# Patient Record
Sex: FEMALE | Race: WHITE | NOT HISPANIC OR LATINO | URBAN - METROPOLITAN AREA
[De-identification: names, ages, dates, MRNs, and addresses within clinical notes are randomized per-mention and may not be internally consistent; named-entity substitution may affect disease eponyms.]

---

## 2023-08-12 ENCOUNTER — HOSPITAL ENCOUNTER (OUTPATIENT)
Dept: DATA CONVERSION | Facility: HOSPITAL | Age: 28
Discharge: PSYCHIATRIC OR SUBSTANCE USE FACILITY | End: 2023-08-13

## 2023-08-12 DIAGNOSIS — F14.10 COCAINE ABUSE, UNCOMPLICATED (MULTI): ICD-10-CM

## 2023-08-12 DIAGNOSIS — Z20.822 CONTACT WITH AND (SUSPECTED) EXPOSURE TO COVID-19: ICD-10-CM

## 2023-08-12 DIAGNOSIS — F10.129 ALCOHOL ABUSE WITH INTOXICATION, UNSPECIFIED (CMS-HCC): ICD-10-CM
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
## 2023-08-12 DIAGNOSIS — Z88.0 ALLERGY STATUS TO PENICILLIN: ICD-10-CM

## 2023-08-12 DIAGNOSIS — R00.0 TACHYCARDIA, UNSPECIFIED: ICD-10-CM

## 2023-08-12 LAB
ALBUMIN SERPL-MCNC: 4.5 GM/DL (ref 3.5–5)
ALBUMIN/GLOB SERPL: 1.7 RATIO (ref 1.5–3)
ALP BLD-CCNC: 74 U/L (ref 35–125)
ALT SERPL-CCNC: 51 U/L (ref 5–40)
AMPHETAMINES UR QL SCN>1000 NG/ML: NEGATIVE
ANION GAP SERPL CALCULATED.3IONS-SCNC: 18 MMOL/L (ref 0–19)
AST SERPL-CCNC: 54 U/L (ref 5–40)
BACTERIA UR QL AUTO: NEGATIVE
BARBITURATES UR QL SCN>300 NG/ML: NEGATIVE
BASOPHILS # BLD AUTO: 0.03 K/UL (ref 0–0.22)
BASOPHILS NFR BLD AUTO: 0.4 % (ref 0–1)
BENZODIAZ UR QL SCN>300 NG/ML: NEGATIVE
BILIRUB SERPL-MCNC: 0.8 MG/DL (ref 0.1–1.2)
BILIRUB UR QL STRIP.AUTO: NEGATIVE
BUN SERPL-MCNC: 5 MG/DL (ref 8–25)
BUN/CREAT SERPL: 8.3 RATIO (ref 8–21)
BZE UR QL SCN>300 NG/ML: NORMAL
CALCIUM SERPL-MCNC: 8.9 MG/DL (ref 8.5–10.4)
CANNABINOIDS UR QL SCN>50 NG/ML: NEGATIVE
CHLORIDE SERPL-SCNC: 99 MMOL/L (ref 97–107)
CLARITY UR: CLEAR
CO2 SERPL-SCNC: 19 MMOL/L (ref 24–31)
COLOR UR: NORMAL
CREAT SERPL-MCNC: 0.6 MG/DL (ref 0.4–1.6)
DEPRECATED RDW RBC AUTO: 38 FL (ref 37–54)
DIFFERENTIAL METHOD BLD: ABNORMAL
DRUG SCREEN COMMENT UR-IMP: NORMAL
EOSINOPHIL # BLD AUTO: 0.01 K/UL (ref 0–0.45)
EOSINOPHIL NFR BLD: 0.1 % (ref 0–3)
ERYTHROCYTE [DISTWIDTH] IN BLOOD BY AUTOMATED COUNT: 11.8 % (ref 11.7–15)
ETHANOL SERPL-MCNC: 0.21 GM/DL (ref 0–0.01)
EUA DISCLAIMER: NORMAL
FENTANYL+NORFENTANYL UR QL SCN: NORMAL
FLUAV RNA NPH QL NAA+PROBE: NEGATIVE
FLUBV RNA NPH QL NAA+PROBE: NEGATIVE
GFR SERPL CREATININE-BSD FRML MDRD: 125 ML/MIN/1.73 M2
GLOBULIN SER-MCNC: 2.7 G/DL (ref 1.9–3.7)
GLUCOSE SERPL-MCNC: 98 MG/DL (ref 65–99)
GLUCOSE UR STRIP.AUTO-MCNC: NEGATIVE MG/DL
HCT VFR BLD AUTO: 47.3 % (ref 36–44)
HGB BLD-MCNC: 17.3 GM/DL (ref 12–15)
HGB UR QL STRIP.AUTO: 1 /HPF (ref 0–3)
HGB UR QL: NEGATIVE
HS TROPONIN T DELTA: NORMAL (ref 0–4)
HYALINE CASTS UR QL AUTO: NORMAL /LPF
IMM GRANULOCYTES # BLD AUTO: 0.02 K/UL (ref 0–0.1)
KETONES UR QL STRIP.AUTO: NEGATIVE
LEUKOCYTE ESTERASE UR QL STRIP.AUTO: NEGATIVE
LYMPHOCYTES # BLD AUTO: 3 K/UL (ref 1.2–3.2)
LYMPHOCYTES NFR BLD MANUAL: 41.8 % (ref 20–40)
MCH RBC QN AUTO: 32.6 PG (ref 26–34)
MCHC RBC AUTO-ENTMCNC: 36.6 % (ref 31–37)
MCV RBC AUTO: 89.2 FL (ref 80–100)
METHADONE UR QL SCN>300 NG/ML: NEGATIVE
MICROSCOPIC (UA): NORMAL
MONOCYTES # BLD AUTO: 0.59 K/UL (ref 0–0.8)
MONOCYTES NFR BLD MANUAL: 8.2 % (ref 0–8)
NEUTROPHILS # BLD AUTO: 3.53 K/UL
NEUTROPHILS # BLD AUTO: 3.53 K/UL (ref 1.8–7.7)
NEUTROPHILS.IMMATURE NFR BLD: 0.3 % (ref 0–1)
NEUTS SEG NFR BLD: 49.2 % (ref 50–70)
NITRITE UR QL STRIP.AUTO: NEGATIVE
NRBC BLD-RTO: 0 /100 WBC
NT-PROBNP SERPL-MCNC: 86 PG/ML (ref 0–178)
OPIATES UR QL SCN>300 NG/ML: NEGATIVE
OXYCODONE UR QL: NEGATIVE
PCP UR QL SCN>25 NG/ML: NEGATIVE
PH UR STRIP.AUTO: 7 [PH] (ref 4.6–8)
PLATELET # BLD AUTO: 193 K/UL (ref 150–450)
PMV BLD AUTO: 9.3 CU (ref 7–12.6)
POTASSIUM SERPL-SCNC: 3.3 MMOL/L (ref 3.4–5.1)
PROT SERPL-MCNC: 7.2 G/DL (ref 5.9–7.9)
PROT UR STRIP.AUTO-MCNC: NEGATIVE MG/DL
RBC # BLD AUTO: 5.3 M/UL (ref 4–4.9)
SARS-COV-2 RNA SPEC QL NAA+PROBE: NEGATIVE
SODIUM SERPL-SCNC: 136 MMOL/L (ref 133–145)
SP GR UR STRIP.AUTO: 1 (ref 1–1.03)
SQUAMOUS UR QL AUTO: NORMAL /HPF
TROPONIN T SERPL-MCNC: 6 NG/L
URINE CULTURE: NORMAL
UROBILINOGEN UR QL STRIP.AUTO: NORMAL MG/DL (ref 0–1)
WBC # BLD AUTO: 7.2 K/UL (ref 4.5–11)
WBC #/AREA URNS AUTO: 1 /HPF (ref 0–3)

## 2023-10-12 DIAGNOSIS — Z30.41 SURVEILLANCE FOR BIRTH CONTROL, ORAL CONTRACEPTIVES: Primary | ICD-10-CM

## 2023-10-13 RX ORDER — NORETHINDRONE 0.35 MG/1
1 TABLET ORAL DAILY
Qty: 28 TABLET | Refills: 1 | Status: SHIPPED | OUTPATIENT
Start: 2023-10-13 | End: 2023-12-06

## 2024-02-07 ENCOUNTER — OFFICE VISIT (OUTPATIENT)
Dept: OBSTETRICS AND GYNECOLOGY | Facility: CLINIC | Age: 29
End: 2024-02-07
Payer: COMMERCIAL

## 2024-02-07 VITALS
WEIGHT: 193.8 LBS | DIASTOLIC BLOOD PRESSURE: 78 MMHG | HEIGHT: 67 IN | BODY MASS INDEX: 30.42 KG/M2 | SYSTOLIC BLOOD PRESSURE: 122 MMHG

## 2024-02-07 DIAGNOSIS — Z12.4 CERVICAL CANCER SCREENING: ICD-10-CM

## 2024-02-07 DIAGNOSIS — Z01.419 WELL WOMAN EXAM: Primary | ICD-10-CM

## 2024-02-07 DIAGNOSIS — Z11.51 ENCOUNTER FOR SCREENING FOR HUMAN PAPILLOMAVIRUS (HPV): ICD-10-CM

## 2024-02-07 DIAGNOSIS — Z98.890 HISTORY OF LOOP ELECTROSURGICAL EXCISION PROCEDURE (LEEP) OF CERVIX: ICD-10-CM

## 2024-02-07 DIAGNOSIS — Z31.9 PATIENT DESIRES PREGNANCY: ICD-10-CM

## 2024-02-07 PROCEDURE — 99395 PREV VISIT EST AGE 18-39: CPT | Performed by: OBSTETRICS & GYNECOLOGY

## 2024-02-07 PROCEDURE — 87661 TRICHOMONAS VAGINALIS AMPLIF: CPT | Performed by: OBSTETRICS & GYNECOLOGY

## 2024-02-07 PROCEDURE — 88175 CYTOPATH C/V AUTO FLUID REDO: CPT | Mod: TC,GCY,WESLAB | Performed by: OBSTETRICS & GYNECOLOGY

## 2024-02-07 PROCEDURE — 87624 HPV HI-RISK TYP POOLED RSLT: CPT | Performed by: OBSTETRICS & GYNECOLOGY

## 2024-02-07 PROCEDURE — 87800 DETECT AGNT MULT DNA DIREC: CPT | Mod: 59 | Performed by: OBSTETRICS & GYNECOLOGY

## 2024-02-07 ASSESSMENT — LIFESTYLE VARIABLES
HOW OFTEN DO YOU HAVE A DRINK CONTAINING ALCOHOL: NEVER
HOW MANY STANDARD DRINKS CONTAINING ALCOHOL DO YOU HAVE ON A TYPICAL DAY: PATIENT DOES NOT DRINK
AUDIT-C TOTAL SCORE: 0
HOW OFTEN DO YOU HAVE SIX OR MORE DRINKS ON ONE OCCASION: NEVER
SKIP TO QUESTIONS 9-10: 1

## 2024-02-07 ASSESSMENT — PAIN SCALES - GENERAL: PAINLEVEL: 0-NO PAIN

## 2024-02-07 ASSESSMENT — PATIENT HEALTH QUESTIONNAIRE - PHQ9
SUM OF ALL RESPONSES TO PHQ9 QUESTIONS 1 & 2: 0
1. LITTLE INTEREST OR PLEASURE IN DOING THINGS: NOT AT ALL
2. FEELING DOWN, DEPRESSED OR HOPELESS: NOT AT ALL

## 2024-02-07 ASSESSMENT — ENCOUNTER SYMPTOMS
OCCASIONAL FEELINGS OF UNSTEADINESS: 0
DEPRESSION: 0
LOSS OF SENSATION IN FEET: 0

## 2024-02-07 NOTE — PROGRESS NOTES
ESTABLISHED ANNUAL GYN VISIT     Patient Name:  Lizet Storm  :  1995  MR #:  [id number]  Acct #:  [id number]      ASSESSMENT/PLAN:   1. Well woman exam      2. History of loop electrosurgical excision procedure (LEEP) of cervix      3. Patient desires pregnancy      4. Cervical cancer screening    - THINPREP PAP TEST  - HPV DNA High Risk With Genotype  - C. Trachomatis / N. Gonorrhoeae, Amplified Detection  - Trichomonas vaginalis, Nucleic Acid Detection    5. Encounter for screening for human papillomavirus (HPV)    - THINPREP PAP TEST  - HPV DNA High Risk With Genotype  - C. Trachomatis / N. Gonorrhoeae, Amplified Detection  - Trichomonas vaginalis, Nucleic Acid Detection     .All questions answered.  Breast self exam technique reviewed and patient encouraged to perform self-exam monthly.  Discussed healthy lifestyle modifications.  Follow up in 1 year.  Thin prep Pap smear.      Advised to resume previously prescribed folic acid. RTO if no pregnancy after trying for 1 year.    Counseling:  Medication education:  Education:  All new and/or current medications discussed and reviewed including side effects with patient/caregiver, Understanding:  Caregiver/Patient expressed understanding., Adherence:  Barriers to adherence identified and discussed if present,     OB/GYN Preventive:  Pap smear indicated every 5 years if normal and otherwise low risk. Self breast exam monthly and clinical breast examination yearly discussed.  Diet/Weight management discussed.  Screening colonoscopy recommended starting age 45, then Q3-10 years depending on testing and family history.  Osteoporosis prevention discussion included vit d3/calcium supplements, weight-bearing exercise.  Genitourinary skin hygiene discussed.      Chief Complaint:  Annual exam    HPI:  Lizet Storm is a 28 y.o.  Patient's last menstrual period was 2024. C. female who presents for annual. Hx of LEEP. Last 2 paps normal.   New sex  "partner, desires STD screen.  Trying for pregnancy x 4 months      [facility]:   MENARCHE:  HPV Vaccine Yes 2 shots.  Sexual activity Yes - . [place] Yes -  Birth control history: OCP    History reviewed. No pertinent past medical history.    Past Surgical History:   Procedure Laterality Date    CERVICAL BIOPSY  W/ LOOP ELECTRODE EXCISION      COSMETIC SURGERY      BBL & lipo       Social History     Tobacco Use    Smoking status: Every Day     Packs/day: .5     Types: Cigarettes    Smokeless tobacco: Never   Vaping Use    Vaping Use: Every day    Substances: Nicotine   Substance Use Topics    Alcohol use: Not Currently    Drug use: Yes     Types: Marijuana        No family history on file.    OB History          0    Para   0    Term   0       0    AB   0    Living   0         SAB   0    IAB   0    Ectopic   0    Multiple   0    Live Births   0                  Prior to Admission medications    Medication Sig Start Date End Date Taking? Authorizing Provider   norethindrone (Micronor) 0.35 mg tablet TAKE 1 TABLET BY MOUTH DAILY 23   Sofia Belle PA-C       Allergies   Allergen Reactions    Penicillin V Unknown         ROS:   [facility] - WOMEN ONLY:          Breast Lump No acute changes.  Hot Flashes Occasional.  Painful Forsan no.  Vaginal Discharge no.       [facility] - ROS Update:          Unexplained Weight Change no.  Pain anywhere in your body no.  Black or bloody stools no.  Problems with urination no.  Rashes or sores no.  Sexual problems no.  Depression or anxiety problems no.  Do you feel threatened by anyone No.      OBJECTIVE:   /78   Ht 1.702 m (5' 7\")   Wt 87.9 kg (193 lb 12.8 oz)   LMP 2024   BMI 30.35 kg/m²   Body mass index is 30.35 kg/m².     Physical Exam  GENERAL:   General Appearance:  well-developed, well-nourished, no functional handicap, well-groomed.  Hygiene:  good.  Ill-appearance:  none.  Mental Status:  alert and oriented.  Speech:  clear.  Eye contact:  " normal.  Appears stated age:  yes.  Labs reviewed during visit  yes.  Diagnostic imaging reviewed with patient  yes.   LUNGS:  CTAB.  Effort:  no respiratory distress.    HEART:  HRRR with S1/S2 w/o M/C/R  BREASTS: General:  no masses, no tenderness, no skin changes, no nipple abnormality, and no axillary lymphadenopathy.   ABDOMEN: Tenderness:  none.  Distention:  none.   GENITOURINARY - FEMALE: Bladder:  normal.  Pelvic support defects:  not seen .  External genitalia:  Normal.  Urethra:  Normal.  Vagina:  no lesions, moderate discharge; STI screens collected - No.  Cervix/ cuff:  normal appearance .  Uterus:  normal size/shape/consistency, non tender.  Adnexa:  normal , non tender.   DERMATOLOGY:  Skin:  No acute changes  EXTREMITIES: Normal:  no anomalies.  Edema:  none.    NEUROLOGICAL: Orientation:  alert and oriented x 3.   PSYCHOLOGY: Affect:  appropriate.  Mood:  pleasant.    Labs reviewed:  Yes -     Imaging reviewed:  Yes -     Note: This dictation was generated using Dragon voice recognition software. Please excuse any grammatical or spelling errors that may have occurred using the system.

## 2024-02-08 LAB
C TRACH RRNA SPEC QL NAA+PROBE: NEGATIVE
N GONORRHOEA DNA SPEC QL PROBE+SIG AMP: NEGATIVE
T VAGINALIS RRNA SPEC QL NAA+PROBE: NEGATIVE

## 2024-02-23 LAB
CYTOLOGY CMNT CVX/VAG CYTO-IMP: NORMAL
HPV HR 12 DNA GENITAL QL NAA+PROBE: NEGATIVE
HPV HR GENOTYPES PNL CVX NAA+PROBE: NEGATIVE
HPV16 DNA SPEC QL NAA+PROBE: NEGATIVE
HPV18 DNA SPEC QL NAA+PROBE: NEGATIVE
LAB AP CONTRACEPTIVE HISTORY: NORMAL
LAB AP HPV GENOTYPE QUESTION: YES
LAB AP HPV HR: NORMAL
LAB AP PAP ADDITIONAL TESTS: NORMAL
LAB AP TREATMENT HISTORY: NORMAL
LABORATORY COMMENT REPORT: NORMAL
LMP START DATE: NORMAL
PATH REPORT.TOTAL CANCER: NORMAL

## 2024-02-27 ENCOUNTER — HOSPITAL ENCOUNTER (EMERGENCY)
Facility: HOSPITAL | Age: 29
Discharge: LEFT BEFORE DISCHARGE | End: 2024-02-27
Payer: COMMERCIAL

## 2024-02-27 VITALS
BODY MASS INDEX: 28.25 KG/M2 | DIASTOLIC BLOOD PRESSURE: 70 MMHG | RESPIRATION RATE: 16 BRPM | HEIGHT: 67 IN | WEIGHT: 180 LBS | SYSTOLIC BLOOD PRESSURE: 120 MMHG | OXYGEN SATURATION: 99 % | HEART RATE: 110 BPM | TEMPERATURE: 99.3 F

## 2024-02-27 PROCEDURE — 4500999001 HC ED NO CHARGE

## 2024-02-27 ASSESSMENT — COLUMBIA-SUICIDE SEVERITY RATING SCALE - C-SSRS
1. IN THE PAST MONTH, HAVE YOU WISHED YOU WERE DEAD OR WISHED YOU COULD GO TO SLEEP AND NOT WAKE UP?: NO
2. HAVE YOU ACTUALLY HAD ANY THOUGHTS OF KILLING YOURSELF?: NO
6. HAVE YOU EVER DONE ANYTHING, STARTED TO DO ANYTHING, OR PREPARED TO DO ANYTHING TO END YOUR LIFE?: NO

## 2024-02-27 ASSESSMENT — PAIN SCALES - GENERAL: PAINLEVEL_OUTOF10: 6

## 2024-02-27 ASSESSMENT — PAIN - FUNCTIONAL ASSESSMENT: PAIN_FUNCTIONAL_ASSESSMENT: 0-10

## 2024-03-06 ENCOUNTER — TELEPHONE (OUTPATIENT)
Dept: OBSTETRICS AND GYNECOLOGY | Facility: CLINIC | Age: 29
End: 2024-03-06
Payer: COMMERCIAL

## 2024-03-06 NOTE — TELEPHONE ENCOUNTER
Patient called stating that she was offered Rx for BV end of Feb and she declined because she didn't feel she had any Sx but now she is. She has irritation and white discharge, she is thinking she should take the Metrogel. I went ahead and called it in for her. Metrogel 0.75% insert one application at HS X5days with zero refills.

## 2024-06-26 ENCOUNTER — APPOINTMENT (OUTPATIENT)
Dept: RADIOLOGY | Facility: HOSPITAL | Age: 29
End: 2024-06-26
Payer: COMMERCIAL

## 2024-06-26 ENCOUNTER — APPOINTMENT (OUTPATIENT)
Dept: CARDIOLOGY | Facility: HOSPITAL | Age: 29
End: 2024-06-26
Payer: COMMERCIAL

## 2024-06-26 ENCOUNTER — HOSPITAL ENCOUNTER (EMERGENCY)
Facility: HOSPITAL | Age: 29
Discharge: HOME | End: 2024-06-26
Attending: STUDENT IN AN ORGANIZED HEALTH CARE EDUCATION/TRAINING PROGRAM
Payer: COMMERCIAL

## 2024-06-26 VITALS
BODY MASS INDEX: 30.53 KG/M2 | WEIGHT: 190 LBS | RESPIRATION RATE: 16 BRPM | HEIGHT: 66 IN | OXYGEN SATURATION: 99 % | HEART RATE: 94 BPM | SYSTOLIC BLOOD PRESSURE: 121 MMHG | DIASTOLIC BLOOD PRESSURE: 102 MMHG | TEMPERATURE: 97.7 F

## 2024-06-26 DIAGNOSIS — R55 NEAR SYNCOPE: Primary | ICD-10-CM

## 2024-06-26 DIAGNOSIS — E86.0 DEHYDRATION: ICD-10-CM

## 2024-06-26 LAB
ALBUMIN SERPL-MCNC: 4.4 G/DL (ref 3.5–5)
ALP BLD-CCNC: 73 U/L (ref 35–125)
ALT SERPL-CCNC: 18 U/L (ref 5–40)
AMPHETAMINES UR QL SCN>1000 NG/ML: NEGATIVE
ANION GAP SERPL CALC-SCNC: 14 MMOL/L
APPEARANCE UR: ABNORMAL
AST SERPL-CCNC: 17 U/L (ref 5–40)
BARBITURATES UR QL SCN>300 NG/ML: NEGATIVE
BASOPHILS # BLD AUTO: 0.14 X10*3/UL (ref 0–0.1)
BASOPHILS NFR BLD AUTO: 0.8 %
BENZODIAZ UR QL SCN>300 NG/ML: NEGATIVE
BILIRUB SERPL-MCNC: 0.4 MG/DL (ref 0.1–1.2)
BILIRUB UR STRIP.AUTO-MCNC: NEGATIVE MG/DL
BUN SERPL-MCNC: 9 MG/DL (ref 8–25)
BZE UR QL SCN>300 NG/ML: NEGATIVE
CALCIUM SERPL-MCNC: 9.5 MG/DL (ref 8.5–10.4)
CANNABINOIDS UR QL SCN>50 NG/ML: POSITIVE
CHLORIDE SERPL-SCNC: 104 MMOL/L (ref 97–107)
CO2 SERPL-SCNC: 20 MMOL/L (ref 24–31)
COLOR UR: YELLOW
CREAT SERPL-MCNC: 0.7 MG/DL (ref 0.4–1.6)
D DIMER PPP FEU-MCNC: 0.36 MG/L FEU (ref 0.19–0.5)
EGFRCR SERPLBLD CKD-EPI 2021: >90 ML/MIN/1.73M*2
EOSINOPHIL # BLD AUTO: 2.21 X10*3/UL (ref 0–0.7)
EOSINOPHIL NFR BLD AUTO: 12.6 %
ERYTHROCYTE [DISTWIDTH] IN BLOOD BY AUTOMATED COUNT: 12.3 % (ref 11.5–14.5)
FENTANYL+NORFENTANYL UR QL SCN: NEGATIVE
GLUCOSE BLD MANUAL STRIP-MCNC: 96 MG/DL (ref 74–99)
GLUCOSE SERPL-MCNC: 89 MG/DL (ref 65–99)
GLUCOSE UR STRIP.AUTO-MCNC: NORMAL MG/DL
HCG SERPL-ACNC: <1 MIU/ML
HCT VFR BLD AUTO: 43.4 % (ref 36–46)
HGB BLD-MCNC: 14.8 G/DL (ref 12–16)
IMM GRANULOCYTES # BLD AUTO: 0.06 X10*3/UL (ref 0–0.7)
IMM GRANULOCYTES NFR BLD AUTO: 0.3 % (ref 0–0.9)
KETONES UR STRIP.AUTO-MCNC: ABNORMAL MG/DL
LEUKOCYTE ESTERASE UR QL STRIP.AUTO: NEGATIVE
LYMPHOCYTES # BLD AUTO: 5.21 X10*3/UL (ref 1.2–4.8)
LYMPHOCYTES NFR BLD AUTO: 29.8 %
MAGNESIUM SERPL-MCNC: 2.3 MG/DL (ref 1.6–3.1)
MCH RBC QN AUTO: 30.1 PG (ref 26–34)
MCHC RBC AUTO-ENTMCNC: 34.1 G/DL (ref 32–36)
MCV RBC AUTO: 88 FL (ref 80–100)
METHADONE UR QL SCN>300 NG/ML: NEGATIVE
MONOCYTES # BLD AUTO: 1.44 X10*3/UL (ref 0.1–1)
MONOCYTES NFR BLD AUTO: 8.2 %
MUCOUS THREADS #/AREA URNS AUTO: NORMAL /LPF
NEUTROPHILS # BLD AUTO: 8.45 X10*3/UL (ref 1.2–7.7)
NEUTROPHILS NFR BLD AUTO: 48.3 %
NITRITE UR QL STRIP.AUTO: NEGATIVE
NRBC BLD-RTO: 0 /100 WBCS (ref 0–0)
OPIATES UR QL SCN>300 NG/ML: NEGATIVE
OXYCODONE UR QL: NEGATIVE
PCP UR QL SCN>25 NG/ML: NEGATIVE
PH UR STRIP.AUTO: 6 [PH]
PLATELET # BLD AUTO: 367 X10*3/UL (ref 150–450)
POTASSIUM SERPL-SCNC: 3.5 MMOL/L (ref 3.4–5.1)
PROT SERPL-MCNC: 7.3 G/DL (ref 5.9–7.9)
PROT UR STRIP.AUTO-MCNC: ABNORMAL MG/DL
RBC # BLD AUTO: 4.92 X10*6/UL (ref 4–5.2)
RBC # UR STRIP.AUTO: NEGATIVE /UL
RBC #/AREA URNS AUTO: NORMAL /HPF
SODIUM SERPL-SCNC: 138 MMOL/L (ref 133–145)
SP GR UR STRIP.AUTO: 1.02
SQUAMOUS #/AREA URNS AUTO: NORMAL /HPF
TROPONIN T SERPL-MCNC: <6 NG/L
TROPONIN T SERPL-MCNC: <6 NG/L
UROBILINOGEN UR STRIP.AUTO-MCNC: NORMAL MG/DL
WBC # BLD AUTO: 17.5 X10*3/UL (ref 4.4–11.3)
WBC #/AREA URNS AUTO: NORMAL /HPF

## 2024-06-26 PROCEDURE — 99283 EMERGENCY DEPT VISIT LOW MDM: CPT | Mod: 25

## 2024-06-26 PROCEDURE — 84702 CHORIONIC GONADOTROPIN TEST: CPT | Performed by: STUDENT IN AN ORGANIZED HEALTH CARE EDUCATION/TRAINING PROGRAM

## 2024-06-26 PROCEDURE — 84484 ASSAY OF TROPONIN QUANT: CPT | Performed by: STUDENT IN AN ORGANIZED HEALTH CARE EDUCATION/TRAINING PROGRAM

## 2024-06-26 PROCEDURE — 82947 ASSAY GLUCOSE BLOOD QUANT: CPT

## 2024-06-26 PROCEDURE — 71045 X-RAY EXAM CHEST 1 VIEW: CPT

## 2024-06-26 PROCEDURE — 80307 DRUG TEST PRSMV CHEM ANLYZR: CPT | Performed by: STUDENT IN AN ORGANIZED HEALTH CARE EDUCATION/TRAINING PROGRAM

## 2024-06-26 PROCEDURE — 81001 URINALYSIS AUTO W/SCOPE: CPT | Performed by: STUDENT IN AN ORGANIZED HEALTH CARE EDUCATION/TRAINING PROGRAM

## 2024-06-26 PROCEDURE — 71045 X-RAY EXAM CHEST 1 VIEW: CPT | Performed by: RADIOLOGY

## 2024-06-26 PROCEDURE — 85300 ANTITHROMBIN III ACTIVITY: CPT | Performed by: STUDENT IN AN ORGANIZED HEALTH CARE EDUCATION/TRAINING PROGRAM

## 2024-06-26 PROCEDURE — 2500000004 HC RX 250 GENERAL PHARMACY W/ HCPCS (ALT 636 FOR OP/ED): Performed by: STUDENT IN AN ORGANIZED HEALTH CARE EDUCATION/TRAINING PROGRAM

## 2024-06-26 PROCEDURE — 93005 ELECTROCARDIOGRAM TRACING: CPT

## 2024-06-26 PROCEDURE — 85025 COMPLETE CBC W/AUTO DIFF WBC: CPT | Performed by: STUDENT IN AN ORGANIZED HEALTH CARE EDUCATION/TRAINING PROGRAM

## 2024-06-26 PROCEDURE — 83735 ASSAY OF MAGNESIUM: CPT | Performed by: STUDENT IN AN ORGANIZED HEALTH CARE EDUCATION/TRAINING PROGRAM

## 2024-06-26 PROCEDURE — 80053 COMPREHEN METABOLIC PANEL: CPT | Performed by: STUDENT IN AN ORGANIZED HEALTH CARE EDUCATION/TRAINING PROGRAM

## 2024-06-26 PROCEDURE — 36415 COLL VENOUS BLD VENIPUNCTURE: CPT | Performed by: STUDENT IN AN ORGANIZED HEALTH CARE EDUCATION/TRAINING PROGRAM

## 2024-06-26 ASSESSMENT — COLUMBIA-SUICIDE SEVERITY RATING SCALE - C-SSRS
6. HAVE YOU EVER DONE ANYTHING, STARTED TO DO ANYTHING, OR PREPARED TO DO ANYTHING TO END YOUR LIFE?: NO
1. IN THE PAST MONTH, HAVE YOU WISHED YOU WERE DEAD OR WISHED YOU COULD GO TO SLEEP AND NOT WAKE UP?: NO
2. HAVE YOU ACTUALLY HAD ANY THOUGHTS OF KILLING YOURSELF?: NO

## 2024-06-26 ASSESSMENT — PAIN - FUNCTIONAL ASSESSMENT: PAIN_FUNCTIONAL_ASSESSMENT: 0-10

## 2024-06-26 ASSESSMENT — PAIN DESCRIPTION - PROGRESSION: CLINICAL_PROGRESSION: NOT CHANGED

## 2024-06-26 ASSESSMENT — PAIN SCALES - GENERAL: PAINLEVEL_OUTOF10: 0 - NO PAIN

## 2024-06-26 NOTE — ED TRIAGE NOTES
Patient presents diaphoretic and pale. She states prior to arrival she got dizzy and light headed.

## 2024-06-26 NOTE — ED PROVIDER NOTES
HPI   No chief complaint on file.      Patient is a 28-year-old female that presents emergency room for evaluation of dizziness, diaphoresis, lightheadedness and near syncope.  Patient states that she was out with her significant other getting a smoothie when she suddenly felt very lightheaded and flushed.  She went out to her vehicle and became very sweaty, lightheaded and thought she was going to pass out.  She admits to feeling short of breath as well as having a tight chest.  She denies abdominal pain, nausea, vomiting, fevers.  Patient states that the lightheadedness is slightly improving at this time however she still remains with chills as well as generalized weakness.      History provided by:  Patient                      No data recorded                   Patient History   No past medical history on file.  Past Surgical History:   Procedure Laterality Date    CERVICAL BIOPSY  W/ LOOP ELECTRODE EXCISION      COSMETIC SURGERY  2022    BBL & lipo     No family history on file.  Social History     Tobacco Use    Smoking status: Every Day     Current packs/day: 0.50     Types: Cigarettes    Smokeless tobacco: Never   Vaping Use    Vaping status: Every Day    Substances: Nicotine   Substance Use Topics    Alcohol use: Not Currently    Drug use: Yes     Types: Marijuana       Physical Exam   ED Triage Vitals   Temp Pulse Resp BP   -- -- -- --      SpO2 Temp src Heart Rate Source Patient Position   -- -- -- --      BP Location FiO2 (%)     -- --       Physical Exam  Vitals and nursing note reviewed.   Constitutional:       General: She is not in acute distress.     Appearance: She is ill-appearing and diaphoretic.   HENT:      Head: Normocephalic and atraumatic.      Mouth/Throat:      Mouth: Mucous membranes are moist.   Eyes:      Extraocular Movements: Extraocular movements intact.      Pupils: Pupils are equal, round, and reactive to light.   Cardiovascular:      Rate and Rhythm: Regular rhythm. Tachycardia  present.   Pulmonary:      Effort: Pulmonary effort is normal. No respiratory distress.      Breath sounds: No stridor. No wheezing or rhonchi.   Abdominal:      General: Abdomen is flat.      Tenderness: There is no abdominal tenderness. There is no guarding or rebound.   Musculoskeletal:         General: No swelling or tenderness.      Cervical back: Normal range of motion and neck supple. No rigidity or tenderness.   Skin:     General: Skin is warm.      Coloration: Skin is pale.   Neurological:      General: No focal deficit present.      Mental Status: She is alert and oriented to person, place, and time.       Recent Results (from the past 24 hour(s))   POCT GLUCOSE    Collection Time: 06/26/24  6:48 PM   Result Value Ref Range    POCT Glucose 96 74 - 99 mg/dL   CBC and Auto Differential    Collection Time: 06/26/24  7:03 PM   Result Value Ref Range    WBC 17.5 (H) 4.4 - 11.3 x10*3/uL    nRBC 0.0 0.0 - 0.0 /100 WBCs    RBC 4.92 4.00 - 5.20 x10*6/uL    Hemoglobin 14.8 12.0 - 16.0 g/dL    Hematocrit 43.4 36.0 - 46.0 %    MCV 88 80 - 100 fL    MCH 30.1 26.0 - 34.0 pg    MCHC 34.1 32.0 - 36.0 g/dL    RDW 12.3 11.5 - 14.5 %    Platelets 367 150 - 450 x10*3/uL    Neutrophils % 48.3 40.0 - 80.0 %    Immature Granulocytes %, Automated 0.3 0.0 - 0.9 %    Lymphocytes % 29.8 13.0 - 44.0 %    Monocytes % 8.2 2.0 - 10.0 %    Eosinophils % 12.6 0.0 - 6.0 %    Basophils % 0.8 0.0 - 2.0 %    Neutrophils Absolute 8.45 (H) 1.20 - 7.70 x10*3/uL    Immature Granulocytes Absolute, Automated 0.06 0.00 - 0.70 x10*3/uL    Lymphocytes Absolute 5.21 (H) 1.20 - 4.80 x10*3/uL    Monocytes Absolute 1.44 (H) 0.10 - 1.00 x10*3/uL    Eosinophils Absolute 2.21 (H) 0.00 - 0.70 x10*3/uL    Basophils Absolute 0.14 (H) 0.00 - 0.10 x10*3/uL   Comprehensive Metabolic Panel    Collection Time: 06/26/24  7:03 PM   Result Value Ref Range    Glucose 89 65 - 99 mg/dL    Sodium 138 133 - 145 mmol/L    Potassium 3.5 3.4 - 5.1 mmol/L    Chloride 104 97 -  107 mmol/L    Bicarbonate 20 (L) 24 - 31 mmol/L    Urea Nitrogen 9 8 - 25 mg/dL    Creatinine 0.70 0.40 - 1.60 mg/dL    eGFR >90 >60 mL/min/1.73m*2    Calcium 9.5 8.5 - 10.4 mg/dL    Albumin 4.4 3.5 - 5.0 g/dL    Alkaline Phosphatase 73 35 - 125 U/L    Total Protein 7.3 5.9 - 7.9 g/dL    AST 17 5 - 40 U/L    Bilirubin, Total 0.4 0.1 - 1.2 mg/dL    ALT 18 5 - 40 U/L    Anion Gap 14 <=19 mmol/L   D-dimer, quantitative    Collection Time: 06/26/24  7:03 PM   Result Value Ref Range    D-Dimer Non VTE, Quant (mg/L FEU) 0.36 0.19 - 0.50 mg/L FEU   Human Chorionic Gonadotropin, Serum Quantitative    Collection Time: 06/26/24  7:03 PM   Result Value Ref Range    HCG, Beta-Quantitative <1 SEE COMMENT BELOW mIU/mL   Serial Troponin, Initial ([facility])    Collection Time: 06/26/24  7:03 PM   Result Value Ref Range    Troponin T, High Sensitivity <6 <=14 ng/L   Magnesium    Collection Time: 06/26/24  7:03 PM   Result Value Ref Range    Magnesium 2.30 1.60 - 3.10 mg/dL   Drug Screen, Urine    Collection Time: 06/26/24  8:36 PM   Result Value Ref Range    Amphetamine Screen, Urine Negative      Barbiturate Screen, Urine Negative      Benzodiazepines Screen, Urine Negative      Cannabinoid Screen, Urine Positive (A)      Cocaine Metabolite Screen, Urine Negative      Fentanyl Screen, Urine Negative       Methadone Screen, Urine Negative      Opiate Screen, Urine Negative      Oxycodone Screen, Urine Negative      PCP Screen, Urine Negative     Urinalysis with Reflex Culture and Microscopic    Collection Time: 06/26/24  8:36 PM   Result Value Ref Range    Color, Urine Yellow Light-Yellow, Yellow, Dark-Yellow    Appearance, Urine Turbid (N) Clear    Specific Gravity, Urine 1.020 1.005 - 1.035    pH, Urine 6.0 5.0, 5.5, 6.0, 6.5, 7.0, 7.5, 8.0    Protein, Urine 10 (TRACE) NEGATIVE, 10 (TRACE), 20 (TRACE) mg/dL    Glucose, Urine Normal Normal mg/dL    Blood, Urine NEGATIVE NEGATIVE    Ketones, Urine TRACE (A) NEGATIVE mg/dL    Bilirubin,  Urine NEGATIVE NEGATIVE    Urobilinogen, Urine Normal Normal mg/dL    Nitrite, Urine NEGATIVE NEGATIVE    Leukocyte Esterase, Urine NEGATIVE NEGATIVE   Urinalysis Microscopic    Collection Time: 06/26/24  8:36 PM   Result Value Ref Range    WBC, Urine 1-5 1-5, NONE /HPF    RBC, Urine 1-2 NONE, 1-2, 3-5 /HPF    Squamous Epithelial Cells, Urine 10-25 (FEW) Reference range not established. /HPF    Mucus, Urine 4+ Reference range not established. /LPF   Serial Troponin, 2 Hour ([facility])    Collection Time: 06/26/24  9:22 PM   Result Value Ref Range    Troponin T, High Sensitivity <6 <=14 ng/L         ED Course & MDM   Diagnoses as of 06/26/24 2159   Near syncope   Dehydration       Medical Decision Making  Patient is a 28-year-old female who presents emergency room for evaluation after a near syncopal episode.  Patient ill-appearing, pale and diaphoretic on initial presentation.  EKG shows normal sinus rhythm with rate of 95 bpm, normal axis, QTc 467, no evidence of STEMI.  There is no evidence of Brugada syndrome, hypertrophic cardiomyopathy or prolonged QT syndrome.  Blood work ordered including CBC, CMP, troponin, serum pregnancy test, D-dimer.  Chest x-ray also ordered which showed no evidence pneumonia, pneumothorax, wide mediastinum.  Blood work was unremarkable including negative D-dimer of 0.36 and a very low suspicion for pulmonary embolism.  Troponin was less than 6 on initial testing and remained flat on repeat testing at less than 6.  She does have normal electrolytes, normal kidney function.  She did test positive for cannabinoids but urine drug screen was otherwise negative.  Patient not pregnant at this time.  She was initially borderline hypotensive with a blood pressure of 96/49 however was given 2 L of IV fluid with significant improvement of symptoms and states she is feeling much better.  She is able to ambulate without difficulty throughout stay in the emergency department.  Blood pressure did  significantly improve with IV fluids and it is felt this is likely vasovagal versus possible dehydration.  Patient does note that when she was younger she would have similar symptoms in never had a exact diagnosis for the previous near syncopal episodes.  Given these previous episodes with patient's current near syncopal episode she will be given cardiologist to follow-up with.  Patient also given primary care physician to follow-up with at this time.  Return precautions were discussed with patient and she is agreeable to plan.        Procedure  Procedures     Burt Wilkerson,   06/27/24 0022

## 2024-06-27 LAB
ATRIAL RATE: 95 BPM
HOLD SPECIMEN: NORMAL
P AXIS: 36 DEGREES
P OFFSET: 202 MS
P ONSET: 154 MS
PR INTERVAL: 128 MS
Q ONSET: 218 MS
QRS COUNT: 15 BEATS
QRS DURATION: 94 MS
QT INTERVAL: 372 MS
QTC CALCULATION(BAZETT): 467 MS
QTC FREDERICIA: 433 MS
R AXIS: 71 DEGREES
T AXIS: 59 DEGREES
T OFFSET: 404 MS
VENTRICULAR RATE: 95 BPM

## 2024-06-27 NOTE — DISCHARGE INSTRUCTIONS
Follow-up with cardiologist as well as a primary care physician for further evaluation moving forward.  If symptoms worsen or change he can return at any time for further evaluation and treatment.

## 2024-06-28 ENCOUNTER — APPOINTMENT (OUTPATIENT)
Dept: PRIMARY CARE | Facility: CLINIC | Age: 29
End: 2024-06-28
Payer: COMMERCIAL

## 2024-07-03 ENCOUNTER — APPOINTMENT (OUTPATIENT)
Dept: CARDIOLOGY | Facility: CLINIC | Age: 29
End: 2024-07-03
Payer: COMMERCIAL

## 2024-07-15 ENCOUNTER — OFFICE VISIT (OUTPATIENT)
Dept: PRIMARY CARE | Facility: CLINIC | Age: 29
End: 2024-07-15
Payer: COMMERCIAL

## 2024-07-15 VITALS
WEIGHT: 182 LBS | HEART RATE: 98 BPM | BODY MASS INDEX: 29.25 KG/M2 | TEMPERATURE: 96 F | SYSTOLIC BLOOD PRESSURE: 110 MMHG | OXYGEN SATURATION: 95 % | DIASTOLIC BLOOD PRESSURE: 80 MMHG | HEIGHT: 66 IN

## 2024-07-15 DIAGNOSIS — Z00.00 WELL ADULT EXAM: Primary | ICD-10-CM

## 2024-07-15 DIAGNOSIS — E86.0 DEHYDRATION: ICD-10-CM

## 2024-07-15 DIAGNOSIS — R06.2 WHEEZING: ICD-10-CM

## 2024-07-15 DIAGNOSIS — K21.00 GASTROESOPHAGEAL REFLUX DISEASE WITH ESOPHAGITIS, UNSPECIFIED WHETHER HEMORRHAGE: ICD-10-CM

## 2024-07-15 PROBLEM — F11.91 OPIOID USE DISORDER IN REMISSION: Status: ACTIVE | Noted: 2021-06-08

## 2024-07-15 PROBLEM — R44.0 AUDITORY HALLUCINATIONS: Status: RESOLVED | Noted: 2024-07-15 | Resolved: 2024-07-15

## 2024-07-15 PROBLEM — F14.20: Chronic | Status: RESOLVED | Noted: 2023-08-02 | Resolved: 2024-07-15

## 2024-07-15 PROBLEM — F33.1 MODERATE EPISODE OF RECURRENT MAJOR DEPRESSIVE DISORDER (MULTI): Chronic | Status: ACTIVE | Noted: 2023-08-02

## 2024-07-15 PROBLEM — R44.0 AUDITORY HALLUCINATIONS: Status: ACTIVE | Noted: 2024-07-15

## 2024-07-15 PROBLEM — F14.20: Chronic | Status: ACTIVE | Noted: 2023-08-02

## 2024-07-15 PROBLEM — F10.10 ALCOHOL ABUSE: Status: RESOLVED | Noted: 2022-09-24 | Resolved: 2024-07-15

## 2024-07-15 PROBLEM — F19.10 POLYSUBSTANCE ABUSE (MULTI): Status: ACTIVE | Noted: 2024-07-15

## 2024-07-15 PROBLEM — F19.10 POLYSUBSTANCE ABUSE (MULTI): Status: RESOLVED | Noted: 2024-07-15 | Resolved: 2024-07-15

## 2024-07-15 PROBLEM — F10.10 ALCOHOL ABUSE: Status: ACTIVE | Noted: 2022-09-24

## 2024-07-15 PROCEDURE — 99385 PREV VISIT NEW AGE 18-39: CPT | Performed by: NURSE PRACTITIONER

## 2024-07-15 RX ORDER — OMEPRAZOLE 40 MG/1
40 CAPSULE, DELAYED RELEASE ORAL
Qty: 90 CAPSULE | Refills: 1 | Status: SHIPPED | OUTPATIENT
Start: 2024-07-15 | End: 2025-01-11

## 2024-07-15 RX ORDER — ALBUTEROL SULFATE 90 UG/1
2 AEROSOL, METERED RESPIRATORY (INHALATION) EVERY 4 HOURS PRN
Qty: 8 G | Refills: 5 | Status: SHIPPED | OUTPATIENT
Start: 2024-07-15 | End: 2025-07-15

## 2024-07-15 RX ORDER — DEXTROAMPHETAMINE SACCHARATE, AMPHETAMINE ASPARTATE MONOHYDRATE, DEXTROAMPHETAMINE SULFATE AND AMPHETAMINE SULFATE 3.75; 3.75; 3.75; 3.75 MG/1; MG/1; MG/1; MG/1
15 CAPSULE, EXTENDED RELEASE ORAL EVERY MORNING
COMMUNITY
Start: 2024-07-11

## 2024-07-15 ASSESSMENT — PATIENT HEALTH QUESTIONNAIRE - PHQ9
5. POOR APPETITE OR OVEREATING: NEARLY EVERY DAY
3. TROUBLE FALLING OR STAYING ASLEEP OR SLEEPING TOO MUCH: NEARLY EVERY DAY
1. LITTLE INTEREST OR PLEASURE IN DOING THINGS: NEARLY EVERY DAY
7. TROUBLE CONCENTRATING ON THINGS, SUCH AS READING THE NEWSPAPER OR WATCHING TELEVISION: NEARLY EVERY DAY
4. FEELING TIRED OR HAVING LITTLE ENERGY: NEARLY EVERY DAY
SUM OF ALL RESPONSES TO PHQ QUESTIONS 1-9: 22
2. FEELING DOWN, DEPRESSED OR HOPELESS: NEARLY EVERY DAY
9. THOUGHTS THAT YOU WOULD BE BETTER OFF DEAD, OR OF HURTING YOURSELF: NOT AT ALL
6. FEELING BAD ABOUT YOURSELF - OR THAT YOU ARE A FAILURE OR HAVE LET YOURSELF OR YOUR FAMILY DOWN: NEARLY EVERY DAY
10. IF YOU CHECKED OFF ANY PROBLEMS, HOW DIFFICULT HAVE THESE PROBLEMS MADE IT FOR YOU TO DO YOUR WORK, TAKE CARE OF THINGS AT HOME, OR GET ALONG WITH OTHER PEOPLE: VERY DIFFICULT
SUM OF ALL RESPONSES TO PHQ9 QUESTIONS 1 AND 2: 6
8. MOVING OR SPEAKING SO SLOWLY THAT OTHER PEOPLE COULD HAVE NOTICED. OR THE OPPOSITE, BEING SO FIGETY OR RESTLESS THAT YOU HAVE BEEN MOVING AROUND A LOT MORE THAN USUAL: SEVERAL DAYS

## 2024-07-15 ASSESSMENT — ENCOUNTER SYMPTOMS
GASTROINTESTINAL NEGATIVE: 1
ENDOCRINE NEGATIVE: 1
NEUROLOGICAL NEGATIVE: 1
HEMATOLOGIC/LYMPHATIC NEGATIVE: 1
CONSTITUTIONAL NEGATIVE: 1
ALLERGIC/IMMUNOLOGIC NEGATIVE: 1
RESPIRATORY NEGATIVE: 1
CARDIOVASCULAR NEGATIVE: 1
PSYCHIATRIC NEGATIVE: 1
EYES NEGATIVE: 1
MUSCULOSKELETAL NEGATIVE: 1

## 2024-07-15 ASSESSMENT — LIFESTYLE VARIABLES
HOW MANY STANDARD DRINKS CONTAINING ALCOHOL DO YOU HAVE ON A TYPICAL DAY: PATIENT DOES NOT DRINK
AUDIT-C TOTAL SCORE: 0
SKIP TO QUESTIONS 9-10: 1
HOW OFTEN DO YOU HAVE A DRINK CONTAINING ALCOHOL: NEVER
HOW OFTEN DO YOU HAVE SIX OR MORE DRINKS ON ONE OCCASION: NEVER

## 2024-07-15 ASSESSMENT — PAIN SCALES - GENERAL: PAINLEVEL: 0-NO PAIN

## 2024-07-15 NOTE — PROGRESS NOTES
"Chief Complaint  Lizet Storm is a 28 y.o. female presenting for \"Establish Care (New patient establish care).\"    HPI     Lizet tSorm is a 28 y.o. female presenting NEW TO THE OFFICE, HERE FOR A PHYSICAL HAS ISSUES WITH HEARTBURN, TAKES OMEPRAZOLE.  PT HAS BEEN SOBER FOR A YEAR.        Past Medical History  Patient Active Problem List    Diagnosis Date Noted   • Moderate episode of recurrent major depressive disorder (Multi) 08/02/2023   • Opioid use disorder in remission 06/08/2021   • ADHD (attention deficit hyperactivity disorder), combined type 05/19/2014   • Anxiety disorder due to medical condition 05/19/2014        Medications  Current Outpatient Medications   Medication Instructions   • albuterol (Ventolin HFA) 90 mcg/actuation inhaler 2 puffs, inhalation, Every 4 hours PRN   • amphetamine-dextroamphetamine XR (Adderall XR) 15 mg 24 hr capsule 15 mg, oral, Every morning   • omeprazole (PRILOSEC) 40 mg, oral, Daily before breakfast, Do not crush or chew.        Surgical History  She has a past surgical history that includes Cosmetic surgery (2022); Cervical biopsy w/ loop electrode excision; and Tonsillectomy.     Social History  She reports that she has been smoking cigarettes. She has never been exposed to tobacco smoke. She has never used smokeless tobacco. She reports that she does not currently use alcohol. She reports that she does not use drugs.    Family History  No family history on file.     Allergies  Penicillin v    ROS  Review of Systems   Constitutional: Negative.    HENT: Negative.     Eyes: Negative.    Respiratory: Negative.     Cardiovascular: Negative.    Gastrointestinal: Negative.    Endocrine: Negative.    Genitourinary: Negative.    Musculoskeletal: Negative.    Skin: Negative.    Allergic/Immunologic: Negative.    Neurological: Negative.    Hematological: Negative.    Psychiatric/Behavioral: Negative.          Last Recorded Vitals  /80   Pulse 98   Temp 35.6 °C (96 " °F)   Wt 82.6 kg (182 lb)   SpO2 95%     Physical Exam  Vitals and nursing note reviewed.   Constitutional:       Appearance: Normal appearance.   HENT:      Head: Normocephalic and atraumatic.      Right Ear: Tympanic membrane, ear canal and external ear normal.      Left Ear: Tympanic membrane, ear canal and external ear normal.      Nose: Nose normal.      Mouth/Throat:      Mouth: Mucous membranes are moist.      Pharynx: Oropharynx is clear.   Eyes:      Extraocular Movements: Extraocular movements intact.      Conjunctiva/sclera: Conjunctivae normal.      Pupils: Pupils are equal, round, and reactive to light.   Neck:      Thyroid: No thyromegaly.   Cardiovascular:      Rate and Rhythm: Normal rate and regular rhythm.      Pulses: Normal pulses.      Heart sounds: Normal heart sounds, S1 normal and S2 normal.   Pulmonary:      Effort: Pulmonary effort is normal.      Breath sounds: Normal breath sounds. No wheezing or rhonchi.   Abdominal:      General: Bowel sounds are normal.      Palpations: Abdomen is soft. There is no mass.      Tenderness: There is no abdominal tenderness. There is no guarding.   Genitourinary:     Comments: Not examined  Musculoskeletal:         General: Normal range of motion.      Cervical back: Normal range of motion.      Right lower leg: No edema.      Left lower leg: No edema.   Lymphadenopathy:      Cervical: No cervical adenopathy.   Skin:     General: Skin is warm and dry.      Capillary Refill: Capillary refill takes less than 2 seconds.      Findings: No rash.   Neurological:      General: No focal deficit present.      Mental Status: She is alert and oriented to person, place, and time. Mental status is at baseline.      Cranial Nerves: Cranial nerves 2-12 are intact. No cranial nerve deficit.      Sensory: Sensation is intact.      Motor: Motor function is intact.      Coordination: Coordination is intact.      Gait: Gait is intact.   Psychiatric:         Mood and Affect:  Mood normal.         Behavior: Behavior normal.         Thought Content: Thought content normal.         Judgment: Judgment normal.       Relevant Results      Assessment/Plan   Lizet was seen today for establish care.  Diagnoses and all orders for this visit:  Well adult exam (Primary)  Dehydration  -     Referral to Primary Care  Wheezing  -     albuterol (Ventolin HFA) 90 mcg/actuation inhaler; Inhale 2 puffs every 4 hours if needed for wheezing or shortness of breath.  Gastroesophageal reflux disease with esophagitis, unspecified whether hemorrhage  -     omeprazole (PriLOSEC) 40 mg DR capsule; Take 1 capsule (40 mg) by mouth once daily in the morning. Take before meals. Do not crush or chew.          COUNSELING      Medication education:              Education:  The patient is counseled regarding potential side-effects of any and all new medications             Understanding:  Patient expressed understanding             Adherence:  No barriers to adherence identified        Rosetta Rosales, APRN-CNP

## 2024-07-16 ENCOUNTER — APPOINTMENT (OUTPATIENT)
Dept: CARDIOLOGY | Facility: CLINIC | Age: 29
End: 2024-07-16
Payer: COMMERCIAL

## 2024-07-16 VITALS
HEART RATE: 79 BPM | SYSTOLIC BLOOD PRESSURE: 129 MMHG | DIASTOLIC BLOOD PRESSURE: 88 MMHG | WEIGHT: 180 LBS | HEIGHT: 67 IN | BODY MASS INDEX: 28.25 KG/M2

## 2024-07-16 DIAGNOSIS — R55 NEAR SYNCOPE: ICD-10-CM

## 2024-07-16 DIAGNOSIS — Z72.0 TOBACCO ABUSE: Primary | ICD-10-CM

## 2024-07-16 PROCEDURE — 99406 BEHAV CHNG SMOKING 3-10 MIN: CPT

## 2024-07-16 PROCEDURE — 99204 OFFICE O/P NEW MOD 45 MIN: CPT

## 2024-07-16 NOTE — PATIENT INSTRUCTIONS
-Probably related to dehydration. No red flags at history of physical exam, EKG o ER labs  Recs:  -Hydration: Ensure an adequate hydration, especially during periods of prolonged sun exposure.  -Avoid Triggers: Avoid situations that may trigger vasovagal episodes, such as prolonged standing or dehydration.  -No other testing is advised at this moment. In case persistent symptoms despite avoiding triggers, please reconsult    #Tobacco abuse  -Stop smoking  -Cigarette smoking remains a leading contributor to cardiovascular morbidity and mortality worldwide. In [place] adults, smoking accounts for approximately 20 percent of cardiovascular deaths and almost one-third of deaths from coronary heart disease (CHD). Smoking is associated with an increased risk of death from heart disease across all age groups. The cardiovascular benefits of stopping smoking accrue almost immediately after quitting and increase over time. After one year of not smoking, the excess risk for CHD falls to 50 percent of that of current smokers. Even quitting after age 70 can reduce all-cause death  -Is important to know that quitting smoking is one of the most important things you can do to protect your health now and in the future.   -Free telephone quitline> 2-323-QUIT-NOW or 1-372.618.2563 can be used

## 2024-07-16 NOTE — PROGRESS NOTES
Cardiology     Recent evaluation in [facility]  28-year-old female who presented to the emergency room with dizziness, diaphoresis, lightheadedness, near syncope, and associated symptoms including shortness of breath and chest tightness. Initial assessment revealed the patient to be ill-appearing, pale, and diaphoretic. EKG showed normal sinus rhythm with no evidence of STEMI, Brugada syndrome, hypertrophic cardiomyopathy, or prolonged QT syndrome. Blood tests, including CBC, CMP, troponin, and D-dimer, were unremarkable, with troponin levels remaining flat and normal electrolytes and kidney function. The chest x-ray was clear. The patient was borderline hypotensive on arrival, which improved significantly after receiving 2 liters of IV fluids, suggesting a vasovagal episode or possible dehydration. The patient has a history of similar episodes without a definitive diagnosis. She tested positive for cannabinoids but was otherwise negative on the urine drug screen and not pregnant. The patient was discharged in stable condition     ===========================    At this visit  A 28-year-old woman with a history of ADHD, GERD, Tobacco abuse, drug and alcohol problems during her adolescence. She has been alcohol-free for 8 years. She smokes between half a pack to one pack of cigarettes per day since she was 14 years old. 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 She has no family history of cardiovascular disease. She does not use drugs and does not engage in sports. She has had approximately five similar episodes in her life to the most recent one, which ended in the emergency department. These episodes involve prolonged sun exposure, inadequate hydration, and a sensation of lightheadedness and near syncope that improves with hydration. In her daily life, she has normal functional capacity, with no history of palpitations or syncope outside of the previously described episodes, no symptoms of heart failure, and  "no chest pain.    Blood pressure today: 129/88 mmHg      Past Medical History:  She has no past medical history on file.    Past Surgical History:  She has a past surgical history that includes Cosmetic surgery (2022); Cervical biopsy w/ loop electrode excision; and Tonsillectomy.    Social History:  She reports that she has been smoking cigarettes. She has never been exposed to tobacco smoke. She has never used smokeless tobacco. She reports that she does not currently use alcohol. She reports that she does not use drugs.    Family History:  No family history on file.  Allergies:  Penicillin v    Outpatient Medications:  Current Outpatient Medications   Medication Instructions    albuterol (Ventolin HFA) 90 mcg/actuation inhaler 2 puffs, inhalation, Every 4 hours PRN    amphetamine-dextroamphetamine XR (Adderall XR) 15 mg 24 hr capsule 15 mg, oral, Every morning    omeprazole (PRILOSEC) 40 mg, oral, Daily before breakfast, Do not crush or chew.     Last Recorded Vitals:  There were no vitals filed for this visit.    Physical Exam:      7/15/2024     4:02 PM 6/26/2024     8:33 PM 6/26/2024     8:08 PM 6/26/2024     7:51 PM 6/26/2024     7:38 PM 6/26/2024     6:56 PM   Vitals   Systolic 110 121 109 101 90       MD aware 96   Diastolic 80 102 75 79 53       MD aware 49   Heart Rate 98  94   96   Temp 35.6 °C (96 °F)     36.5 °C (97.7 °F)   Resp   16   16   Height (in) 1.676 m (5' 6\")     1.676 m (5' 6\")   Weight (lb) 182     190   BMI 29.38 kg/m2     30.67 kg/m2   BSA (m2) 1.96 m2     2 m2   Visit Report Report          Wt Readings from Last 5 Encounters:   07/15/24 82.6 kg (182 lb)   06/26/24 86.2 kg (190 lb)   02/27/24 81.6 kg (180 lb)   02/07/24 87.9 kg (193 lb 12.8 oz)   01/18/22 75.3 kg (166 lb)       Physical Exam  Vitals reviewed.   HENT:      Head: Normocephalic.      Mouth/Throat:      Pharynx: Oropharynx is clear.   Eyes:      Pupils: Pupils are equal, round, and reactive to light.   Cardiovascular:      " "Rate and Rhythm: Normal rate.      Pulses: Normal pulses.      Heart sounds: Normal heart sounds.   Pulmonary:      Effort: Pulmonary effort is normal.      Breath sounds: Wheezing present.   Abdominal:      General: Abdomen is flat. Bowel sounds are normal.      Palpations: Abdomen is soft.   Musculoskeletal:         General: Normal range of motion.   Skin:     General: Skin is warm and dry.   Neurological:      General: No focal deficit present.      Mental Status: She is alert.   Psychiatric:         Mood and Affect: Mood normal.            Last Labs Reviewed:  CBC -  Recent Labs     06/26/24 1903 08/12/23 2146 09/24/22  0147 07/09/21  0950   WBC 17.5* 7.2 9.1 6.9   HGB 14.8 17.3* 14.5 14.1   HCT 43.4 47.3* 42.7 41.2    193 307 143*   MCV 88 89.2 90.3 96.7     CMP -  Recent Labs     06/26/24 1903 08/12/23 2146 09/24/22  0147 07/09/21  0950    136 137 136   K 3.5 3.3* 3.6 3.8    99 103 105   CO2 20* 19* 19* 18*   ANIONGAP 14 18 16 13   BUN 9 5* 6* 9   CREATININE 0.70 0.6 0.7 0.6   EGFR >90 125 121 127   MG 2.30  --   --   --    CALCIUM 9.5 8.9 9.0 9.6     Recent Labs     06/26/24 1903 08/12/23 2146 09/24/22  0147   ALBUMIN 4.4 4.5 4.4   ALKPHOS 73 74 56   ALT 18 51* 16   AST 17 54* 19   BILITOT 0.4 0.8 0.5     LIPID PANEL -   No results for input(s): \"CHOL\", \"LDLF\", \"LDLCALC\", \"HDL\", \"TRIG\" in the last 16711 hours.  COAGULATION PANEL -  No results for input(s): \"PTT\", \"INR\", \"HAUF\", \"DDIMERVTE\", \"HAPTOGLOBIN\", \"FIBRINOGEN\" in the last 04902 hours.  HEME/ENDO -  Recent Labs     11/28/22  1253 02/23/22  1451   HGBA1C 5.5 5.3     CARDIOVASCULAR  No results for input(s): \"LDH\", \"CKMB\", \"TROPHS\", \"BNP\", \"CRP\" in the last 61485 hours.    No lab exists for component: \"CK\", \"CKMBP\", \"CRPUS\", \"USCRP\"  Last Cardiology/Imaging Tests Personally Reviewed (if images available) and Interpreted:  ECG:  Encounter Date: 06/26/24   ECG 12 lead   Result Value    Ventricular Rate 95    Atrial Rate 95    MO " Interval 128    QRS Duration 94    QT Interval 372    QTC Calculation(Bazett) 467    P Axis 36    R Axis 71    T Axis 59    QRS Count 15    Q Onset 218    P Onset 154    P Offset 202    T Offset 404    QTC Fredericia 433    Narrative    Normal sinus rhythm  Normal ECG  No previous ECGs available  Confirmed by Matheus Betancourt ([id number]) on 6/27/2024 11:52:21 AM   ECG 12 lead 06/26/2024    Echocardiogram:  No results found for this or any previous visit from the past 1825 days.  No results found for this or any previous visit from the past 1095 days.    Cath:  No results found for this or any previous visit from the past 1825 days.  No results found for this or any previous visit from the past 3650 days.  No results found for this or any previous visit from the past 1095 days.    Stress Test:  No results found for this or any previous visit from the past 1825 days.  No results found for this or any previous visit from the past 1095 days.    Cardiac CT/MRI:  No results found for this or any previous visit from the past 1825 days.  No results found for this or any previous visit from the past 1095 days.    Other CT:      CV RISK FACTORS:   # Hypertension: Last BP:  .  # Hyperlipidemia: Last Tchol No results found for requested labs within last 365 days. / LDL No results found for requested labs within last 365 days. / HDL No results found for requested labs within last 365 days. / TRIG No results found for requested labs within last 365 days. (No results in last year.).  # Type II Diabetes Mellitus: Last A1c No results found for requested labs within last 365 days. (No results in last year.).  # Obesity: Last BMI:  .  # CKD: Last BUN/Cr (GFR): 9/0.70 (>90), 6/26/2024:  7:03 PM.    ASCV RISK:  The ASCVD Risk score (Candie DK, et al., 2019) failed to calculate for the following reasons:    The 2019 ASCVD risk score is only valid for ages 40 to 79    Assessment/Plan   A 28-year-old woman with a history of drug and alcohol  problems during her adolescence. She has been alcohol-free for 8 years. She smokes between half a pack to one pack of cigarettes per day since she was 14 years old. 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 She has no family history of cardiovascular disease. She does not use drugs and does not engage in sports. She has had approximately five similar episodes in her life to the most recent one, which ended in the emergency department. These episodes involve prolonged sun exposure, inadequate hydration, and a sensation of lightheadedness and near syncope that improves with hydration. In her daily life, she has normal functional capacity, with no history of palpitations or syncope outside of the previously described episodes, no symptoms of heart failure, and no chest pain.    #Near syncope  -Probably related to dehydration. No red flags at history of physical exam, EKG o ER labs  Recs:  -Hydration: Ensure an adequate hydration, especially during periods of prolonged sun exposure.  -Avoid Triggers: Avoid situations that may trigger vasovagal episodes, such as prolonged standing or dehydration.  -No other testing is advised at this moment. In case persistent symptoms despite avoiding triggers, please reconsult    #Tobacco abuse  -Stop smoking  -Cigarette smoking remains a leading contributor to cardiovascular morbidity and mortality worldwide. In [place] adults, smoking accounts for approximately 20 percent of cardiovascular deaths and almost one-third of deaths from coronary heart disease (CHD). Smoking is associated with an increased risk of death from heart disease across all age groups. The cardiovascular benefits of stopping smoking accrue almost immediately after quitting and increase over time. After one year of not smoking, the excess risk for CHD falls to 50 percent of that of current smokers. Even quitting after age 70 can reduce all-cause death  -Is important to know that quitting smoking is one  of the most important things you can do to protect your health now and in the future.   -Free telephone quitline> 3-165-QUIT-NOW or 1-101.445.5505 can be used    - No Cardiology follow up required, patient will return as needed  - I spent 45 minutes assessing the case between pre-charting, face-to-face patient interaction, and documentation    Blaine Lyman MD

## 2024-07-25 ENCOUNTER — TELEPHONE (OUTPATIENT)
Dept: OBSTETRICS AND GYNECOLOGY | Facility: CLINIC | Age: 29
End: 2024-07-25
Payer: COMMERCIAL

## 2024-07-25 NOTE — TELEPHONE ENCOUNTER
Patient called and left a VM stating she had some questions for Dr. Brizuela (she did not disclose what it was pertaining too.) I tried calling her back before I left for the day

## 2024-07-26 RX ORDER — ARIPIPRAZOLE 5 MG/1
5 TABLET ORAL DAILY
COMMUNITY
Start: 2024-07-25

## 2024-07-26 RX ORDER — CITALOPRAM 40 MG/1
40 TABLET, FILM COATED ORAL DAILY
COMMUNITY
Start: 2024-07-25

## 2024-07-26 NOTE — TELEPHONE ENCOUNTER
Pt not taking any hormones or BC since Dec.  Pt went back on psych meds and wants to know  if Celexa and Abilify will interfere with fertility as she is trying to conceive.

## 2024-07-26 NOTE — TELEPHONE ENCOUNTER
There may be risk to developing fetus when exposed to Abilify in the first trimester. It is category C.   Celexa has not been linked to major malformations in the fetus. Risk cannot be ruled out. Use if benefit to mom outweighs to fetus. Be sure prescribers are aware of her desire for pregnancy.

## 2024-09-14 ENCOUNTER — HOSPITAL ENCOUNTER (EMERGENCY)
Facility: HOSPITAL | Age: 29
Discharge: HOME | End: 2024-09-15
Payer: COMMERCIAL

## 2024-09-14 VITALS
TEMPERATURE: 97.7 F | HEART RATE: 80 BPM | BODY MASS INDEX: 28.25 KG/M2 | WEIGHT: 180 LBS | HEIGHT: 67 IN | OXYGEN SATURATION: 100 % | RESPIRATION RATE: 18 BRPM | SYSTOLIC BLOOD PRESSURE: 128 MMHG | DIASTOLIC BLOOD PRESSURE: 90 MMHG

## 2024-09-14 DIAGNOSIS — K04.7 DENTAL ABSCESS: ICD-10-CM

## 2024-09-14 DIAGNOSIS — K02.9 PAIN DUE TO DENTAL CARIES: Primary | ICD-10-CM

## 2024-09-14 PROCEDURE — 99283 EMERGENCY DEPT VISIT LOW MDM: CPT

## 2024-09-14 RX ORDER — KETOROLAC TROMETHAMINE 30 MG/ML
15 INJECTION, SOLUTION INTRAMUSCULAR; INTRAVENOUS ONCE
Status: COMPLETED | OUTPATIENT
Start: 2024-09-14 | End: 2024-09-15

## 2024-09-14 RX ORDER — AMOXICILLIN AND CLAVULANATE POTASSIUM 875; 125 MG/1; MG/1
1 TABLET, FILM COATED ORAL ONCE
Status: COMPLETED | OUTPATIENT
Start: 2024-09-14 | End: 2024-09-15

## 2024-09-14 ASSESSMENT — COLUMBIA-SUICIDE SEVERITY RATING SCALE - C-SSRS
1. IN THE PAST MONTH, HAVE YOU WISHED YOU WERE DEAD OR WISHED YOU COULD GO TO SLEEP AND NOT WAKE UP?: NO
6. HAVE YOU EVER DONE ANYTHING, STARTED TO DO ANYTHING, OR PREPARED TO DO ANYTHING TO END YOUR LIFE?: NO
2. HAVE YOU ACTUALLY HAD ANY THOUGHTS OF KILLING YOURSELF?: NO

## 2024-09-14 ASSESSMENT — LIFESTYLE VARIABLES
EVER FELT BAD OR GUILTY ABOUT YOUR DRINKING: NO
TOTAL SCORE: 0
EVER HAD A DRINK FIRST THING IN THE MORNING TO STEADY YOUR NERVES TO GET RID OF A HANGOVER: NO
HAVE YOU EVER FELT YOU SHOULD CUT DOWN ON YOUR DRINKING: NO
HAVE PEOPLE ANNOYED YOU BY CRITICIZING YOUR DRINKING: NO

## 2024-09-14 ASSESSMENT — PAIN SCALES - GENERAL: PAINLEVEL_OUTOF10: 10 - WORST POSSIBLE PAIN

## 2024-09-14 ASSESSMENT — PAIN - FUNCTIONAL ASSESSMENT: PAIN_FUNCTIONAL_ASSESSMENT: 0-10

## 2024-09-14 ASSESSMENT — PAIN DESCRIPTION - PAIN TYPE: TYPE: ACUTE PAIN

## 2024-09-14 ASSESSMENT — PAIN DESCRIPTION - LOCATION: LOCATION: MOUTH

## 2024-09-15 ENCOUNTER — HOSPITAL ENCOUNTER (EMERGENCY)
Facility: HOSPITAL | Age: 29
Discharge: HOME | End: 2024-09-15
Attending: EMERGENCY MEDICINE
Payer: COMMERCIAL

## 2024-09-15 VITALS
SYSTOLIC BLOOD PRESSURE: 121 MMHG | HEART RATE: 89 BPM | OXYGEN SATURATION: 100 % | WEIGHT: 180 LBS | TEMPERATURE: 97.3 F | RESPIRATION RATE: 20 BRPM | BODY MASS INDEX: 28.25 KG/M2 | HEIGHT: 67 IN | DIASTOLIC BLOOD PRESSURE: 80 MMHG

## 2024-09-15 DIAGNOSIS — K04.7 DENTAL INFECTION: ICD-10-CM

## 2024-09-15 DIAGNOSIS — K08.89 PAIN, DENTAL: Primary | ICD-10-CM

## 2024-09-15 DIAGNOSIS — I10 DIASTOLIC HYPERTENSION: ICD-10-CM

## 2024-09-15 LAB — HCG UR QL IA.RAPID: NEGATIVE

## 2024-09-15 PROCEDURE — 81025 URINE PREGNANCY TEST: CPT | Performed by: PHYSICIAN ASSISTANT

## 2024-09-15 PROCEDURE — 2500000004 HC RX 250 GENERAL PHARMACY W/ HCPCS (ALT 636 FOR OP/ED): Performed by: PHYSICIAN ASSISTANT

## 2024-09-15 PROCEDURE — 2500000001 HC RX 250 WO HCPCS SELF ADMINISTERED DRUGS (ALT 637 FOR MEDICARE OP): Performed by: PHYSICIAN ASSISTANT

## 2024-09-15 PROCEDURE — 99283 EMERGENCY DEPT VISIT LOW MDM: CPT

## 2024-09-15 PROCEDURE — 96372 THER/PROPH/DIAG INJ SC/IM: CPT | Performed by: PHYSICIAN ASSISTANT

## 2024-09-15 RX ORDER — KETOROLAC TROMETHAMINE 30 MG/ML
30 INJECTION, SOLUTION INTRAMUSCULAR; INTRAVENOUS ONCE
Status: COMPLETED | OUTPATIENT
Start: 2024-09-15 | End: 2024-09-15

## 2024-09-15 RX ORDER — CHLORHEXIDINE GLUCONATE ORAL RINSE 1.2 MG/ML
15 SOLUTION DENTAL AS NEEDED
Qty: 120 ML | Refills: 0 | Status: SHIPPED | OUTPATIENT
Start: 2024-09-15 | End: 2024-09-29

## 2024-09-15 RX ORDER — ACETAMINOPHEN 325 MG/1
975 TABLET ORAL ONCE
Status: COMPLETED | OUTPATIENT
Start: 2024-09-15 | End: 2024-09-15

## 2024-09-15 RX ORDER — KETOROLAC TROMETHAMINE 10 MG/1
10 TABLET, FILM COATED ORAL EVERY 6 HOURS PRN
Qty: 20 TABLET | Refills: 0 | Status: SHIPPED | OUTPATIENT
Start: 2024-09-15

## 2024-09-15 RX ORDER — AMOXICILLIN AND CLAVULANATE POTASSIUM 875; 125 MG/1; MG/1
1 TABLET, FILM COATED ORAL EVERY 12 HOURS
Qty: 14 TABLET | Refills: 0 | Status: SHIPPED | OUTPATIENT
Start: 2024-09-15 | End: 2024-09-22

## 2024-09-15 RX ORDER — ACETAMINOPHEN 325 MG/1
650 TABLET ORAL EVERY 6 HOURS PRN
Qty: 30 TABLET | Refills: 0 | Status: SHIPPED | OUTPATIENT
Start: 2024-09-15 | End: 2024-09-25

## 2024-09-15 RX ADMIN — AMOXICILLIN AND CLAVULANATE POTASSIUM 1 TABLET: 875; 125 TABLET, FILM COATED ORAL at 00:33

## 2024-09-15 RX ADMIN — KETOROLAC TROMETHAMINE 15 MG: 30 INJECTION, SOLUTION INTRAMUSCULAR at 00:33

## 2024-09-15 ASSESSMENT — LIFESTYLE VARIABLES
EVER HAD A DRINK FIRST THING IN THE MORNING TO STEADY YOUR NERVES TO GET RID OF A HANGOVER: NO
HAVE PEOPLE ANNOYED YOU BY CRITICIZING YOUR DRINKING: NO
EVER FELT BAD OR GUILTY ABOUT YOUR DRINKING: NO
TOTAL SCORE: 0
HAVE YOU EVER FELT YOU SHOULD CUT DOWN ON YOUR DRINKING: NO

## 2024-09-15 ASSESSMENT — PAIN DESCRIPTION - LOCATION: LOCATION: TEETH

## 2024-09-15 ASSESSMENT — PAIN SCALES - GENERAL
PAINLEVEL_OUTOF10: 9
PAINLEVEL_OUTOF10: 9

## 2024-09-15 ASSESSMENT — PAIN DESCRIPTION - PROGRESSION: CLINICAL_PROGRESSION: NOT CHANGED

## 2024-09-15 ASSESSMENT — PAIN DESCRIPTION - PAIN TYPE: TYPE: ACUTE PAIN

## 2024-09-15 ASSESSMENT — PAIN DESCRIPTION - ORIENTATION: ORIENTATION: RIGHT

## 2024-09-15 ASSESSMENT — PAIN DESCRIPTION - FREQUENCY: FREQUENCY: CONSTANT/CONTINUOUS

## 2024-09-15 ASSESSMENT — PAIN - FUNCTIONAL ASSESSMENT: PAIN_FUNCTIONAL_ASSESSMENT: 0-10

## 2024-09-15 NOTE — ED PROVIDER NOTES
HPI   Chief Complaint   Patient presents with   • Dental Pain       HPI        Patient History   No past medical history on file.  Past Surgical History:   Procedure Laterality Date   • CERVICAL BIOPSY  W/ LOOP ELECTRODE EXCISION     • COSMETIC SURGERY  2022    BBL & lipo   • TONSILLECTOMY       No family history on file.  Social History     Tobacco Use   • Smoking status: Every Day     Current packs/day: 0.50     Types: Cigarettes     Passive exposure: Never   • Smokeless tobacco: Never   Vaping Use   • Vaping status: Every Day   • Substances: Nicotine, THC, CBD, Flavoring   • Devices: Disposable, Pre-filled or refillable cartridge   Substance Use Topics   • Alcohol use: Not Currently   • Drug use: Never       Physical Exam   ED Triage Vitals [09/14/24 2317]   Temperature Heart Rate Respirations BP   36.5 °C (97.7 °F) 80 18 128/90      Pulse Ox Temp Source Heart Rate Source Patient Position   100 % Temporal Monitor --      BP Location FiO2 (%)     -- --       Physical Exam      ED Course & MDM   Diagnoses as of 09/15/24 0037   Pain due to dental caries   Dental abscess                 No data recorded     [place] Coma Scale Score: 15 (09/14/24 2320 : Delia Kang RN)                           Medical Decision Making  Parts of this chart have been completed using voice recognition software. Please excuse any errors of transcription.  My thought process and reason for plan has been formulated from the time that I saw the patient until the time of disposition and is not specific to one specific moment during their visit and furthermore my MDM encompasses this entire chart and not only this text box.      HPI: Detailed above.    Exam: A medically appropriate exam performed, outlined above, given the known history and presentation.    History obtained from: ***    EKG: ***    Social Determinants of Health considered during this visit: ***    Medications given during visit:  Medications   amoxicillin-pot clavulanate  (Augmentin) 875-125 mg per tablet 1 tablet (1 tablet oral Given 9/15/24 0033)   ketorolac (Toradol) injection 15 mg (15 mg intramuscular Given 9/15/24 0033)        Diagnostic/tests  Labs Reviewed   HCG, URINE, QUALITATIVE      No orders to display        Considerations/further MDM:  ***    Co-sign ***      Procedure  Procedures

## 2024-09-15 NOTE — DISCHARGE INSTRUCTIONS
Please take Augmentin as prescribed.  You may use ketorolac as needed for pain.  Do not use any other NSAIDs with ketorolac such as ibuprofen, naproxen.  Please use Peridex mouthwash as prescribed.  Follow-up with your dentist as scheduled.  Return to the emergency department if you begin to have fevers, swelling of your face, swelling of your neck, pain with swallowing or difficulty swallowing    I also recommend to use warm compresses to your left side of your face frequently.

## 2024-09-15 NOTE — ED PROVIDER NOTES
HPI   Chief Complaint   Patient presents with    Dental Pain     Pt was here last night for dental pain and sts the pain meds aren't helping her with her pain. Pt sts 9/10 pain at this time.        28-year-old female presented emergency department the chief complaint of dental pain.  She was seen overnight at the emergency department.  He was prescribed antibiotic of which she has not taken any yet.  The pain has not improved so she presented back to the emergency department.  She is well-appearing nontoxic afebrile.  Denies injury or trauma.  No other complaint.  She is able to tolerate by mouth.  Her vital signs are normal.              Patient History   No past medical history on file.  Past Surgical History:   Procedure Laterality Date    CERVICAL BIOPSY  W/ LOOP ELECTRODE EXCISION      COSMETIC SURGERY  2022    BBL & lipo    TONSILLECTOMY       No family history on file.  Social History     Tobacco Use    Smoking status: Every Day     Current packs/day: 0.50     Types: Cigarettes     Passive exposure: Never    Smokeless tobacco: Never   Vaping Use    Vaping status: Every Day    Substances: Nicotine, THC, CBD, Flavoring    Devices: Disposable, Pre-filled or refillable cartridge   Substance Use Topics    Alcohol use: Not Currently    Drug use: Never       Physical Exam   ED Triage Vitals [09/15/24 1640]   Temperature Heart Rate Respirations BP   36.3 °C (97.3 °F) 89 20 121/80      Pulse Ox Temp Source Heart Rate Source Patient Position   100 % Temporal Monitor Sitting      BP Location FiO2 (%)     Left arm --       Physical Exam  Vitals and nursing note reviewed.   Constitutional:       Appearance: Normal appearance.   HENT:      Head: Normocephalic.      Comments: Dental caries without abscess     Nose: Nose normal.      Mouth/Throat:      Mouth: Mucous membranes are moist.   Cardiovascular:      Rate and Rhythm: Normal rate.      Pulses: Normal pulses.   Pulmonary:      Effort: Pulmonary effort is normal.    Abdominal:      General: Abdomen is flat.   Musculoskeletal:         General: Normal range of motion.      Cervical back: Normal range of motion.   Skin:     General: Skin is warm.   Neurological:      General: No focal deficit present.      Mental Status: She is alert and oriented to person, place, and time.   Psychiatric:         Mood and Affect: Mood normal.           ED Course & MDM   Diagnoses as of 09/15/24 1650   Pain, dental   Dental infection                 No data recorded     [place] Coma Scale Score: 15 (09/15/24 1641 : Minna Shankar RN)                           Medical Decision Making  I have seen and evaluated this patient.  The attending physician has also seen and evaluated this patient.  Vital signs, laboratory testing and diagnostic images if applicable have been reviewed.  All laboratory and imaging is interpreted by myself unless otherwise stated.  Radiology studies are also formally interpreted by radiologist.    Patient is currently on antibiotic.  Will add benzocaine.  Will give dental referral.  There is no drainable abscess.  There is patent airway without airway compromise or respiratory distress.  Suspect patient will have improvement of symptoms after several more days of antibiotic.  Narcotic not indicated.  Released in stable condition.        Labs Reviewed - No data to display  No orders to display     Medications   ketorolac (Toradol) injection 30 mg (has no administration in time range)   acetaminophen (Tylenol) tablet 975 mg (has no administration in time range)     New Prescriptions    ACETAMINOPHEN (TYLENOL) 325 MG TABLET    Take 2 tablets (650 mg) by mouth every 6 hours if needed for mild pain (1 - 3) for up to 10 days.    BENZOCAINE (ORAJEL) 10 % MUCOSAL GEL    Use in the mouth or throat 4 times a day as needed (dental pain).         Procedure  Procedures     Burt Lewis PA-C  09/15/24 1651       Burt Lewis PA-C  09/15/24 1651

## 2024-09-15 NOTE — ED PROVIDER NOTES
HPI   Chief Complaint   Patient presents with    Dental Pain     Pt was here last night for dental pain and sts the pain meds aren't helping her with her pain. Pt sts 9/10 pain at this time.        Is a 28-year-old female presenting to the emergency department with complaints of left lower molar pain.  Patient states over the last few months she has been having dental pain on the left lower molar.  She states that over the last 3 days she has been having increasing pain.  She states swishing and spitting with salt water and Listerine helps temporarily but the pain returns.  She denies purulent drainage.  No swelling of her neck or face.  No difficulty speaking, or swallowing.  No shortness of breath.  She denies any fevers or chills.  She does have an appointment with her dentist this coming up Friday for cavity filling but cannot wait due to pain.        Please see HPI for pertinent positive and negative ROS.       Patient History   No past medical history on file.  Past Surgical History:   Procedure Laterality Date    CERVICAL BIOPSY  W/ LOOP ELECTRODE EXCISION      COSMETIC SURGERY  2022    BBL & lipo    TONSILLECTOMY       No family history on file.  Social History     Tobacco Use    Smoking status: Every Day     Current packs/day: 0.50     Types: Cigarettes     Passive exposure: Never    Smokeless tobacco: Never   Vaping Use    Vaping status: Every Day    Substances: Nicotine, THC, CBD, Flavoring    Devices: Disposable, Pre-filled or refillable cartridge   Substance Use Topics    Alcohol use: Not Currently    Drug use: Never       Physical Exam   ED Triage Vitals [09/15/24 1640]   Temperature Heart Rate Respirations BP   36.3 °C (97.3 °F) 89 20 121/80      Pulse Ox Temp Source Heart Rate Source Patient Position   100 % Temporal Monitor Sitting      BP Location FiO2 (%)     Left arm --       Physical Exam  GENERAL APPEARANCE: This patient is in no acute respiratory distress. Awake and alert, talking  appropriately. Answering questions appropriately. No evidence of pressured speech  VITAL SIGNS: As per the nurses' triage record.  HEENT: Normocephalic, atraumatic.  Patient has tenderness palpation over the left lower posterior gumline.  Teeth are intact.  There is no fluctuance appreciated.  There is no purulent drainage with manipulation.  No swelling of her maxillary region or cervical region on the right side.  No mastoid bone erythema or edema bilaterally.  No tenderness to palpation over the floor the mouth, no fluctuance over the floor the mouth.  NECK:  full gross ROM during exam.  No tenderness or swelling over the submandibular, anterior and posterior cervical region bilaterally.  MUSCULOSKELETAL:  Full gross active range of motion. Ambulating on own with no acute difficulties  NEUROLOGICAL: Awake, alert and oriented x 3.  IMMUNOLOGICAL: No palpable lymphadenopathy or lymphatic streaking noted on visible skin.  DERM: No petechiae, rashes, or ecchymoses on visible skin  PSYCH: mood and affect appear normal.      ED Course & MDM   Diagnoses as of 09/15/24 1712   Pain, dental   Dental infection   Diastolic hypertension                 No data recorded     [place] Coma Scale Score: 15 (09/15/24 1641 : Minna Shankar RN)                           Medical Decision Making  Parts of this chart have been completed using voice recognition software. Please excuse any errors of transcription.  My thought process and reason for plan has been formulated from the time that I saw the patient until the time of disposition and is not specific to one specific moment during their visit and furthermore my MDM encompasses this entire chart and not only this text box.      HPI: Detailed above.    Exam: A medically appropriate exam performed, outlined above, given the known history and presentation.    History obtained from: Patient    Medications given during visit:  Medications   ketorolac (Toradol) injection 30 mg (30 mg  intramuscular Given 9/15/24 1700)   acetaminophen (Tylenol) tablet 975 mg (975 mg oral Given 9/15/24 1700)        Diagnostic/tests  Labs Reviewed - No data to display   No orders to display        Considerations/further MDM:    Patient presents with stable vital signs.    Negative pregnancy test.    Differential diagnosis includes was not limited to dental abscess versus pulpitis versus gingivitis versus [place] angina versus deep space abscess.  I do have low suspicion for deep space abscess or [place] angina due to unremarkable physical exam findings, no fever or chills.  Patient was treated with IM Toradol and given her first dose of Augmentin.  She was discharged with a prescription for Augmentin and Toradol.  She was told to follow-up with her dentist or primary care doctor.  She was given strict return precautions to the emergency department.  She felt comfortable discharge plan home.  She was started stable condition in the company of her significant other.      Procedure  Procedures     Lashawn Farmer PA-C  09/15/24 3318

## 2024-09-15 NOTE — DISCHARGE INSTRUCTIONS
Follow-up with your primary care physician within 1 to 2 days for further management of your current symptoms and repeat check of your blood pressure.    Follow-up with the dentist within 2 to 3 days for further management of your symptoms.  You may call an emergency dentist if you feel like you need to be seen sooner.      Return to the emergency department sooner with worsening of symptoms or onset of new symptoms

## 2024-09-15 NOTE — ED TRIAGE NOTES
Pt presents ambulatory through triage with c/o dental pain x1 week. Pt has a known cavity and has an appointment scheduled for Friday but states she cannot control the pain. Pt has been taking tylenol and ibuprofen around the clock. Pt states she last took an ibuprofen on her way into the ER.

## 2024-09-15 NOTE — Clinical Note
Lizet Storm was seen and treated in our emergency department on 9/15/2024.  She may return to work on 09/15/2024.       If you have any questions or concerns, please don't hesitate to call.      Dolores Quintanilla MD

## 2024-09-15 NOTE — PROGRESS NOTES
Attestation/Supervisory note for МАРИНА Lewis      The patient is a 28-year-old female presenting to the emergency department for evaluation of left lower rear molar dental pain.  She states that she has had the pain for the past several days.  She states that she came to the emergency room early this morning, around 0100, for the same symptoms.  She states that she does have an appointment scheduled with her dentist next week but she came back to the emergency room because of persistent symptoms.  No difficulty swallowing or speaking.  No headache or visual changes.  No focal weakness or numbness.  No fever or chills.  No recent injury or trauma.  No chest pain or shortness of breath.  No abdominal pain.  No nausea vomiting.  No diarrhea or constipation. no urinary complaints.  All pertinent positives and negatives are recorded above.  All other systems reviewed and otherwise negative.  Vital signs with mild diastolic hypertension but otherwise within normal limits.  Physical exam with a well-nourished well-developed female in no acute distress.  HEENT exam with mild widespread dental decay but no visible or palpable abscess.  No pooling of secretions.  No stridor.  No difficulty speaking.  No evidence of airway compromise or respiratory distress.  Abdominal exam is benign.  She does not have any gross motor, neurologic or vascular deficits on exam.      There is no indication for emergent imaging and/or diagnostic testing at this time.      Oral acetaminophen and IM Toradol ordered for symptom relief      The patient was released in good condition.  She was instructed to follow-up with her primary care physician within 1 to 2 days for further management of her current symptoms and repeat check of her blood pressure.  She was also given a referral to the on-call dentist.  She was instructed to return to the emergency department sooner with worsening of symptoms or onset of new symptoms.  She will continue to use the  medications previously prescribed from her previous visit to the emergency department.      Impression/diagnosis:  Dental pain, left lower rear molar  Diastolic hypertension      I personally saw the patient and made/approve the management plan and take responsibility for the patient management.      I personally discussed the patient's management with the patient        Dolores Quintanilla MD

## 2024-09-18 NOTE — ED PROVIDER NOTES
HPI   Chief Complaint   Patient presents with    Dental Pain       HPI  I saw this patient on 9/14/2024.  I completed my note on 9/15/2024 at 1712.  Please refer to note signed on 9/15/2024 at 1712 for documentation on this patient from encounter on 9/14/2024      Patient History   No past medical history on file.  Past Surgical History:   Procedure Laterality Date    CERVICAL BIOPSY  W/ LOOP ELECTRODE EXCISION      COSMETIC SURGERY  2022    BBL & lipo    TONSILLECTOMY       No family history on file.  Social History     Tobacco Use    Smoking status: Every Day     Current packs/day: 0.50     Types: Cigarettes     Passive exposure: Never    Smokeless tobacco: Never   Vaping Use    Vaping status: Every Day    Substances: Nicotine, THC, CBD, Flavoring    Devices: Disposable, Pre-filled or refillable cartridge   Substance Use Topics    Alcohol use: Not Currently    Drug use: Never       Physical Exam   ED Triage Vitals [09/14/24 2317]   Temperature Heart Rate Respirations BP   36.5 °C (97.7 °F) 80 18 128/90      Pulse Ox Temp Source Heart Rate Source Patient Position   100 % Temporal Monitor --      BP Location FiO2 (%)     -- --       Physical Exam      ED Course & MDM   Diagnoses as of 09/18/24 1227   Pain due to dental caries   Dental abscess                 No data recorded     [place] Coma Scale Score: 15 (09/14/24 2320 : Delia Kang RN)                           Medical Decision Making      Procedure  Procedures     Lashawn Farmer PA-C  09/18/24 1228       Lashawn Myers PA-C  10/08/24 2150

## 2024-11-11 ENCOUNTER — OFFICE VISIT (OUTPATIENT)
Dept: OBSTETRICS AND GYNECOLOGY | Facility: CLINIC | Age: 29
End: 2024-11-11
Payer: COMMERCIAL

## 2024-11-11 ENCOUNTER — HOSPITAL ENCOUNTER (OUTPATIENT)
Dept: RADIOLOGY | Facility: EXTERNAL LOCATION | Age: 29
Discharge: HOME | End: 2024-11-11

## 2024-11-11 VITALS — BODY MASS INDEX: 30.07 KG/M2 | DIASTOLIC BLOOD PRESSURE: 82 MMHG | WEIGHT: 192 LBS | SYSTOLIC BLOOD PRESSURE: 116 MMHG

## 2024-11-11 DIAGNOSIS — Z31.69 ENCOUNTER FOR PRECONCEPTION CONSULTATION: Primary | ICD-10-CM

## 2024-11-11 DIAGNOSIS — Z31.69 ENCOUNTER FOR PRECONCEPTION CONSULTATION: ICD-10-CM

## 2024-11-11 DIAGNOSIS — Z71.6 ENCOUNTER FOR TOBACCO USE CESSATION COUNSELING: ICD-10-CM

## 2024-11-11 PROCEDURE — 99213 OFFICE O/P EST LOW 20 MIN: CPT | Performed by: OBSTETRICS & GYNECOLOGY

## 2024-11-11 ASSESSMENT — ENCOUNTER SYMPTOMS
OCCASIONAL FEELINGS OF UNSTEADINESS: 0
DEPRESSION: 0
LOSS OF SENSATION IN FEET: 0

## 2024-11-11 ASSESSMENT — PATIENT HEALTH QUESTIONNAIRE - PHQ9
2. FEELING DOWN, DEPRESSED OR HOPELESS: NOT AT ALL
SUM OF ALL RESPONSES TO PHQ9 QUESTIONS 1 & 2: 0
1. LITTLE INTEREST OR PLEASURE IN DOING THINGS: NOT AT ALL

## 2024-11-11 NOTE — PROGRESS NOTES
GYN OFFICE VISIT    Patient Name:  Lizet Storm  :  1995  MR #:  [id number]  Acct #:  [id number]      ASSESSMENT/PLAN:   Diagnoses and all orders for this visit:  Encounter for preconception consultation  -     Referral to [facility]; Future  -     TSH; Future  -     Testosterone, total and free; Future  -     DHEA-Sulfate; Future  -     DHEA; Future  -     CBC and Auto Differential; Future  -     HCG, quantitative, pregnancy; Future  -     FSH & LH; Future  -     US pelvis hysterosonography doppler; Future  -     Estradiol; Future  -     Progesterone; Future  Encounter for tobacco use cessation counseling    -Discussed saline sonogram with [employer] see.  Referral placed for Dr. Whalen.  -Discussed functional medicine to improve her pelvic circulation and functionality of her pelvic floor.   -Sent support to the portal.  Informed of -8 [id number]-QuitNOW.  Declines cessation aids.  States that her and her partner are down to less than 5 cigarettes/day.  -Medication education:   All new and/or current medications discussed and reviewed including side effects with patient/caregiver, Understanding:  Caregiver/Patient expressed understanding., Adherence:  Barriers to adherence identified and discussed if present.  -Preventative hygiene/STIs reviewed.     Follow-up will depend on results.      Chief Complaint   Patient presents with    Discuss conceiving         Lizet Storm is a 29 y.o. C. Female who presents for fertility discussion.  She desires pregnancy.  They have been trying for a year now..   Patient's last menstrual period was 10/18/2024 (exact date)..  Reguler with monthly Ovulating Day 12-14. C/O occasional N/V, thought she was pregnant but urine pregnancy test come back negative.   had a normal semen analysis .  He states it was a $60 test.  She does not understand why she cannot get pregnant, last year's labs were normal.  She states she was fighting some illness during her last  CBC.  Down to 3 cigarettes per day.  Both parties are trying to quit.  She admits that she has a history of alcoholism long enough that she is worried now that she has not gotten pregnant.      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      Current Outpatient Medications:     albuterol (Ventolin HFA) 90 mcg/actuation inhaler, Inhale 2 puffs every 4 hours if needed for wheezing or shortness of breath., Disp: 8 g, Rfl: 5    ARIPiprazole (Abilify) 5 mg tablet, Take 1 tablet (5 mg) by mouth once daily., Disp: , Rfl:     benzocaine (Orajel) 10 % mucosal gel, Use in the mouth or throat 4 times a day as needed (dental pain)., Disp: 5.3 g, Rfl: 0    citalopram (CeleXA) 40 mg tablet, Take 1 tablet (40 mg) by mouth once daily., Disp: , Rfl:     omeprazole (PriLOSEC) 40 mg DR capsule, Take 1 capsule (40 mg) by mouth once daily in the morning. Take before meals. Do not crush or chew., Disp: 90 capsule, Rfl: 1    amphetamine-dextroamphetamine XR (Adderall XR) 15 mg 24 hr capsule, Take 1 capsule (15 mg) by mouth once daily in the morning. (Patient not taking: Reported on 11/11/2024), Disp: , Rfl:     ketorolac (Toradol) 10 mg tablet, Take 1 tablet (10 mg) by mouth every 6 hours if needed for moderate pain (4 - 6). (Patient not taking: Reported on 11/11/2024), Disp: 20 tablet, Rfl: 0     No Known Allergies      ROS:  W[facility] - GENERAL:          Chills no.  Fever no.  Loss of Weight no.  Sweats no.   Fatigue no.  Weight gain yes, fluctuates during smoking cessation.   W[facility] - GASTROINTESTINAL:          Appetite Poor Sometimes.   Constipation no.  Diarrhea no.  Indigestion no.   Stomach Pain no    W[facility] - GENITO-URINARY:          Blood in Urine no.  Frequent Urination no.  Lack of Bladder Control no.  Painful Urination no.  Heavy/Irregular periods no.  Vaginal discharge no.  Vaginal odor no.  Vaginal itching no.  Post-coital bleeding no.      W[facility] - SKIN:          Bruise easily no.  Itching no.    Rash no.  Sore that won't heal no.  Vulvar Lesions no.  Acne no acute problems.      W[facility] - ROS Update:          Depression or anxiety problems no.        OBJECTIVE:   /82   Wt 87.1 kg (192 lb)   LMP 10/18/2024 (Exact Date)   BMI 30.07 kg/m²   Body mass index is 30.07 kg/m².     Physical Exam  GENERAL:   General Appearance:  well-developed, well-nourished, no functional handicap, well-groomed.  Hygiene:  good.  Ill-appearance:  none.    HEENT: NC/AT head.  Neck is supple.  Speech:  clear.  Eye contact: Reduced, sluggish.  DERMATOLOGY:  Skin:  normal.   NEUROLOGICAL: Orientation:  alert and oriented x 3.   PSYCHOLOGY: Affect:  she seems lethargic. Difficult time holding eye lids open. Otherwise cooperative, trouble concentrating.  Mood:  pleasant.     Previous/current LABS reviewed: Yes  Previous/current RADIOLOGY reviewed: Yes    20 Minutes spent with the patient with greater than 50% of the time used for counseling and coordination of care.    Note: This dictation was generated using Dragon voice recognition software. Please excuse any grammatical or spelling errors that may have occurred using the system.

## 2024-11-15 ENCOUNTER — APPOINTMENT (OUTPATIENT)
Dept: CARDIOLOGY | Facility: HOSPITAL | Age: 29
End: 2024-11-15
Payer: COMMERCIAL

## 2024-11-15 ENCOUNTER — HOSPITAL ENCOUNTER (EMERGENCY)
Facility: HOSPITAL | Age: 29
Discharge: HOME | End: 2024-11-15
Payer: COMMERCIAL

## 2024-11-15 VITALS
HEART RATE: 67 BPM | RESPIRATION RATE: 19 BRPM | DIASTOLIC BLOOD PRESSURE: 63 MMHG | HEIGHT: 67 IN | BODY MASS INDEX: 29.82 KG/M2 | WEIGHT: 190 LBS | SYSTOLIC BLOOD PRESSURE: 151 MMHG | OXYGEN SATURATION: 100 % | TEMPERATURE: 97.3 F

## 2024-11-15 DIAGNOSIS — R11.2 NAUSEA AND VOMITING, UNSPECIFIED VOMITING TYPE: Primary | ICD-10-CM

## 2024-11-15 LAB
ALBUMIN SERPL BCP-MCNC: 4.7 G/DL (ref 3.4–5)
ALP SERPL-CCNC: 53 U/L (ref 33–110)
ALT SERPL W P-5'-P-CCNC: 16 U/L (ref 7–45)
ANION GAP SERPL CALCULATED.3IONS-SCNC: 17 MMOL/L (ref 10–20)
AST SERPL W P-5'-P-CCNC: 17 U/L (ref 9–39)
BASOPHILS # BLD AUTO: 0.06 X10*3/UL (ref 0–0.1)
BASOPHILS NFR BLD AUTO: 0.6 %
BILIRUB SERPL-MCNC: 0.6 MG/DL (ref 0–1.2)
BUN SERPL-MCNC: 10 MG/DL (ref 6–23)
CALCIUM SERPL-MCNC: 9.7 MG/DL (ref 8.6–10.3)
CHLORIDE SERPL-SCNC: 105 MMOL/L (ref 98–107)
CO2 SERPL-SCNC: 18 MMOL/L (ref 21–32)
CREAT SERPL-MCNC: 0.59 MG/DL (ref 0.5–1.05)
EGFRCR SERPLBLD CKD-EPI 2021: >90 ML/MIN/1.73M*2
EOSINOPHIL # BLD AUTO: 0.25 X10*3/UL (ref 0–0.7)
EOSINOPHIL NFR BLD AUTO: 2.4 %
ERYTHROCYTE [DISTWIDTH] IN BLOOD BY AUTOMATED COUNT: 12.1 % (ref 11.5–14.5)
GLUCOSE SERPL-MCNC: 98 MG/DL (ref 74–99)
HCT VFR BLD AUTO: 42.9 % (ref 36–46)
HGB BLD-MCNC: 14.6 G/DL (ref 12–16)
IMM GRANULOCYTES # BLD AUTO: 0.03 X10*3/UL (ref 0–0.7)
IMM GRANULOCYTES NFR BLD AUTO: 0.3 % (ref 0–0.9)
LIPASE SERPL-CCNC: 16 U/L (ref 9–82)
LYMPHOCYTES # BLD AUTO: 2.72 X10*3/UL (ref 1.2–4.8)
LYMPHOCYTES NFR BLD AUTO: 26.1 %
MCH RBC QN AUTO: 30.1 PG (ref 26–34)
MCHC RBC AUTO-ENTMCNC: 34 G/DL (ref 32–36)
MCV RBC AUTO: 89 FL (ref 80–100)
MONOCYTES # BLD AUTO: 0.79 X10*3/UL (ref 0.1–1)
MONOCYTES NFR BLD AUTO: 7.6 %
NEUTROPHILS # BLD AUTO: 6.59 X10*3/UL (ref 1.2–7.7)
NEUTROPHILS NFR BLD AUTO: 63 %
NRBC BLD-RTO: 0 /100 WBCS (ref 0–0)
PLATELET # BLD AUTO: 377 X10*3/UL (ref 150–450)
POTASSIUM SERPL-SCNC: 3.3 MMOL/L (ref 3.5–5.3)
PROT SERPL-MCNC: 7.7 G/DL (ref 6.4–8.2)
RBC # BLD AUTO: 4.85 X10*6/UL (ref 4–5.2)
SODIUM SERPL-SCNC: 137 MMOL/L (ref 136–145)
WBC # BLD AUTO: 10.4 X10*3/UL (ref 4.4–11.3)

## 2024-11-15 PROCEDURE — 36415 COLL VENOUS BLD VENIPUNCTURE: CPT | Performed by: NURSE PRACTITIONER

## 2024-11-15 PROCEDURE — 99284 EMERGENCY DEPT VISIT MOD MDM: CPT | Mod: 25

## 2024-11-15 PROCEDURE — 96374 THER/PROPH/DIAG INJ IV PUSH: CPT

## 2024-11-15 PROCEDURE — 85025 COMPLETE CBC W/AUTO DIFF WBC: CPT | Performed by: NURSE PRACTITIONER

## 2024-11-15 PROCEDURE — 2500000004 HC RX 250 GENERAL PHARMACY W/ HCPCS (ALT 636 FOR OP/ED)

## 2024-11-15 PROCEDURE — 80053 COMPREHEN METABOLIC PANEL: CPT | Performed by: NURSE PRACTITIONER

## 2024-11-15 PROCEDURE — 83690 ASSAY OF LIPASE: CPT | Performed by: NURSE PRACTITIONER

## 2024-11-15 PROCEDURE — 96375 TX/PRO/DX INJ NEW DRUG ADDON: CPT

## 2024-11-15 PROCEDURE — 2500000004 HC RX 250 GENERAL PHARMACY W/ HCPCS (ALT 636 FOR OP/ED): Performed by: NURSE PRACTITIONER

## 2024-11-15 PROCEDURE — 93005 ELECTROCARDIOGRAM TRACING: CPT

## 2024-11-15 RX ORDER — DROPERIDOL 2.5 MG/ML
0.62 INJECTION, SOLUTION INTRAMUSCULAR; INTRAVENOUS ONCE
Status: DISCONTINUED | OUTPATIENT
Start: 2024-11-15 | End: 2024-11-15

## 2024-11-15 RX ORDER — HALOPERIDOL 5 MG/ML
2 INJECTION INTRAMUSCULAR ONCE
Status: COMPLETED | OUTPATIENT
Start: 2024-11-15 | End: 2024-11-15

## 2024-11-15 RX ORDER — DROPERIDOL 2.5 MG/ML
0.62 INJECTION, SOLUTION INTRAMUSCULAR; INTRAVENOUS ONCE
Status: COMPLETED | OUTPATIENT
Start: 2024-11-15 | End: 2024-11-15

## 2024-11-15 RX ORDER — DIPHENHYDRAMINE HYDROCHLORIDE 50 MG/ML
25 INJECTION INTRAMUSCULAR; INTRAVENOUS ONCE
Status: COMPLETED | OUTPATIENT
Start: 2024-11-15 | End: 2024-11-15

## 2024-11-15 RX ORDER — KETOROLAC TROMETHAMINE 30 MG/ML
15 INJECTION, SOLUTION INTRAMUSCULAR; INTRAVENOUS ONCE
Status: COMPLETED | OUTPATIENT
Start: 2024-11-15 | End: 2024-11-15

## 2024-11-15 RX ORDER — POTASSIUM CHLORIDE 1.5 G/1.58G
20 POWDER, FOR SOLUTION ORAL ONCE
Status: DISCONTINUED | OUTPATIENT
Start: 2024-11-15 | End: 2024-11-15 | Stop reason: HOSPADM

## 2024-11-15 RX ORDER — METOCLOPRAMIDE HYDROCHLORIDE 5 MG/ML
10 INJECTION INTRAMUSCULAR; INTRAVENOUS ONCE
Status: COMPLETED | OUTPATIENT
Start: 2024-11-15 | End: 2024-11-15

## 2024-11-15 RX ORDER — ONDANSETRON 4 MG/1
4 TABLET, FILM COATED ORAL EVERY 6 HOURS PRN
Qty: 15 TABLET | Refills: 0 | Status: SHIPPED | OUTPATIENT
Start: 2024-11-15

## 2024-11-15 RX ORDER — FAMOTIDINE 10 MG/ML
20 INJECTION INTRAVENOUS ONCE
Status: COMPLETED | OUTPATIENT
Start: 2024-11-15 | End: 2024-11-15

## 2024-11-15 ASSESSMENT — PAIN SCALES - GENERAL
PAINLEVEL_OUTOF10: 8
PAINLEVEL_OUTOF10: 6

## 2024-11-15 ASSESSMENT — COLUMBIA-SUICIDE SEVERITY RATING SCALE - C-SSRS
2. HAVE YOU ACTUALLY HAD ANY THOUGHTS OF KILLING YOURSELF?: NO
1. IN THE PAST MONTH, HAVE YOU WISHED YOU WERE DEAD OR WISHED YOU COULD GO TO SLEEP AND NOT WAKE UP?: NO
6. HAVE YOU EVER DONE ANYTHING, STARTED TO DO ANYTHING, OR PREPARED TO DO ANYTHING TO END YOUR LIFE?: NO

## 2024-11-15 ASSESSMENT — PAIN - FUNCTIONAL ASSESSMENT
PAIN_FUNCTIONAL_ASSESSMENT: 0-10
PAIN_FUNCTIONAL_ASSESSMENT: 0-10

## 2024-11-15 ASSESSMENT — PAIN DESCRIPTION - PROGRESSION: CLINICAL_PROGRESSION: GRADUALLY IMPROVING

## 2024-11-15 NOTE — ED PROVIDER NOTES
THIS IS MY RITA SUPERVISORY AND SHARED VISIT NOTE:    I personally saw the patient and made/approved the management plan and take responsibility for the patient management.    History: Patient is a 29-year-old female presents with a chief plaint of vomiting.  Patient states she used synthetic marijuana couple days ago and has been having abdominal pain, nausea vomiting headache and chills since then.  Patient denies any diarrhea or constipation, no dysuria or hematuria, patient has no chest pain or shortness of breath, no dizziness or blurred vision, no other acute complaints at this time.    Exam: GENERAL APPEARANCE: Awake and alert.     HEENT: Normocephalic, atraumatic. Extraocular muscles are intact. Pupils equal round and reactive to light.  Mucous membranes moist  CHEST: Nontender to palpation. Clear to auscultation bilaterally. No rales, rhonchi, or wheezing.   HEART: S1, S2. Regular rate and rhythm. No murmurs, gallops or rubs.  Strong and equal pulses in the extremities.   ABDOMEN: Soft,.  non-tender.  No rebound or guarding, bowel sounds normal x 4 quadrants  NEUROLOGICAL: Awake, alert and oriented x 3.       MDM: Patient seen and evaluated at bedside, patient is in no acute distress.  I will order a CBC, CMP, lipase, EKG, Zofran, Pepcid, Haldol, Benadryl, Reglan. Differential diagnosis includes but is not limited to cannabinoid hyperemesis, viral gastroenteritis, pancreatitis.  Patient's lab work is reassuring, listed below, patient still having nausea, I did give the patient droperidol, this did resolve the patient's symptoms.  Discussed with the patient this could be related to her synthetic marijuana usage, recommended that she is discontinued her usage, return precautions were discussed with patient, she is agreeable this discharge plan.  No  Diagnosis: Nausea and vomiting  Results for orders placed or performed during the hospital encounter of 11/15/24   Comprehensive Metabolic Panel    Collection  Time: 11/15/24  4:38 PM   Result Value Ref Range    Glucose 98 74 - 99 mg/dL    Sodium 137 136 - 145 mmol/L    Potassium 3.3 (L) 3.5 - 5.3 mmol/L    Chloride 105 98 - 107 mmol/L    Bicarbonate 18 (L) 21 - 32 mmol/L    Anion Gap 17 10 - 20 mmol/L    Urea Nitrogen 10 6 - 23 mg/dL    Creatinine 0.59 0.50 - 1.05 mg/dL    eGFR >90 >60 mL/min/1.73m*2    Calcium 9.7 8.6 - 10.3 mg/dL    Albumin 4.7 3.4 - 5.0 g/dL    Alkaline Phosphatase 53 33 - 110 U/L    Total Protein 7.7 6.4 - 8.2 g/dL    AST 17 9 - 39 U/L    Bilirubin, Total 0.6 0.0 - 1.2 mg/dL    ALT 16 7 - 45 U/L   Lipase    Collection Time: 11/15/24  4:38 PM   Result Value Ref Range    Lipase 16 9 - 82 U/L   CBC and Auto Differential    Collection Time: 11/15/24  4:38 PM   Result Value Ref Range    WBC 10.4 4.4 - 11.3 x10*3/uL    nRBC 0.0 0.0 - 0.0 /100 WBCs    RBC 4.85 4.00 - 5.20 x10*6/uL    Hemoglobin 14.6 12.0 - 16.0 g/dL    Hematocrit 42.9 36.0 - 46.0 %    MCV 89 80 - 100 fL    MCH 30.1 26.0 - 34.0 pg    MCHC 34.0 32.0 - 36.0 g/dL    RDW 12.1 11.5 - 14.5 %    Platelets 377 150 - 450 x10*3/uL    Neutrophils % 63.0 40.0 - 80.0 %    Immature Granulocytes %, Automated 0.3 0.0 - 0.9 %    Lymphocytes % 26.1 13.0 - 44.0 %    Monocytes % 7.6 2.0 - 10.0 %    Eosinophils % 2.4 0.0 - 6.0 %    Basophils % 0.6 0.0 - 2.0 %    Neutrophils Absolute 6.59 1.20 - 7.70 x10*3/uL    Immature Granulocytes Absolute, Automated 0.03 0.00 - 0.70 x10*3/uL    Lymphocytes Absolute 2.72 1.20 - 4.80 x10*3/uL    Monocytes Absolute 0.79 0.10 - 1.00 x10*3/uL    Eosinophils Absolute 0.25 0.00 - 0.70 x10*3/uL    Basophils Absolute 0.06 0.00 - 0.10 x10*3/uL     .    Please see RITA note for further details    Sections of this report were created using voice-to-text technology and may contain errors in translation    Edmar Richmond DO  Emergency Medicine       Edmar Richmond DO  11/15/24 2009

## 2024-11-15 NOTE — ED PROVIDER NOTES
HPI   Chief Complaint   Patient presents with    Vomiting     Pt states she took a synthetic marijuana a couple days ago and has been having abd pain, n/v, headache, chills.        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  Social History     Tobacco Use    Smoking status: Every Day     Current packs/day: 0.50     Types: Cigarettes     Passive exposure: Never    Smokeless tobacco: Never   Vaping Use    Vaping status: Every Day    Substances: Nicotine, THC, CBD, Flavoring    Devices: Disposable, Pre-filled or refillable cartridge   Substance Use Topics    Alcohol use: Not Currently     Comment: States used to be a really bad alcoholic    Drug use: Never       Physical Exam   ED Triage Vitals [11/15/24 1620]   Temperature Heart Rate Respirations BP   36.3 °C (97.3 °F) 82 (!) 22 (!) 159/131      Pulse Ox Temp src Heart Rate Source Patient Position   100 % -- -- --      BP Location FiO2 (%)     -- --       Physical Exam    CONSTITUTIONAL: Vital signs reviewed as charted, well-developed and in no distress  Eyes: Extraocular muscles are intact. Pupils equal round and reactive to light. Conjunctiva are pink.    ENT: Mucous membranes are moist. Tongue in the midline. Pharynx was without erythema or exudates, uvula midline  LUNGS: Breath sounds equal and clear to auscultation. Good air exchange, no wheezes rales or retractions, pulse oximetry is charted.  HEART: Regular rate and rhythm without murmur thrill or rub, strong tones, auscultation is normal.  ABDOMEN: Generalized tenderness in the upper abdomen, no rebound or guarding noted.  The abdomen is soft.  Neuro: The patient is awake, alert and oriented ×3. Moving all 4  extremities and answering questions appropriately.   MUSCULOSKELETAL: The calves are nontender to palpation. Full gross active range of motion.   PSYCH: Awake alert oriented, normal mood and affect.  Skin:  Dry, normal color, warm to the touch, no rash present.      ED Course & MDM   ED Course as of 11/15/24 2103   Fri Nov 15, 2024   1850 EKG interpreted by myself independently, EKG shows normal sinus rhythm, rate of 63 bpm, [place] interval 136, QRS 90, QT 46, QTc 497, patient does have a slightly prolonged QT, no ST elevation or depression, negative for acute MI. [PEDRO]      ED Course User Index  [PEDRO] Edmar Richmond,          Diagnoses as of 11/15/24 2103   Nausea and vomiting, unspecified vomiting type                 No data recorded     [place] Coma Scale Score: 15 (11/15/24 1620 : Haseeb Matt, EMT)                           Medical Decision Making  History obtained from: patient    Vital signs, nursing notes, current medications, past medical history, Surgical history, allergies, social history, family History were reviewed.         HPI:  Patient 29-year-old female presenting emergency room today 3-day history nausea vomiting epigastric abdominal pain.  States this all started after she took krantom.  She denies fevers but states she has had chills when she is vomiting.  No diarrhea or constipation.  No urinary complaints.  She does appear to be uncomfortable but is nontoxic vital signs are positive hypertension otherwise unremarkable      10 point ROS was reviewed and negative except Noted above in HPI.  DDX: as listed above          MDM Summary/considerations:  Labs Reviewed   COMPREHENSIVE METABOLIC PANEL - Abnormal       Result Value    Glucose 98      Sodium 137      Potassium 3.3 (*)     Chloride 105      Bicarbonate 18 (*)     Anion Gap 17      Urea Nitrogen 10      Creatinine 0.59      eGFR >90      Calcium 9.7      Albumin 4.7      Alkaline Phosphatase 53      Total Protein 7.7      AST 17       Bilirubin, Total 0.6      ALT 16     LIPASE - Normal    Lipase 16      Narrative:     Venipuncture immediately after or during the administration of Metamizole may lead to falsely low results. Testing should be performed immediately prior to Metamizole dosing.   CBC WITH AUTO DIFFERENTIAL    WBC 10.4      nRBC 0.0      RBC 4.85      Hemoglobin 14.6      Hematocrit 42.9      MCV 89      MCH 30.1      MCHC 34.0      RDW 12.1      Platelets 377      Neutrophils % 63.0      Immature Granulocytes %, Automated 0.3      Lymphocytes % 26.1      Monocytes % 7.6      Eosinophils % 2.4      Basophils % 0.6      Neutrophils Absolute 6.59      Immature Granulocytes Absolute, Automated 0.03      Lymphocytes Absolute 2.72      Monocytes Absolute 0.79      Eosinophils Absolute 0.25      Basophils Absolute 0.06     HCG, URINE, QUALITATIVE   URINALYSIS WITH REFLEX CULTURE AND MICROSCOPIC    Narrative:     The following orders were created for panel order Urinalysis with Reflex Culture and Microscopic.  Procedure                               Abnormality         Status                     ---------                               -----------         ------                     Urinalysis with Reflex C...[[id number]]                                                 Extra Urine Gray Tube[[id number]]                                                         Please view results for these tests on the individual orders.   URINALYSIS WITH REFLEX CULTURE AND MICROSCOPIC   EXTRA URINE GRAY TUBE   DRUG SCREEN,URINE     No orders to display     Medications   potassium chloride (Klor-Con) packet 20 mEq (20 mEq oral Not Given 11/15/24 1836)   ketorolac (Toradol) injection 15 mg (15 mg intravenous Given 11/15/24 1643)   famotidine PF (Pepcid) injection 20 mg (20 mg intravenous Given 11/15/24 1644)   haloperidol lactate (Haldol) injection 2 mg (2 mg intravenous Given 11/15/24 1645)   metoclopramide (Reglan) injection 10 mg (10 mg intravenous Given 11/15/24  1746)   diphenhydrAMINE (BENADryl) injection 25 mg (25 mg intravenous Given 11/15/24 1745)   droperidol (Inapsine) injection 0.625 mg (0.625 mg intravenous Given 11/15/24 1851)     Discharge Medication List as of 11/15/2024  8:16 PM        START taking these medications    Details   ondansetron (Zofran) 4 mg tablet Take 1 tablet (4 mg) by mouth every 6 hours if needed for nausea or vomiting., Starting Fri 11/15/2024, Print                   I estimate there is a low risk for acute appendicitis, bowel extraction, cystitis, diverticulitis, incarcerated hernia, mesenteric ischemia, pancreatitis, or perforated bowel or ulcer, thus I considered the discharge disposition reasonable. There is no evidence of peritonitis, sepsis, or toxicity. I have discussed the diagnosis and risks, and we agreed with discharging home to follow-up with the primary care doctor. We also discussed returning to the emergency department immediately if new or worsening symptoms occur. Symptoms of most concern that we discussed are bloody stool, fever, changing or worsening pain, or vomiting that necessitates immediate return.      Patient able to take p.o. challenge without difficulty after medications here in the ED laboratory assessment grossly unremarkable.  Patient was discharged home in stable condition.  Will follow with PCP for reevaluation.    All of the patient's questions were answered to the best of my ability.  Patient states understanding that they have been screened for an emergency today and we have not found any etiology of symptoms that requires emergent treatment or admission to the hospital at this point. They understand that they have not had definitive care day and require follow-up for treatment of their condition. They also state understanding that they may have an emergent condition that may potentially have not of detected at this visit and they must return to the emergency department if they develop any worsening of  symptoms or new complaints.      I have evaluated this patient, my supervising physician was available for consultation.          Critical Care: Not warranted at this time        This chart was completed using voice recognition transcription software. Please excuse any errors of transcription including grammatical, punctuation, syntax and spelling errors.  Please contact me with any questions regarding this chart.      Procedure  Procedures     Ino Patel, MERLINE-FRANDY  11/15/24 9034

## 2024-11-18 ENCOUNTER — LAB (OUTPATIENT)
Dept: LAB | Facility: LAB | Age: 29
End: 2024-11-18
Payer: COMMERCIAL

## 2024-11-18 DIAGNOSIS — R55 NEAR SYNCOPE: ICD-10-CM

## 2024-11-18 DIAGNOSIS — Z31.69 ENCOUNTER FOR PRECONCEPTION CONSULTATION: ICD-10-CM

## 2024-11-18 LAB
B-HCG SERPL-ACNC: <2 MIU/ML
BASOPHILS # BLD AUTO: 0.05 X10*3/UL (ref 0–0.1)
BASOPHILS NFR BLD AUTO: 0.7 %
DHEA-S SERPL-MCNC: 222 UG/DL (ref 12–379)
EOSINOPHIL # BLD AUTO: 0.22 X10*3/UL (ref 0–0.7)
EOSINOPHIL NFR BLD AUTO: 2.9 %
ERYTHROCYTE [DISTWIDTH] IN BLOOD BY AUTOMATED COUNT: 12.1 % (ref 11.5–14.5)
ESTRADIOL SERPL-MCNC: 72 PG/ML
FSH SERPL-ACNC: 6 IU/L
HCT VFR BLD AUTO: 41 % (ref 36–46)
HGB BLD-MCNC: 13.5 G/DL (ref 12–16)
IMM GRANULOCYTES # BLD AUTO: 0.01 X10*3/UL (ref 0–0.7)
IMM GRANULOCYTES NFR BLD AUTO: 0.1 % (ref 0–0.9)
LH SERPL-ACNC: 5.7 IU/L
LYMPHOCYTES # BLD AUTO: 2.61 X10*3/UL (ref 1.2–4.8)
LYMPHOCYTES NFR BLD AUTO: 34.3 %
MCH RBC QN AUTO: 30.1 PG (ref 26–34)
MCHC RBC AUTO-ENTMCNC: 32.9 G/DL (ref 32–36)
MCV RBC AUTO: 91 FL (ref 80–100)
MONOCYTES # BLD AUTO: 0.76 X10*3/UL (ref 0.1–1)
MONOCYTES NFR BLD AUTO: 10 %
NEUTROPHILS # BLD AUTO: 3.97 X10*3/UL (ref 1.2–7.7)
NEUTROPHILS NFR BLD AUTO: 52 %
NRBC BLD-RTO: 0 /100 WBCS (ref 0–0)
PLATELET # BLD AUTO: 335 X10*3/UL (ref 150–450)
PROGEST SERPL-MCNC: 0.4 NG/ML
RBC # BLD AUTO: 4.49 X10*6/UL (ref 4–5.2)
T4 FREE SERPL-MCNC: 0.91 NG/DL (ref 0.61–1.12)
TSH SERPL-ACNC: 0.53 MIU/L (ref 0.44–3.98)
WBC # BLD AUTO: 7.6 X10*3/UL (ref 4.4–11.3)

## 2024-11-18 PROCEDURE — 82627 DEHYDROEPIANDROSTERONE: CPT

## 2024-11-18 PROCEDURE — 84402 ASSAY OF FREE TESTOSTERONE: CPT

## 2024-11-18 PROCEDURE — 83002 ASSAY OF GONADOTROPIN (LH): CPT

## 2024-11-18 PROCEDURE — 84144 ASSAY OF PROGESTERONE: CPT

## 2024-11-18 PROCEDURE — 82670 ASSAY OF TOTAL ESTRADIOL: CPT

## 2024-11-18 PROCEDURE — 83001 ASSAY OF GONADOTROPIN (FSH): CPT

## 2024-11-18 PROCEDURE — 82626 DEHYDROEPIANDROSTERONE: CPT

## 2024-11-18 PROCEDURE — 36415 COLL VENOUS BLD VENIPUNCTURE: CPT

## 2024-11-19 LAB
ATRIAL RATE: 63 BPM
P AXIS: 0 DEGREES
P OFFSET: 203 MS
P ONSET: 150 MS
PR INTERVAL: 136 MS
Q ONSET: 218 MS
QRS COUNT: 10 BEATS
QRS DURATION: 90 MS
QT INTERVAL: 486 MS
QTC CALCULATION(BAZETT): 497 MS
QTC FREDERICIA: 494 MS
R AXIS: 71 DEGREES
T AXIS: 73 DEGREES
T OFFSET: 461 MS
VENTRICULAR RATE: 63 BPM

## 2024-11-21 LAB — DHEA SERPL-MCNC: 3.31 NG/ML (ref 1.33–7.78)

## 2024-11-23 LAB
TESTOSTERONE FREE (CHAN): 4 PG/ML (ref 0.1–6.4)
TESTOSTERONE,TOTAL,LC-MS/MS: 29 NG/DL (ref 2–45)

## 2024-11-27 DIAGNOSIS — Z31.9 PATIENT DESIRES PREGNANCY: ICD-10-CM

## 2024-11-27 DIAGNOSIS — N97.1: ICD-10-CM

## 2024-11-27 DIAGNOSIS — Q51.28 SEPTATE UTERUS: ICD-10-CM

## 2024-11-27 DIAGNOSIS — R93.89 ABNORMAL ULTRASOUND OF PELVIS: Primary | ICD-10-CM

## 2024-12-03 ENCOUNTER — TELEMEDICINE (OUTPATIENT)
Dept: ENDOCRINOLOGY | Facility: CLINIC | Age: 29
End: 2024-12-03
Payer: COMMERCIAL

## 2024-12-03 VITALS — WEIGHT: 190 LBS | HEIGHT: 67 IN | BODY MASS INDEX: 29.82 KG/M2

## 2024-12-03 DIAGNOSIS — Z86.19 HX OF CHLAMYDIA INFECTION: Primary | ICD-10-CM

## 2024-12-03 DIAGNOSIS — Z31.9 PATIENT DESIRES PREGNANCY: ICD-10-CM

## 2024-12-03 DIAGNOSIS — Q51.28 SEPTATE UTERUS: ICD-10-CM

## 2024-12-03 DIAGNOSIS — R93.89 ABNORMAL ULTRASOUND OF PELVIS: Primary | ICD-10-CM

## 2024-12-03 DIAGNOSIS — N97.1: ICD-10-CM

## 2024-12-03 PROCEDURE — 99202 OFFICE O/P NEW SF 15 MIN: CPT | Performed by: NURSE PRACTITIONER

## 2024-12-03 PROCEDURE — 3008F BODY MASS INDEX DOCD: CPT | Performed by: NURSE PRACTITIONER

## 2024-12-03 RX ORDER — DOXYCYCLINE 100 MG/1
CAPSULE ORAL
Qty: 10 CAPSULE | Refills: 0 | Status: SHIPPED | OUTPATIENT
Start: 2024-12-03

## 2024-12-03 ASSESSMENT — PATIENT HEALTH QUESTIONNAIRE - PHQ9
4. FEELING TIRED OR HAVING LITTLE ENERGY: MORE THAN HALF THE DAYS
8. MOVING OR SPEAKING SO SLOWLY THAT OTHER PEOPLE COULD HAVE NOTICED. OR THE OPPOSITE, BEING SO FIGETY OR RESTLESS THAT YOU HAVE BEEN MOVING AROUND A LOT MORE THAN USUAL: NOT AT ALL
SUM OF ALL RESPONSES TO PHQ9 QUESTIONS 1 AND 2: 4
9. THOUGHTS THAT YOU WOULD BE BETTER OFF DEAD, OR OF HURTING YOURSELF: NOT AT ALL
10. IF YOU CHECKED OFF ANY PROBLEMS, HOW DIFFICULT HAVE THESE PROBLEMS MADE IT FOR YOU TO DO YOUR WORK, TAKE CARE OF THINGS AT HOME, OR GET ALONG WITH OTHER PEOPLE: SOMEWHAT DIFFICULT
3. TROUBLE FALLING OR STAYING ASLEEP OR SLEEPING TOO MUCH: NOT AT ALL
5. POOR APPETITE OR OVEREATING: NOT AT ALL
SUM OF ALL RESPONSES TO PHQ QUESTIONS 1-9: 12
2. FEELING DOWN, DEPRESSED OR HOPELESS: MORE THAN HALF THE DAYS
6. FEELING BAD ABOUT YOURSELF - OR THAT YOU ARE A FAILURE OR HAVE LET YOURSELF OR YOUR FAMILY DOWN: NEARLY EVERY DAY
7. TROUBLE CONCENTRATING ON THINGS, SUCH AS READING THE NEWSPAPER OR WATCHING TELEVISION: NEARLY EVERY DAY
1. LITTLE INTEREST OR PLEASURE IN DOING THINGS: MORE THAN HALF THE DAYS

## 2024-12-03 ASSESSMENT — PAIN SCALES - GENERAL: PAINLEVEL_OUTOF10: 0-NO PAIN

## 2024-12-03 NOTE — PROGRESS NOTES
Virtual Visit     NEW FERTILITY PATIENT Problem Focused VISIT (Pre Hysterosalpingogram)      Referred by: KITTY Hwang This is the physician that placed the order and results will go to this physician.     Accompanied today by:  self     Patient is a 29  y.o.  female who presents with trying to conceive x 1 year.     Allergies to contrast media: None    Allergies to Betadine: None  Had a bubble test and could see air out of 1 of the tubes.     Previous HSG: No       OB Hx: G0        GYN HISTORY:    History of STD or PID: Yes when 14 had CT  Last pap smear: Hx of LEEP in late 's.     MENSTRUAL HISTORY:  LMP: q 26-29 days    Cycles: typically q 28 days    PMH: Hx of abnormal pap, remission from alcoholism    PSH: Liposuction, LEEP, tonsillectomy    Current Meds: PNV, albuterol    Clotting or Bleeding D/O: No    Allergies: None     BMI:  29.67    VITALS:   None done since virtual visit    ASSESSMENT   Patient is a  29 y.o. female  with h/o who presents to review history and risks of HSG procedure that OBGYN ordered.      COUNSELING  Hysterosalpingogram discussed with pt. Diagnostic procedure done in radiology to determine tubal patency and/or uterine anomalies. Schedule with our front office ([phone]) between cycle day 6-11. Take 800 mg Ibuprofen 60 minutes prior to scheduled procedure to help with uterine cramping. Complications from HSG are rare but can include allergic reaction to contrast media, pelvic infection, and/or vaginal bleeding. PI sheet provided to pt in office today.      PLAN   Call cd 1 to schedule HSG.   Take Motrin 1 Hour prior to test.  Start doxycycline 100mg twice a day starting 1 day prior to the test.     FOLLOW UP   Pt has HSG scheduled for , no increased risks identified. Pt denies allergies to betadine, contrast media, or other drug allergies. Pt denies hx of STI or chronic salpingitis. Pt aware to arrive 15 minutes early and present to [facility] office first for urine  pregnancy test. Advised pt can take tylenol or ibuprofen OTC 1 hours prior to procedure.   Results will be sent or ordering physician. Pt aware to follow up with ordering physician.

## 2024-12-03 NOTE — PROGRESS NOTES
Patient scheduling HST.  Consulted with Melodie Esparza CNP virtually.  Patient's partner completed a home semen analysis which returned normal.  They are wanting an official test.  Patient inquiring about treatment options for her findings on ultrasound.  Placed official referral for [facility] consultation following her HSG.

## 2024-12-17 DIAGNOSIS — Z01.812 ENCOUNTER FOR PREPROCEDURAL LABORATORY EXAMINATION: Primary | ICD-10-CM

## 2024-12-19 ENCOUNTER — ANCILLARY PROCEDURE (OUTPATIENT)
Dept: ENDOCRINOLOGY | Facility: CLINIC | Age: 29
End: 2024-12-19
Payer: COMMERCIAL

## 2024-12-19 ENCOUNTER — HOSPITAL ENCOUNTER (OUTPATIENT)
Dept: RADIOLOGY | Facility: HOSPITAL | Age: 29
Discharge: HOME | End: 2024-12-19
Payer: COMMERCIAL

## 2024-12-19 DIAGNOSIS — Z31.9 PATIENT DESIRES PREGNANCY: ICD-10-CM

## 2024-12-19 DIAGNOSIS — R93.89 ABNORMAL ULTRASOUND OF PELVIS: ICD-10-CM

## 2024-12-19 DIAGNOSIS — N97.1: ICD-10-CM

## 2024-12-19 DIAGNOSIS — Q51.28 SEPTATE UTERUS: ICD-10-CM

## 2024-12-19 DIAGNOSIS — Z01.812 ENCOUNTER FOR PREPROCEDURAL LABORATORY EXAMINATION: ICD-10-CM

## 2024-12-19 LAB — PREGNANCY TEST URINE, POC: NEGATIVE

## 2024-12-19 PROCEDURE — 2550000001 HC RX 255 CONTRASTS: Performed by: OBSTETRICS & GYNECOLOGY

## 2024-12-19 PROCEDURE — 74740 X-RAY FEMALE GENITAL TRACT: CPT

## 2024-12-19 NOTE — PROGRESS NOTES
Patient presents for an HSG, POCT UPT negative today, see [facility] Radiology schedule for notation. Sierra De Jesus CNP 12/19/24 7:48 AM

## 2024-12-19 NOTE — PROCEDURES
Hysterosalpingogram (HSG)    Date/Time: 12/19/2024 12:26 PM    Performed by: SAVI Noble  Authorized by: SAVI Noble    Consent:     Consent obtained:  Verbal and written    Consent given by:  Patient    Risks, benefits, and alternatives were discussed: yes      Risks discussed:  Bleeding, infection and pain  Universal protocol:     Procedure explained and questions answered to patient or proxy's satisfaction: yes      Relevant documents present and verified: yes      Test results available: yes      Imaging studies available: yes      Required blood products, implants, devices, and special equipment available: yes      Immediately prior to procedure, a time out was called: yes      Patient identity confirmed:  Verbally with patient, hospital-assigned identification number and arm band  Indications:     Indications:  Fertility testing  Pre-procedure details:     Skin preparation:  Povidone-iodine  Sedation:     Sedation type:  None  Anesthesia:     Anesthesia method:  None  Procedure specific details:      ILANA De Jesus CNP performed this procedure      Hysterosalpingogram (HSG) risks, benefits, alternatives, and personnel discussed with patient who agreed to proceed.    Procedural time out        Done in room where procedure done: Yes        Done just before starting procedure: Yes                                                 All members of procedural team involved in time-out: Yes                  Active communication used: Yes                                                         All team members agreed on procedure: Yes                                        Patient correctly identified by two identifiers: Yes                                  Correct side and site identified: Yes                                                     All needed special equipment/instruments available: Yes               Prior to the start of the procedure a time out was taken and the following were verified:  The identity of the patient using two patient identifiers.   Urine pregnancy test was performed and was negative.   Risks, benefits, and alternatives of the procedure were explained to the patient.  Informed consent was obtained.     The patient was placed in the dorsal lithotomy position and a sterile speculum was placed in the vagina. The cervix was sterilized with Betadine x 3. The anterior lip of the cervix was grasped with a single-tooth tenaculum. The acorn cannula was then placed in the cervix. The patient was positioned and images were taken with fluoroscopy as dye was inserted through the cannula. All instruments were then removed. The patient tolerated the procedure well and was discharged home the same day without complications.    Uterus:  marked arcuate uterine contour without filling defects  Tubes:  left tubal patency with free spill of dye, normal tubal architecture noted, and no loculations present. Right tube with loculations mid to distal with small spill seen  Based on these findings, my recommendation is: Follow up required, chart forwarded to primary MD. Dr. Mahoney reviewed the images and agrees with the above findings. Patient is an outside HSG patient, patient to reach back to Dr. Brizuela who is the ordering provider for next steps.     The patient was counseled regarding the above findings and images were reviewed with patient at the time of the exam.     Sierra De Jesus CNP 12/19/24 12:30 PM       Post-procedure details:     Procedure completion:  Tolerated well, no immediate complications

## 2024-12-27 ENCOUNTER — TELEPHONE (OUTPATIENT)
Dept: OBSTETRICS AND GYNECOLOGY | Facility: CLINIC | Age: 29
End: 2024-12-27
Payer: COMMERCIAL

## 2024-12-27 NOTE — TELEPHONE ENCOUNTER
Est pt last seen 11/11/2024 left a voice message stating she has had fertility testing done and questioned her next steps?    Hysterosalpingogram completed on 12/19/2024    Message sent to Dr. Brizuela

## 2024-12-31 ENCOUNTER — TELEMEDICINE (OUTPATIENT)
Dept: OBSTETRICS AND GYNECOLOGY | Facility: CLINIC | Age: 29
End: 2024-12-31
Payer: COMMERCIAL

## 2024-12-31 DIAGNOSIS — Q51.810 ARCUATE UTERUS: ICD-10-CM

## 2024-12-31 DIAGNOSIS — Z31.9 PATIENT DESIRES PREGNANCY: ICD-10-CM

## 2024-12-31 DIAGNOSIS — Z98.890 HISTORY OF LOOP ELECTROSURGICAL EXCISION PROCEDURE (LEEP) OF CERVIX: ICD-10-CM

## 2024-12-31 DIAGNOSIS — Z31.69 ENCOUNTER FOR PRECONCEPTION CONSULTATION: Primary | ICD-10-CM

## 2024-12-31 DIAGNOSIS — R93.89 ABNORMAL ULTRASOUND: ICD-10-CM

## 2024-12-31 PROCEDURE — 99213 OFFICE O/P EST LOW 20 MIN: CPT | Performed by: OBSTETRICS & GYNECOLOGY

## 2024-12-31 NOTE — PROGRESS NOTES
Consent:  Verbal consent was requested and obtained from patient on this date for a telehealth visit to determine if a office visit would be necessary for the patient's complaint(s).  Audio was performed due to the unavailability of video technology for the patient to participate face-to-face.    Referring Physician: No referring provider defined for this encounter.  Primary Care Provider: GENERIC EXTERNAL DATA PROVIDER     Subjective    HPI:  30 yo CF >1 year of trying for pregnancy. G0. Completed saline sonogram with Dr. Capone - findings necessitated HSG which has been completed at . Dr. Mahoney reviewed the study.   Patient has positive ovulation tests. Normal cycling and ovulation btw 14-16 monthly. Normal flow, mild cramping. Q26-28 days.    Past Medical History:   Diagnosis Date    Cervical dysplasia     Recovering alcoholic in remission (Multi)         Past Surgical History:   Procedure Laterality Date    CERVICAL BIOPSY  W/ LOOP ELECTRODE EXCISION      COSMETIC SURGERY  2022    BBL & lipo    TONSILLECTOMY          Social History     Tobacco Use    Smoking status: Every Day     Current packs/day: 0.50     Types: Cigarettes     Passive exposure: Never    Smokeless tobacco: Never   Vaping Use    Vaping status: Every Day    Substances: Nicotine, THC, CBD, Flavoring    Devices: Disposable, Pre-filled or refillable cartridge   Substance Use Topics    Alcohol use: Not Currently     Comment: States used to be a really bad alcoholic    Drug use: Yes     Types: Marijuana        Current Outpatient Medications   Medication Instructions    albuterol (Ventolin HFA) 90 mcg/actuation inhaler 2 puffs, inhalation, Every 4 hours PRN    amphetamine-dextroamphetamine XR (Adderall XR) 15 mg 24 hr capsule 15 mg, Every morning    ARIPiprazole (ABILIFY) 5 mg, Daily    benzocaine (Orajel) 10 % mucosal gel Mouth/Throat, 4 times daily PRN    citalopram (CELEXA) 40 mg, Daily    doxycycline (Vibramycin) 100 mg capsule Take 1 tab by  mouth bid starting 1 day prior to HSG. Take with at least 8 ounces (large glass) of water, do not lie down for 30 minutes after    ketorolac (TORADOL) 10 mg, oral, Every 6 hours PRN    omeprazole (PRILOSEC) 40 mg, oral, Daily before breakfast, Do not crush or chew.    ondansetron (ZOFRAN) 4 mg, oral, Every 6 hours PRN    prenatal 93-iron-folate 9-dha 31 mg iron- 1 mg-200 mg capsule 1 capsule, oral, Daily      Objective    BSA: There is no height or weight on file to calculate BSA.  There were no vitals taken for this visit.     Physical Exam  General: AAOx3 in NAD   Psych: mood and affect are appropiate. Able to consent.     Reviewed her HSG results. Bilateral spillage. +Arcuate uterus confirmed.  Some right tubal loculations.    Assessment/Plan       Diagnoses and all orders for this visit:  Encounter for preconception consultation  -     Referral to Reproductive Endocrinology; Future  Patient desires pregnancy  -     Referral to Reproductive Endocrinology; Future  Arcuate uterus  -     Referral to Reproductive Endocrinology; Future  History of loop electrosurgical excision procedure (LEEP) of cervix  -     Referral to Reproductive Endocrinology; Future  Abnormal ultrasound  -     Referral to Reproductive Endocrinology; Future       Treatment Plans    Appears to be cycling normally with positive ovulation kits.  Patient elects referral to [facility] - interested in IUI and partner wants additional semen analysis.  Placed order today during televisit.  Continue PNV daily.      All new and/or current medications discussed and reviewed including side effects with patient/caregiver, Understanding:  Caregiver/Patient expressed understanding., Adherence:  Barriers to adherence identified and discussed if present.

## 2025-01-10 DIAGNOSIS — K21.00 GASTROESOPHAGEAL REFLUX DISEASE WITH ESOPHAGITIS, UNSPECIFIED WHETHER HEMORRHAGE: ICD-10-CM

## 2025-01-10 RX ORDER — OMEPRAZOLE 40 MG/1
40 CAPSULE, DELAYED RELEASE ORAL
Qty: 30 CAPSULE | Refills: 0 | Status: SHIPPED | OUTPATIENT
Start: 2025-01-10

## 2025-02-06 ENCOUNTER — APPOINTMENT (OUTPATIENT)
Dept: RADIOLOGY | Facility: HOSPITAL | Age: 30
End: 2025-02-06
Payer: COMMERCIAL

## 2025-02-06 ENCOUNTER — APPOINTMENT (OUTPATIENT)
Dept: CARDIOLOGY | Facility: HOSPITAL | Age: 30
End: 2025-02-06
Payer: COMMERCIAL

## 2025-02-06 ENCOUNTER — HOSPITAL ENCOUNTER (EMERGENCY)
Facility: HOSPITAL | Age: 30
Discharge: HOME | End: 2025-02-06
Payer: COMMERCIAL

## 2025-02-06 VITALS
BODY MASS INDEX: 28.25 KG/M2 | SYSTOLIC BLOOD PRESSURE: 131 MMHG | HEIGHT: 67 IN | TEMPERATURE: 96.8 F | DIASTOLIC BLOOD PRESSURE: 96 MMHG | OXYGEN SATURATION: 99 % | RESPIRATION RATE: 17 BRPM | WEIGHT: 180 LBS | HEART RATE: 98 BPM

## 2025-02-06 DIAGNOSIS — K44.9 HIATAL HERNIA: ICD-10-CM

## 2025-02-06 DIAGNOSIS — F12.90 MARIJUANA USE: ICD-10-CM

## 2025-02-06 DIAGNOSIS — R11.2 NAUSEA AND VOMITING, UNSPECIFIED VOMITING TYPE: ICD-10-CM

## 2025-02-06 DIAGNOSIS — R10.84 GENERALIZED ABDOMINAL PAIN: ICD-10-CM

## 2025-02-06 DIAGNOSIS — Z32.01 POSITIVE PREGNANCY TEST (HHS-HCC): ICD-10-CM

## 2025-02-06 DIAGNOSIS — D72.829 LEUKOCYTOSIS, UNSPECIFIED TYPE: Primary | ICD-10-CM

## 2025-02-06 LAB
ALBUMIN SERPL BCP-MCNC: 4.6 G/DL (ref 3.4–5)
ALP SERPL-CCNC: 57 U/L (ref 33–110)
ALT SERPL W P-5'-P-CCNC: 32 U/L (ref 7–45)
AMPHETAMINES UR QL SCN: ABNORMAL
ANION GAP SERPL CALCULATED.3IONS-SCNC: 15 MMOL/L (ref 10–20)
APAP SERPL-MCNC: <10 UG/ML
APPEARANCE UR: CLEAR
AST SERPL W P-5'-P-CCNC: 15 U/L (ref 9–39)
B-HCG SERPL-ACNC: 28 MIU/ML
BARBITURATES UR QL SCN: ABNORMAL
BASOPHILS # BLD AUTO: 0.08 X10*3/UL (ref 0–0.1)
BASOPHILS NFR BLD AUTO: 0.5 %
BENZODIAZ UR QL SCN: ABNORMAL
BILIRUB SERPL-MCNC: 0.7 MG/DL (ref 0–1.2)
BILIRUB UR STRIP.AUTO-MCNC: NEGATIVE MG/DL
BUN SERPL-MCNC: 11 MG/DL (ref 6–23)
BZE UR QL SCN: ABNORMAL
CALCIUM SERPL-MCNC: 9.4 MG/DL (ref 8.6–10.3)
CANNABINOIDS UR QL SCN: ABNORMAL
CHLORIDE SERPL-SCNC: 108 MMOL/L (ref 98–107)
CO2 SERPL-SCNC: 17 MMOL/L (ref 21–32)
COLOR UR: ABNORMAL
CREAT SERPL-MCNC: 0.59 MG/DL (ref 0.5–1.05)
EGFRCR SERPLBLD CKD-EPI 2021: >90 ML/MIN/1.73M*2
EOSINOPHIL # BLD AUTO: 0.04 X10*3/UL (ref 0–0.7)
EOSINOPHIL NFR BLD AUTO: 0.2 %
ERYTHROCYTE [DISTWIDTH] IN BLOOD BY AUTOMATED COUNT: 12 % (ref 11.5–14.5)
ETHANOL SERPL-MCNC: <10 MG/DL
FENTANYL+NORFENTANYL UR QL SCN: ABNORMAL
FLUAV RNA RESP QL NAA+PROBE: NOT DETECTED
FLUBV RNA RESP QL NAA+PROBE: NOT DETECTED
GLUCOSE SERPL-MCNC: 134 MG/DL (ref 74–99)
GLUCOSE UR STRIP.AUTO-MCNC: NORMAL MG/DL
HCG UR QL IA.RAPID: NEGATIVE
HCT VFR BLD AUTO: 47.5 % (ref 36–46)
HGB BLD-MCNC: 16.2 G/DL (ref 12–16)
IMM GRANULOCYTES # BLD AUTO: 0.08 X10*3/UL (ref 0–0.7)
IMM GRANULOCYTES NFR BLD AUTO: 0.5 % (ref 0–0.9)
KETONES UR STRIP.AUTO-MCNC: ABNORMAL MG/DL
LACTATE SERPL-SCNC: 1.6 MMOL/L (ref 0.4–2)
LEUKOCYTE ESTERASE UR QL STRIP.AUTO: NEGATIVE
LIPASE SERPL-CCNC: 44 U/L (ref 9–82)
LYMPHOCYTES # BLD AUTO: 1.95 X10*3/UL (ref 1.2–4.8)
LYMPHOCYTES NFR BLD AUTO: 11.9 %
MCH RBC QN AUTO: 29.9 PG (ref 26–34)
MCHC RBC AUTO-ENTMCNC: 34.1 G/DL (ref 32–36)
MCV RBC AUTO: 88 FL (ref 80–100)
METHADONE UR QL SCN: ABNORMAL
MONOCYTES # BLD AUTO: 0.66 X10*3/UL (ref 0.1–1)
MONOCYTES NFR BLD AUTO: 4 %
NEUTROPHILS # BLD AUTO: 13.64 X10*3/UL (ref 1.2–7.7)
NEUTROPHILS NFR BLD AUTO: 82.9 %
NITRITE UR QL STRIP.AUTO: NEGATIVE
NRBC BLD-RTO: 0 /100 WBCS (ref 0–0)
OPIATES UR QL SCN: ABNORMAL
OXYCODONE+OXYMORPHONE UR QL SCN: ABNORMAL
PCP UR QL SCN: ABNORMAL
PH UR STRIP.AUTO: 7 [PH]
PLATELET # BLD AUTO: 395 X10*3/UL (ref 150–450)
POTASSIUM SERPL-SCNC: 4.1 MMOL/L (ref 3.5–5.3)
PROT SERPL-MCNC: 7.4 G/DL (ref 6.4–8.2)
PROT UR STRIP.AUTO-MCNC: NEGATIVE MG/DL
RBC # BLD AUTO: 5.41 X10*6/UL (ref 4–5.2)
RBC # UR STRIP.AUTO: NEGATIVE MG/DL
SALICYLATES SERPL-MCNC: <3 MG/DL
SARS-COV-2 RNA RESP QL NAA+PROBE: NOT DETECTED
SODIUM SERPL-SCNC: 136 MMOL/L (ref 136–145)
SP GR UR STRIP.AUTO: 1.02
UROBILINOGEN UR STRIP.AUTO-MCNC: NORMAL MG/DL
WBC # BLD AUTO: 16.5 X10*3/UL (ref 4.4–11.3)

## 2025-02-06 PROCEDURE — 2500000004 HC RX 250 GENERAL PHARMACY W/ HCPCS (ALT 636 FOR OP/ED)

## 2025-02-06 PROCEDURE — 85025 COMPLETE CBC W/AUTO DIFF WBC: CPT | Performed by: CLINICAL NURSE SPECIALIST

## 2025-02-06 PROCEDURE — 84702 CHORIONIC GONADOTROPIN TEST: CPT | Performed by: CLINICAL NURSE SPECIALIST

## 2025-02-06 PROCEDURE — 96374 THER/PROPH/DIAG INJ IV PUSH: CPT | Mod: 59

## 2025-02-06 PROCEDURE — 36415 COLL VENOUS BLD VENIPUNCTURE: CPT | Performed by: CLINICAL NURSE SPECIALIST

## 2025-02-06 PROCEDURE — 81003 URINALYSIS AUTO W/O SCOPE: CPT | Performed by: CLINICAL NURSE SPECIALIST

## 2025-02-06 PROCEDURE — 76856 US EXAM PELVIC COMPLETE: CPT

## 2025-02-06 PROCEDURE — 96361 HYDRATE IV INFUSION ADD-ON: CPT

## 2025-02-06 PROCEDURE — 83605 ASSAY OF LACTIC ACID: CPT | Performed by: CLINICAL NURSE SPECIALIST

## 2025-02-06 PROCEDURE — 2500000001 HC RX 250 WO HCPCS SELF ADMINISTERED DRUGS (ALT 637 FOR MEDICARE OP)

## 2025-02-06 PROCEDURE — 87636 SARSCOV2 & INF A&B AMP PRB: CPT

## 2025-02-06 PROCEDURE — 76830 TRANSVAGINAL US NON-OB: CPT

## 2025-02-06 PROCEDURE — 80320 DRUG SCREEN QUANTALCOHOLS: CPT | Performed by: CLINICAL NURSE SPECIALIST

## 2025-02-06 PROCEDURE — 80053 COMPREHEN METABOLIC PANEL: CPT | Performed by: CLINICAL NURSE SPECIALIST

## 2025-02-06 PROCEDURE — 2500000004 HC RX 250 GENERAL PHARMACY W/ HCPCS (ALT 636 FOR OP/ED): Performed by: CLINICAL NURSE SPECIALIST

## 2025-02-06 PROCEDURE — 83690 ASSAY OF LIPASE: CPT | Performed by: CLINICAL NURSE SPECIALIST

## 2025-02-06 PROCEDURE — 80307 DRUG TEST PRSMV CHEM ANLYZR: CPT | Performed by: CLINICAL NURSE SPECIALIST

## 2025-02-06 PROCEDURE — 99285 EMERGENCY DEPT VISIT HI MDM: CPT | Mod: 25

## 2025-02-06 PROCEDURE — 96375 TX/PRO/DX INJ NEW DRUG ADDON: CPT

## 2025-02-06 PROCEDURE — 81025 URINE PREGNANCY TEST: CPT | Performed by: CLINICAL NURSE SPECIALIST

## 2025-02-06 PROCEDURE — 74177 CT ABD & PELVIS W/CONTRAST: CPT

## 2025-02-06 PROCEDURE — 71045 X-RAY EXAM CHEST 1 VIEW: CPT | Performed by: RADIOLOGY

## 2025-02-06 PROCEDURE — 71045 X-RAY EXAM CHEST 1 VIEW: CPT

## 2025-02-06 PROCEDURE — 76817 TRANSVAGINAL US OBSTETRIC: CPT | Mod: FOREIGN READ | Performed by: RADIOLOGY

## 2025-02-06 PROCEDURE — 74177 CT ABD & PELVIS W/CONTRAST: CPT | Performed by: RADIOLOGY

## 2025-02-06 PROCEDURE — 2550000001 HC RX 255 CONTRASTS

## 2025-02-06 PROCEDURE — 93005 ELECTROCARDIOGRAM TRACING: CPT

## 2025-02-06 RX ORDER — ONDANSETRON 4 MG/1
4 TABLET, ORALLY DISINTEGRATING ORAL ONCE
Status: COMPLETED | OUTPATIENT
Start: 2025-02-06 | End: 2025-02-06

## 2025-02-06 RX ORDER — METOCLOPRAMIDE HYDROCHLORIDE 5 MG/ML
10 INJECTION INTRAMUSCULAR; INTRAVENOUS ONCE
Status: COMPLETED | OUTPATIENT
Start: 2025-02-06 | End: 2025-02-06

## 2025-02-06 RX ORDER — ONDANSETRON 4 MG/1
4 TABLET, ORALLY DISINTEGRATING ORAL EVERY 8 HOURS PRN
Qty: 20 TABLET | Refills: 0 | Status: SHIPPED | OUTPATIENT
Start: 2025-02-06 | End: 2025-02-13

## 2025-02-06 RX ORDER — DIPHENHYDRAMINE HYDROCHLORIDE 50 MG/ML
25 INJECTION INTRAMUSCULAR; INTRAVENOUS ONCE
Status: COMPLETED | OUTPATIENT
Start: 2025-02-06 | End: 2025-02-06

## 2025-02-06 RX ORDER — DROPERIDOL 2.5 MG/ML
0.62 INJECTION, SOLUTION INTRAMUSCULAR; INTRAVENOUS ONCE
Status: COMPLETED | OUTPATIENT
Start: 2025-02-06 | End: 2025-02-06

## 2025-02-06 RX ORDER — ACETAMINOPHEN 325 MG/1
975 TABLET ORAL ONCE
Status: COMPLETED | OUTPATIENT
Start: 2025-02-06 | End: 2025-02-06

## 2025-02-06 RX ORDER — HALOPERIDOL 5 MG/ML
2 INJECTION INTRAMUSCULAR ONCE
Status: COMPLETED | OUTPATIENT
Start: 2025-02-06 | End: 2025-02-06

## 2025-02-06 RX ORDER — ONDANSETRON HYDROCHLORIDE 2 MG/ML
4 INJECTION, SOLUTION INTRAVENOUS ONCE
Status: COMPLETED | OUTPATIENT
Start: 2025-02-06 | End: 2025-02-06

## 2025-02-06 RX ORDER — TRAZODONE HYDROCHLORIDE 100 MG/1
100 TABLET ORAL NIGHTLY
COMMUNITY

## 2025-02-06 RX ADMIN — METOCLOPRAMIDE 10 MG: 5 INJECTION, SOLUTION INTRAMUSCULAR; INTRAVENOUS at 10:25

## 2025-02-06 RX ADMIN — ACETAMINOPHEN 975 MG: 325 TABLET ORAL at 20:10

## 2025-02-06 RX ADMIN — HALOPERIDOL LACTATE 2 MG: 5 INJECTION, SOLUTION INTRAMUSCULAR at 15:20

## 2025-02-06 RX ADMIN — IOHEXOL 75 ML: 350 INJECTION, SOLUTION INTRAVENOUS at 15:03

## 2025-02-06 RX ADMIN — ONDANSETRON 4 MG: 2 INJECTION INTRAMUSCULAR; INTRAVENOUS at 12:14

## 2025-02-06 RX ADMIN — DROPERIDOL 0.62 MG: 2.5 INJECTION, SOLUTION INTRAMUSCULAR; INTRAVENOUS at 13:43

## 2025-02-06 RX ADMIN — DIPHENHYDRAMINE HYDROCHLORIDE 25 MG: 50 INJECTION, SOLUTION INTRAMUSCULAR; INTRAVENOUS at 10:25

## 2025-02-06 RX ADMIN — ONDANSETRON 4 MG: 4 TABLET, ORALLY DISINTEGRATING ORAL at 10:24

## 2025-02-06 RX ADMIN — SODIUM CHLORIDE 1000 ML: 900 INJECTION, SOLUTION INTRAVENOUS at 10:28

## 2025-02-06 ASSESSMENT — PAIN SCALES - GENERAL
PAINLEVEL_OUTOF10: 4
PAINLEVEL_OUTOF10: 4

## 2025-02-06 ASSESSMENT — LIFESTYLE VARIABLES
TOTAL SCORE: 0
EVER HAD A DRINK FIRST THING IN THE MORNING TO STEADY YOUR NERVES TO GET RID OF A HANGOVER: NO
HAVE PEOPLE ANNOYED YOU BY CRITICIZING YOUR DRINKING: NO
HAVE YOU EVER FELT YOU SHOULD CUT DOWN ON YOUR DRINKING: NO
EVER FELT BAD OR GUILTY ABOUT YOUR DRINKING: NO

## 2025-02-06 ASSESSMENT — PAIN - FUNCTIONAL ASSESSMENT: PAIN_FUNCTIONAL_ASSESSMENT: 0-10

## 2025-02-06 NOTE — ED PROVIDER NOTES
Department of Emergency Medicine   ED  Provider Note  Admit Date/RoomTime: 2/6/2025  9:53 AM  ED Room: [facility]        History of Present Illness:  Chief Complaint   Patient presents with    Flu Symptoms     Patient arrives with nausea and vomiting, unable to hold anything down. Has started this morning. Just got out of [employer] on Saturday. States she has not taken anything drug maki         Lizet Storm is a 29 y.o. female nausea vomiting onset of symptoms this morning history of marijuana .  Patient states she started with nausea vomiting vomited everything she ate last night for dinner.  Reports she felt fine until yesterday.  She continues to smoke marijuana but has not had any marijuana use today or yesterday.  Complains of chills no fever no itchy watery eyes runny nose complains of cough and congestion.  No wheezing or shortness of breath.  Denies diarrhea.  No abdominal pain reports that her stomach was upset.  She has not taken anything for pain or nausea.  Denies pressure burning or frequency with urination.  Possibility of pregnancy.  No rashes or sores.  Denies shortness of breath no chest pain last menstrual cycle January 10.  review of Systems:   Pertinent positives and negatives are stated within HPI, all other systems reviewed and are negative.        --------------------------------------------- PAST HISTORY ---------------------------------------------  Past Medical History:  has a past medical history of Cervical dysplasia and Recovering alcoholic in remission (Multi).  Past Surgical History:  has a past surgical history that includes Cosmetic surgery (2022); Cervical biopsy w/ loop electrode excision; and Tonsillectomy.  Social History:  reports that she has been smoking cigarettes. She has never been exposed to tobacco smoke. She has never used smokeless tobacco. She reports that she does not currently use alcohol. She reports current drug use. Drug: Marijuana.  Family History: family  "history is not on file.. Unless otherwise noted, family history is non contributory  The patient’s home medications have been reviewed.  Allergies: Patient has no known allergies.        ---------------------------------------------------PHYSICAL EXAM--------------------------------------    GENERAL APPEARANCE: Awake and alert.   Psych: Poor eye contact restless  VITAL SIGNS: As per the nurses' triage record.   HEENT: Normocephalic, atraumatic. Extraocular muscles are intact. Pupils equal round and reactive to light. Conjunctiva are pink. Negative scleral icterus. Mucous membranes are dry.  Tongue in the midline. Pharynx was without erythema or exudates, uvula midline  NECK: Soft Nontender and supple, full gross ROM, no meningeal signs.  CHEST: Nontender to palpation. Clear to auscultation bilaterally. No rales, rhonchi, or wheezing.   HEART: S1, S2. Regular rate and rhythm. No murmurs, gallops or rubs.  Strong and equal pulses in the extremities.   ABDOMEN: Soft, nontender, nondistended, positive bowel sounds, no palpable masses.  MUSCULCSKELETAL: Full gross active range of motion. Ambulating on own with no acute difficulties  NEUROLOGICAL: Awake, alert and oriented x 3. Power intact in the upper and lower extremities. Sensation is intact to light touch in the upper and lower extremities.   IMMUNOLOGICAL: No lymphatic streaking noted   DERM: No petechiae, rashes, or ecchymoses.          ------------------------- NURSING NOTES AND VITALS REVIEWED ---------------------------  The nursing notes within the ED encounter and vital signs as below have been reviewed by myself  BP (!) 131/96   Pulse 98   Temp 36 °C (96.8 °F)   Resp 17   Ht 1.702 m (5' 7\")   Wt 81.6 kg (180 lb)   SpO2 99%   BMI 28.19 kg/m²     Oxygen Saturation Interpretation: 100% room air        The patient’s available past medical records and past encounters were reviewed.          -----------------------DIAGNOSTIC " RESULTS------------------------  LABS:    Labs Reviewed   DRUG SCREEN,URINE - Abnormal       Result Value    Amphetamine Screen, Urine Presumptive Negative      Barbiturate Screen, Urine Presumptive Positive (*)     Benzodiazepines Screen, Urine Presumptive Negative      Cannabinoid Screen, Urine Presumptive Positive (*)     Cocaine Metabolite Screen, Urine Presumptive Negative      Fentanyl Screen, Urine Presumptive Negative      Opiate Screen, Urine Presumptive Negative      Oxycodone Screen, Urine Presumptive Negative      PCP Screen, Urine Presumptive Negative      Methadone Screen, Urine Presumptive Negative      Narrative:     Drug screen results are presumptive and should not be used to assess   compliance with prescribed medication. Contact the performing [facility] laboratory   to add-on definitive confirmatory testing if clinically indicated.    Toxicology screening results are reported qualitatively. The concentration must   be greater than or equal to the cutoff to be reported as positive. The concentration   at which the screening test can detect an individual drug or metabolite varies.   The absence of expected drug(s) and/or drug metabolite(s) may indicate non-compliance,   inappropriate timing of specimen collection relative to drug administration, poor drug   absorption, diluted/adulterated urine, or limitations of testing. For medical purposes   only; not valid for forensic use.    Interpretive questions should be directed to the laboratory medical directors.   URINALYSIS WITH REFLEX CULTURE AND MICROSCOPIC - Abnormal    Color, Urine Light-Yellow      Appearance, Urine Clear      Specific Gravity, Urine 1.024      pH, Urine 7.0      Protein, Urine NEGATIVE      Glucose, Urine Normal      Blood, Urine NEGATIVE      Ketones, Urine TRACE (*)     Bilirubin, Urine NEGATIVE      Urobilinogen, Urine Normal      Nitrite, Urine NEGATIVE      Leukocyte Esterase, Urine NEGATIVE     CBC WITH AUTO DIFFERENTIAL -  Abnormal    WBC 16.5 (*)     nRBC 0.0      RBC 5.41 (*)     Hemoglobin 16.2 (*)     Hematocrit 47.5 (*)     MCV 88      MCH 29.9      MCHC 34.1      RDW 12.0      Platelets 395      Neutrophils % 82.9      Immature Granulocytes %, Automated 0.5      Lymphocytes % 11.9      Monocytes % 4.0      Eosinophils % 0.2      Basophils % 0.5      Neutrophils Absolute 13.64 (*)     Immature Granulocytes Absolute, Automated 0.08      Lymphocytes Absolute 1.95      Monocytes Absolute 0.66      Eosinophils Absolute 0.04      Basophils Absolute 0.08     COMPREHENSIVE METABOLIC PANEL - Abnormal    Glucose 134 (*)     Sodium 136      Potassium 4.1      Chloride 108 (*)     Bicarbonate 17 (*)     Anion Gap 15      Urea Nitrogen 11      Creatinine 0.59      eGFR >90      Calcium 9.4      Albumin 4.6      Alkaline Phosphatase 57      Total Protein 7.4      AST 15      Bilirubin, Total 0.7      ALT 32     HUMAN CHORIONIC GONADOTROPIN, SERUM QUANTITATIVE - Abnormal    HCG, Beta-Quantitative 28 (*)     Narrative:      Total HCG measurement is performed using the [employer] Access   Immunoassay which detects intact HCG and free beta HCG subunit.    This test is not indicated for use as a tumor marker.   HCG testing is performed using a different test methodology at [facility] than other [facility]. Direct result comparison   should only be made within the same method.       SARS-COV-2 AND INFLUENZA A/B PCR - Normal    Flu A Result Not Detected      Flu B Result Not Detected      Coronavirus 2019, PCR Not Detected      Narrative:     This assay is an FDA-cleared, in vitro diagnostic nucleic acid amplification test for the qualitative detection and differentiation of SARS CoV-2/ Influenza A/B from nasopharyngeal specimens collected from individuals with signs and symptoms of respiratory tract infections, and has been validated for use at [facility]. Negative results do not preclude COVID-19/  Influenza A/B infections and should not be used as the sole basis for diagnosis, treatment, or other management decisions. Testing for SARS CoV-2 is recommended only for patients who meet current clinical and/or epidemiological criteria defined by federal, state, or local public health directives.   HCG, URINE, QUALITATIVE - Normal    HCG, Urine NEGATIVE     ACUTE TOXICOLOGY PANEL, BLOOD - Normal    Acetaminophen <10.0      Salicylate  <3      Alcohol <10     LIPASE - Normal    Lipase 44      Narrative:     Venipuncture immediately after or during the administration of Metamizole may lead to falsely low results. Testing should be performed immediately prior to Metamizole dosing.   LACTATE - Normal    Lactate 1.6      Narrative:     Venipuncture immediately after or during the administration of Metamizole may lead to falsely low results. Testing should be performed immediately prior to Metamizole dosing.   URINALYSIS WITH REFLEX CULTURE AND MICROSCOPIC    Narrative:     The following orders were created for panel order Urinalysis with Reflex Culture and Microscopic.  Procedure                               Abnormality         Status                     ---------                               -----------         ------                     Urinalysis with Reflex C...[[id number]]  Abnormal            Final result               Extra Urine Gray Tube[[id number]]                                                         Please view results for these tests on the individual orders.   EXTRA URINE GRAY TUBE       As interpreted by me, the above displayed labs are abnormal. All other labs obtained during this visit were within normal range or not returned as of this dictation.      US pelvis transvaginal   Final Result   There is no evidence of an intrauterine pregnancy. Ectopic pregnancy   is neither seen nor excluded at this time. Continued monitoring with   serial Beta hCG values and/or follow up ultrasound is recommended.      Normal color and spectral Doppler flow within the bilateral ovaries.   Signed by Maurisio Syed MD      US pelvis   Final Result   Addendum (preliminary) 1 of 1   Interpreted By:  Evelio Foreman,    ADDENDUM:   Patient reportedly has a positive beta HCG of 28. Differential   considerations include early normal IUP, failed 1st trimester   pregnancy, or ectopic pregnancy of unknown location. The right   ovarian cystic lesion appears to represent a corpus luteum on the CT   scan however is suboptimally assessed transabdominally on ultrasound.   The emergency room team is reportedly concerned for ectopic   pregnancy. I recommend further evaluation with transvaginal   ultrasound for more optimal assessment of the adnexa.        Evelio Foreman discussed the significance and urgency of this   critical finding by telephone with  Dr Brizuela on 2/6/2025 at 7:09 pm.   (**-RCF-**) Findings:  See findings.             Signed by: Evelio Foreman 2/6/2025 7:10 PM        -------- ORIGINAL REPORT --------   Dictation workstation:   [id number]      Final   Right ovarian follicle.        No worrisome findings in the uterus.        Left ovary not visualized.        Signed by: Evelio Foreman 2/6/2025 5:24 PM   Dictation workstation:   [id number]      CT abdomen pelvis w IV contrast   Final Result   1.  The right adnexal region has a 2.1 cm ring-like enhancing   structure which may represent a collapsing cyst. Ectopic pregnancy   cannot be excluded. A trace amount of fluid is present in this region   as well.   2. Small hiatal hernia.             MACRO:   Critical Finding:  See findings. Notification was initiated on   2/6/2025 at 3:24 pm by  Jun Gómez.  (**-OCF-**)        Signed by: Jun Gómez 2/6/2025 3:24 PM   Dictation workstation:   [id number]      XR chest 1 view   Final Result   1.  No evidence of acute cardiopulmonary process.                  MACRO:   None        Signed by: Muna Noguera 2/6/2025 12:49 PM    Dictation workstation:   [id number]              US pelvis transvaginal   Final Result   There is no evidence of an intrauterine pregnancy. Ectopic pregnancy   is neither seen nor excluded at this time. Continued monitoring with   serial Beta hCG values and/or follow up ultrasound is recommended.     Normal color and spectral Doppler flow within the bilateral ovaries.   Signed by Maurisio Syed MD      US pelvis   Final Result   Addendum (preliminary) 1 of 1   Interpreted By:  Evelio Foreman,    ADDENDUM:   Patient reportedly has a positive beta HCG of 28. Differential   considerations include early normal IUP, failed 1st trimester   pregnancy, or ectopic pregnancy of unknown location. The right   ovarian cystic lesion appears to represent a corpus luteum on the CT   scan however is suboptimally assessed transabdominally on ultrasound.   The emergency room team is reportedly concerned for ectopic   pregnancy. I recommend further evaluation with transvaginal   ultrasound for more optimal assessment of the adnexa.        Evelio Foreman discussed the significance and urgency of this   critical finding by telephone with  Dr Brizuela on 2/6/2025 at 7:09 pm.   (**-F-**) Findings:  See findings.             Signed by: Evelio Foreman 2/6/2025 7:10 PM        -------- ORIGINAL REPORT --------   Dictation workstation:   [id number]      Final   Right ovarian follicle.        No worrisome findings in the uterus.        Left ovary not visualized.        Signed by: Evelio Foreman 2/6/2025 5:24 PM   Dictation workstation:   [id number]      CT abdomen pelvis w IV contrast   Final Result   1.  The right adnexal region has a 2.1 cm ring-like enhancing   structure which may represent a collapsing cyst. Ectopic pregnancy   cannot be excluded. A trace amount of fluid is present in this region   as well.   2. Small hiatal hernia.             MACRO:   Critical Finding:  See findings. Notification was initiated on    2/6/2025 at 3:24 pm by  Jun Gómez.  (**-OCF-**)        Signed by: Jun Gómez 2/6/2025 3:24 PM   Dictation workstation:   [id number]      XR chest 1 view   Final Result   1.  No evidence of acute cardiopulmonary process.                  MACRO:   None        Signed by: Muna Noguera 2/6/2025 12:49 PM   Dictation workstation:   [id number]              ------------------------------ ED COURSE/MEDICAL DECISION MAKING----------------------  Medical Decision Making:   Exam: A medically appropriate exam performed, outlined above, given the known history and presentation.    History obtained from: Review of medical record nursing notes patient      Social Determinants of Health considered during this visit: Takes care of herself at home      PAST MEDICAL HISTORY/Chronic Conditions Affecting Care     has a past medical history of Cervical dysplasia and Recovering alcoholic in remission (Multi).       CC/HPI Summary, Social Determinants of health, Records Reviewed, DDx, testing done/not done, ED Course, Reassessment, disposition considerations/shared decision making with patient, consults, disposition:   Presents with nausea vomiting  Plan  COVID flu  Zofran  Reglan  Benadryl  Normal saline   Tox screen  Drug screen  Pregnancy  Urine  CBC  CMP  Lipase   Ultrasound pelvis : Right ovarian follicle.  No worrisome findings in the uterus.  Left ovary not visualized.    CT abd1.  The right adnexal region has a 2.1 cm ring-like enhancing  structure which may represent a collapsing cyst. Ectopic pregnancy  cannot be excluded. A trace amount of fluid is present in this region  as well.  2. Small hiatal hernia.  Medical Decision Making/Differential Diagnosis:  Differentials include but not limited to viral illness versus COVID versus flu versus UTI versus pregnancy versus drug ingestion history of hyperemesis with marijuana use.  Review  Lipase 44  COVID flu negative  Glucose 134  Chloride 108 otherwise electrolytes  unremarkable  Bicarb 15-year-old  Normal renal function  Normal LFTs  White blood cell count 16.5  Hemoglobin 16.2  Tox screen negative  Drug screen positive for marijuana, barbiturates  Urine trace ketones  Pregnancy negative  Patient present to the emergency department with complaints of vomiting onset of symptoms this morning chills and bodyaches.  Received antiemetics here in the emergency department with some improvement then vomiting started again.  Received additional nausea medicine.  She is not tachycardic or hypotensive.  No signs of sepsis or toxicity.  Pregnancy negative.  Urine showed trace ketones patient received fluids otherwise unremarkable COVID and flu are negative.  Lipase normal.  Glucose 134 low otherwise electrolytes unremarkable bicarb of 15 normal renal function normal LFTs.  Hemoglobin 16.2 no signs of bleeding white blood cell count is 16.5 suspect this is more reactive from vomiting chest x-ray showed no acute cardiopulmonary process.  Urine is not consistent with UTI COVID and flu are negative drug screen positive for marijuana use and barbiturates patient [facility].  Patient states while she was at [facility] when she was on phenobarbital and Subutex.  Due to patient's continued nausea vomiting and abdominal discomfort CT of the abdomen pelvis was obtained pregnancy test negative.  CT showed Right adnexal region has a 2.1 cm ringlike enhancing structure.  Small hiatal hernia.  Beta quant was ordered 28 patient states she is actively trying to get pregnant.  Ultrasound ordered.  Right ovarian follicle.  No worrisome findings in the uterus.  Left ovary not visualized.  Consult to OB/GYN pending OB/GYN recommendations patient seen evaluate with attending physician Dr. Palacios  Please see his note for final pression disposition reevaluation he will be assuming care of patient at this time due to the end of my shift  PROCEDURES  Unless  otherwise noted below, none      CONSULTS:   None      ED Course as of 02/09/25 1632   Thu Feb 06, 2025   1027 Notified by nursing that patient is sticking her finger down her throat causing herself to vomit. [TB]   1130 Patient sleeping no further vomiting. [TB]   1210 Waking up dry heaving.  Requesting additional dose of Zofran [TB]   1326 Patient has no further vomiting or complaints of nausea persistent.  Discussed with pharmacy patient given droperidol.  Drug screen reviewed positive for marijuana and barbiturate.  Reviewed medications to determine if pulse positive patient does not appear to be on any medications that would trigger a barbiturate positive drug screen [TB]   1408 Patient continues to complain of pain soreness in her abdomen after vomiting.  No improvement with antiemetics CT ordered [TB]   1516 EKG interpreted by myself independently, EKG shows a normal sinus rhythm, rate 69 bpm, parable 120, QRS 98, , QTc 499, patient has no ST elevation or depression, negative for acute MI. [PEDRO]   1518 Patient requesting additional medication for nausea.  EKG ordered.  Additional medication ordered by attending physician [TB]   1528 CT abdomen pelvis w IV contrast  Pregnancy test negative ultrasound [TB]   1535 Discussed CT results with patient.  Patient states he has been trying to get pregnant.  Her last menstrual cycle was January 10.  She reports she has never been pregnant.  Also discussed her drug screen with the patient before she was on phenobarbital and Subutex at [facility].  Patient states she is feeling better with her nausea and pain at this time.  Was sleeping upon entering room. [TB]      ED Course User Index  [PEDRO] Edmar Richmond DO  [TB] Barbara Lopez, APRN-CNP         Diagnoses as of 02/09/25 1632   Leukocytosis, unspecified type   Nausea and vomiting, unspecified vomiting type   Marijuana use   Generalized abdominal pain   Hiatal hernia   Positive pregnancy test ([facility])          This patient has remained hemodynamically stable during their ED course.      Critical Care: none       Counseling:  The emergency provider has spoken with the patient and discussed today’s results, in addition to providing specific details for the plan of care and counseling regarding the diagnosis and prognosis.  Questions are answered at this time and they are agreeable with the plan.         --------------------------------- IMPRESSION AND DISPOSITION ---------------------------------    IMPRESSION  1. Leukocytosis, unspecified type    2. Nausea and vomiting, unspecified vomiting type    3. Marijuana use    4. Generalized abdominal pain    5. Hiatal hernia    6. Positive pregnancy test ([facility])        DISPOSITION  Disposition: Pending  Patient condition is stable        NOTE: This report was transcribed using voice recognition software. Every effort was made to ensure accuracy; however, inadvertent computerized transcription errors may be present      SAVI Craft  02/06/25 [id number]       SAVI Craft  02/09/25 1073

## 2025-02-06 NOTE — PROGRESS NOTES
Lizet Storm is a 29 y.o. female who is being seen in the Emergency Department for No Principal Problem currently listed. Pharmacy reviewed the patient's rkrft-gl-gpykcqati medications and allergies for accuracy.    Medications ADDED:  traZODone (Desyrel) 100 mg tablet  Medications CHANGED:  None  Medications REMOVED:   prenatal 93-iron-folate 9-dha 31 mg iron- 1 mg-200 mg capsule   ondansetron (Zofran) 4 mg tablet  ketorolac (Toradol) 10 mg tablet  doxycycline (Vibramycin) 100 mg capsule  benzocaine (Orajel) 10 % mucosal gel  Adderall  mg 24 hr capsule  Abilify 5 mg tablet    The list below reflects the updated PTA list. Comments regarding how patient may be taking medications differently can be found in the Admit Orders Activity  Prior to Admission Medications   Prescriptions Last Dose Informant   albuterol (Ventolin HFA) 90 mcg/actuation inhaler PRN Self   Sig: Inhale 2 puffs every 4 hours if needed for wheezing   or shortness of breath.      citalopram (CeleXA) 40 mg tablet 2/5/2025 Morning Self   Sig: Take 1 tablet (40 mg) by mouth once daily.   omeprazole (PriLOSEC) 40 mg DR capsule Past Month Self   Sig: TAKE 1 CAPSULE BY MOUTH EVERY MORNING   BEFORE MEALS   traZODone (Desyrel) 100 mg tablet 2/5/2025 Bedtime Self   Sig: Take 1 tablet (100 mg) by mouth once daily at bedtime.      Facility-Administered Medications: None        The list below reflects the updated allergy list. Please review each documented allergy for additional clarification and justification.  Allergies  Reviewed by Eddie Caballero on 2/6/2025   No Known Allergies         Pharmacy has been updated to [facility].    Sources used to complete the med history include:   Patient interviewed, good historian, dispense report, care everywhere, chart review history    Below are additional concerns with the patient's PTA list.  None    Edide Caballero, Omkar  Please reach out via [employer] Secure Chat for questions

## 2025-02-06 NOTE — ED PROVIDER NOTES
THIS IS MY RITA SUPERVISORY AND SHARED VISIT NOTE:    I personally saw the patient and made/approved the management plan and take responsibility for the patient management.    History: Patient is a 29-year-old female presenting with a chief complaint of flulike symptoms.  Patient had nausea and vomiting this morning, patient does have a history of cannabinoid hyperemesis, states she been unable to keep things down today, she has not used any marijuana today or yesterday but has used every day prior to that, she had chills but no fevers, no chest pain or shortness of breath, no dizziness or blurred vision, states she has no headache, no other acute complaints at this time.    Exam: GENERAL APPEARANCE: Awake and alert.     HEENT: Normocephalic, atraumatic. Extraocular muscles are intact. Pupils equal round and reactive to light.  CHEST: Nontender to palpation. Clear to auscultation bilaterally. No rales, rhonchi, or wheezing.   HEART: S1, S2. Regular rate and rhythm. No murmurs, gallops or rubs.  Strong and equal pulses in the extremities.   ABDOMEN: Soft,.  non-tender.  No rebound or guarding, bowel sounds normal x 4 quadrants  NEUROLOGICAL: Awake, alert and oriented x 3.       MDM: Patient seen and evaluated at bedside, patient is in no acute distress.  I will order a CBC, CMP, urinalysis, lactate, urine frag, drug screen, COVID and flu, lipase, toxicology panel, CBC, CMP, CT abdomen pelvis with IV contrast, x-ray chest, give the patient Zofran, Reglan, Benadryl, normal saline, droperidol. Differential diagnosis includes but is not limited to cannabinoid hyperemesis syndrome, influenza, COVID-19, viral gastroenteritis, diverticulitis,  Patient's lab work did show a leukocytosis of 16.5, hemoglobin 16.2, hematocrit 47.5, imaging on the x-ray of the chest shows no acute findings, CT abdomen pelvis raise a questional right adnexal ring-enhancing structure be coapting cyst, however ectopic pregnancy cannot be excluded.   We did add on a beta hCG at this time, which was 28, case was discussed with OB/GYN, we did add on a ultrasound, patient is pending results of ultrasound at time of my departure, patient signed out to oncoming clinician.    Diagnosis: Leukocytosis, nausea and vomiting, marijuana use, generalized abdominal pain, hiatal hernia, positive pregnancy test  US pelvis transvaginal   Final Result   There is no evidence of an intrauterine pregnancy. Ectopic pregnancy   is neither seen nor excluded at this time. Continued monitoring with   serial Beta hCG values and/or follow up ultrasound is recommended.     Normal color and spectral Doppler flow within the bilateral ovaries.   Signed by Maurisio Syed MD      US pelvis   Final Result   Addendum (preliminary) 1 of 1   Interpreted By:  Evelio Foreman,    ADDENDUM:   Patient reportedly has a positive beta HCG of 28. Differential   considerations include early normal IUP, failed 1st trimester   pregnancy, or ectopic pregnancy of unknown location. The right   ovarian cystic lesion appears to represent a corpus luteum on the CT   scan however is suboptimally assessed transabdominally on ultrasound.   The emergency room team is reportedly concerned for ectopic   pregnancy. I recommend further evaluation with transvaginal   ultrasound for more optimal assessment of the adnexa.        Evelio Foreman discussed the significance and urgency of this   critical finding by telephone with  Dr Brizuela on 2/6/2025 at 7:09 pm.   (**-RCF-**) Findings:  See findings.             Signed by: Evelio Foreman 2/6/2025 7:10 PM        -------- ORIGINAL REPORT --------   Dictation workstation:   [id number]      Final   Right ovarian follicle.        No worrisome findings in the uterus.        Left ovary not visualized.        Signed by: Evelio Foreman 2/6/2025 5:24 PM   Dictation workstation:   [id number]      CT abdomen pelvis w IV contrast   Final Result   1.  The right adnexal region has  a 2.1 cm ring-like enhancing   structure which may represent a collapsing cyst. Ectopic pregnancy   cannot be excluded. A trace amount of fluid is present in this region   as well.   2. Small hiatal hernia.             MACRO:   Critical Finding:  See findings. Notification was initiated on   2/6/2025 at 3:24 pm by  Jun Gómez.  (**-OCF-**)        Signed by: Jun Góemz 2/6/2025 3:24 PM   Dictation workstation:   [id number]      XR chest 1 view   Final Result   1.  No evidence of acute cardiopulmonary process.                  MACRO:   None        Signed by: Muna Noguera 2/6/2025 12:49 PM   Dictation workstation:   [id number]        Results for orders placed or performed during the hospital encounter of 02/06/25   Acute Toxicology Panel, Blood    Collection Time: 02/06/25 10:14 AM   Result Value Ref Range    Acetaminophen <10.0 10.0 - 30.0 ug/mL    Salicylate  <3 4 - 20 mg/dL    Alcohol <10 <=10 mg/dL   CBC and Auto Differential    Collection Time: 02/06/25 10:14 AM   Result Value Ref Range    WBC 16.5 (H) 4.4 - 11.3 x10*3/uL    nRBC 0.0 0.0 - 0.0 /100 WBCs    RBC 5.41 (H) 4.00 - 5.20 x10*6/uL    Hemoglobin 16.2 (H) 12.0 - 16.0 g/dL    Hematocrit 47.5 (H) 36.0 - 46.0 %    MCV 88 80 - 100 fL    MCH 29.9 26.0 - 34.0 pg    MCHC 34.1 32.0 - 36.0 g/dL    RDW 12.0 11.5 - 14.5 %    Platelets 395 150 - 450 x10*3/uL    Neutrophils % 82.9 40.0 - 80.0 %    Immature Granulocytes %, Automated 0.5 0.0 - 0.9 %    Lymphocytes % 11.9 13.0 - 44.0 %    Monocytes % 4.0 2.0 - 10.0 %    Eosinophils % 0.2 0.0 - 6.0 %    Basophils % 0.5 0.0 - 2.0 %    Neutrophils Absolute 13.64 (H) 1.20 - 7.70 x10*3/uL    Immature Granulocytes Absolute, Automated 0.08 0.00 - 0.70 x10*3/uL    Lymphocytes Absolute 1.95 1.20 - 4.80 x10*3/uL    Monocytes Absolute 0.66 0.10 - 1.00 x10*3/uL    Eosinophils Absolute 0.04 0.00 - 0.70 x10*3/uL    Basophils Absolute 0.08 0.00 - 0.10 x10*3/uL   Comprehensive metabolic panel    Collection Time: 02/06/25 10:14 AM    Result Value Ref Range    Glucose 134 (H) 74 - 99 mg/dL    Sodium 136 136 - 145 mmol/L    Potassium 4.1 3.5 - 5.3 mmol/L    Chloride 108 (H) 98 - 107 mmol/L    Bicarbonate 17 (L) 21 - 32 mmol/L    Anion Gap 15 10 - 20 mmol/L    Urea Nitrogen 11 6 - 23 mg/dL    Creatinine 0.59 0.50 - 1.05 mg/dL    eGFR >90 >60 mL/min/1.73m*2    Calcium 9.4 8.6 - 10.3 mg/dL    Albumin 4.6 3.4 - 5.0 g/dL    Alkaline Phosphatase 57 33 - 110 U/L    Total Protein 7.4 6.4 - 8.2 g/dL    AST 15 9 - 39 U/L    Bilirubin, Total 0.7 0.0 - 1.2 mg/dL    ALT 32 7 - 45 U/L   Lipase    Collection Time: 02/06/25 10:14 AM   Result Value Ref Range    Lipase 44 9 - 82 U/L   Human Chorionic Gonadotropin, Serum Quantitative    Collection Time: 02/06/25 10:14 AM   Result Value Ref Range    HCG, Beta-Quantitative 28 (H) <5 mIU/mL   Sars-CoV-2 and Influenza A/B PCR    Collection Time: 02/06/25 10:19 AM   Result Value Ref Range    Flu A Result Not Detected Not Detected    Flu B Result Not Detected Not Detected    Coronavirus 2019, PCR Not Detected Not Detected   hCG, Urine, Qualitative    Collection Time: 02/06/25 12:10 PM   Result Value Ref Range    HCG, Urine NEGATIVE NEGATIVE   Drug Screen, Urine    Collection Time: 02/06/25 12:10 PM   Result Value Ref Range    Amphetamine Screen, Urine Presumptive Negative Presumptive Negative    Barbiturate Screen, Urine Presumptive Positive (A) Presumptive Negative    Benzodiazepines Screen, Urine Presumptive Negative Presumptive Negative    Cannabinoid Screen, Urine Presumptive Positive (A) Presumptive Negative    Cocaine Metabolite Screen, Urine Presumptive Negative Presumptive Negative    Fentanyl Screen, Urine Presumptive Negative Presumptive Negative    Opiate Screen, Urine Presumptive Negative Presumptive Negative    Oxycodone Screen, Urine Presumptive Negative Presumptive Negative    PCP Screen, Urine Presumptive Negative Presumptive Negative    Methadone Screen, Urine Presumptive Negative Presumptive Negative    Urinalysis with Reflex Culture and Microscopic    Collection Time: 02/06/25 12:11 PM   Result Value Ref Range    Color, Urine Light-Yellow Light-Yellow, Yellow, Dark-Yellow    Appearance, Urine Clear Clear    Specific Gravity, Urine 1.024 1.005 - 1.035    pH, Urine 7.0 5.0, 5.5, 6.0, 6.5, 7.0, 7.5, 8.0    Protein, Urine NEGATIVE NEGATIVE, 10 (TRACE), 20 (TRACE) mg/dL    Glucose, Urine Normal Normal mg/dL    Blood, Urine NEGATIVE NEGATIVE mg/dL    Ketones, Urine TRACE (A) NEGATIVE mg/dL    Bilirubin, Urine NEGATIVE NEGATIVE mg/dL    Urobilinogen, Urine Normal Normal mg/dL    Nitrite, Urine NEGATIVE NEGATIVE    Leukocyte Esterase, Urine NEGATIVE NEGATIVE   Lactate    Collection Time: 02/06/25  4:55 PM   Result Value Ref Range    Lactate 1.6 0.4 - 2.0 mmol/L     .    Please see RITA note for further details    Sections of this report were created using voice-to-text technology and may contain errors in translation    Edmar Richmond DO  Emergency Medicine       Edmar Richmond DO  02/09/25 1829

## 2025-02-07 NOTE — ED PROVIDER NOTES
Patient was received in signout at 7:32 PM.     IN BRIEF    29F presents with question of possible ectopic. Found to have a questionable R adnexal cyst vs ectopic. At the time of handoff pending transvaginal US and disposition.       ED COURSE   Transvaginal ultrasound returned demonstrating A corpus luteal cyst on the right but no evidence of intrauterine pregnancy.  This is expected as the hCG is only minimally elevated.  Case is reviewed again with the patient's gynecologist Dr. Brizuela.  She recommended a repeat hCG be performed in 72 hours and the patient have close ED follow-up next week.  This is relayed to the patient.  She is feeling proved and her symptoms at this time.  She will be given a prescription for Zofran and importance of follow-up and repeat lab work as discussed.  She verbalized understanding and agrees with plan of discharge.  Discharged in stable condition.        FINAL IMPRESSION      1. Leukocytosis, unspecified type    2. Nausea and vomiting, unspecified vomiting type    3. Marijuana use    4. Generalized abdominal pain    5. Hiatal hernia    6. Positive pregnancy test ([facility])          DISPOSITION    Discharge 02/06/2025 10:58:43 PM     Keesha Vasquez, DO  02/07/25 0018

## 2025-02-07 NOTE — DISCHARGE INSTRUCTIONS
Your hCG level was 28 here.  This may indicate a very very early pregnancy.  Contact Dr. Brizuela's office tomorrow and tell them that you require follow-up from the emergency department.  She would like to see you before next Friday.  Return to the outpatient lab on Monday for a repeat hCG level draw.  If you have any change or worsening of your symptoms return immediately for reevaluation.

## 2025-02-08 ENCOUNTER — HOSPITAL ENCOUNTER (EMERGENCY)
Facility: HOSPITAL | Age: 30
Discharge: HOME | End: 2025-02-08
Attending: STUDENT IN AN ORGANIZED HEALTH CARE EDUCATION/TRAINING PROGRAM
Payer: COMMERCIAL

## 2025-02-08 VITALS
HEART RATE: 71 BPM | SYSTOLIC BLOOD PRESSURE: 118 MMHG | TEMPERATURE: 97.7 F | HEIGHT: 67 IN | RESPIRATION RATE: 16 BRPM | BODY MASS INDEX: 27.47 KG/M2 | OXYGEN SATURATION: 100 % | DIASTOLIC BLOOD PRESSURE: 75 MMHG | WEIGHT: 175 LBS

## 2025-02-08 DIAGNOSIS — R79.89 ELEVATED SERUM HCG: ICD-10-CM

## 2025-02-08 DIAGNOSIS — R11.2 NAUSEA AND VOMITING, UNSPECIFIED VOMITING TYPE: Primary | ICD-10-CM

## 2025-02-08 LAB
ALBUMIN SERPL BCP-MCNC: 4.6 G/DL (ref 3.4–5)
ALP SERPL-CCNC: 54 U/L (ref 33–110)
ALT SERPL W P-5'-P-CCNC: 109 U/L (ref 7–45)
ANION GAP SERPL CALCULATED.3IONS-SCNC: 12 MMOL/L (ref 10–20)
APPEARANCE UR: CLEAR
AST SERPL W P-5'-P-CCNC: 39 U/L (ref 9–39)
B-HCG SERPL-ACNC: 91 MIU/ML
BASOPHILS # BLD AUTO: 0.09 X10*3/UL (ref 0–0.1)
BASOPHILS NFR BLD AUTO: 0.5 %
BILIRUB SERPL-MCNC: 0.7 MG/DL (ref 0–1.2)
BILIRUB UR STRIP.AUTO-MCNC: NEGATIVE MG/DL
BUN SERPL-MCNC: 13 MG/DL (ref 6–23)
CALCIUM SERPL-MCNC: 9.1 MG/DL (ref 8.6–10.3)
CHLORIDE SERPL-SCNC: 107 MMOL/L (ref 98–107)
CO2 SERPL-SCNC: 20 MMOL/L (ref 21–32)
COLOR UR: ABNORMAL
CREAT SERPL-MCNC: 0.56 MG/DL (ref 0.5–1.05)
EGFRCR SERPLBLD CKD-EPI 2021: >90 ML/MIN/1.73M*2
EOSINOPHIL # BLD AUTO: 0.03 X10*3/UL (ref 0–0.7)
EOSINOPHIL NFR BLD AUTO: 0.2 %
ERYTHROCYTE [DISTWIDTH] IN BLOOD BY AUTOMATED COUNT: 12 % (ref 11.5–14.5)
FLUAV RNA RESP QL NAA+PROBE: NOT DETECTED
FLUBV RNA RESP QL NAA+PROBE: NOT DETECTED
GLUCOSE SERPL-MCNC: 125 MG/DL (ref 74–99)
GLUCOSE UR STRIP.AUTO-MCNC: NORMAL MG/DL
HCT VFR BLD AUTO: 45.3 % (ref 36–46)
HGB BLD-MCNC: 15.4 G/DL (ref 12–16)
HOLD SPECIMEN: NORMAL
IMM GRANULOCYTES # BLD AUTO: 0.07 X10*3/UL (ref 0–0.7)
IMM GRANULOCYTES NFR BLD AUTO: 0.4 % (ref 0–0.9)
KETONES UR STRIP.AUTO-MCNC: ABNORMAL MG/DL
LEUKOCYTE ESTERASE UR QL STRIP.AUTO: NEGATIVE
LIPASE SERPL-CCNC: 43 U/L (ref 9–82)
LYMPHOCYTES # BLD AUTO: 1.85 X10*3/UL (ref 1.2–4.8)
LYMPHOCYTES NFR BLD AUTO: 10.5 %
MCH RBC QN AUTO: 30.1 PG (ref 26–34)
MCHC RBC AUTO-ENTMCNC: 34 G/DL (ref 32–36)
MCV RBC AUTO: 89 FL (ref 80–100)
MONOCYTES # BLD AUTO: 0.75 X10*3/UL (ref 0.1–1)
MONOCYTES NFR BLD AUTO: 4.3 %
NEUTROPHILS # BLD AUTO: 14.79 X10*3/UL (ref 1.2–7.7)
NEUTROPHILS NFR BLD AUTO: 84.1 %
NITRITE UR QL STRIP.AUTO: NEGATIVE
NRBC BLD-RTO: 0 /100 WBCS (ref 0–0)
PH UR STRIP.AUTO: 6.5 [PH]
PLATELET # BLD AUTO: 379 X10*3/UL (ref 150–450)
POTASSIUM SERPL-SCNC: 3.9 MMOL/L (ref 3.5–5.3)
PROT SERPL-MCNC: 7.3 G/DL (ref 6.4–8.2)
PROT UR STRIP.AUTO-MCNC: NEGATIVE MG/DL
RBC # BLD AUTO: 5.12 X10*6/UL (ref 4–5.2)
RBC # UR STRIP.AUTO: NEGATIVE MG/DL
RSV RNA RESP QL NAA+PROBE: NOT DETECTED
SARS-COV-2 RNA RESP QL NAA+PROBE: NOT DETECTED
SODIUM SERPL-SCNC: 135 MMOL/L (ref 136–145)
SP GR UR STRIP.AUTO: 1.02
UROBILINOGEN UR STRIP.AUTO-MCNC: NORMAL MG/DL
WBC # BLD AUTO: 17.6 X10*3/UL (ref 4.4–11.3)

## 2025-02-08 PROCEDURE — 87634 RSV DNA/RNA AMP PROBE: CPT | Performed by: PHYSICIAN ASSISTANT

## 2025-02-08 PROCEDURE — 96374 THER/PROPH/DIAG INJ IV PUSH: CPT

## 2025-02-08 PROCEDURE — 99284 EMERGENCY DEPT VISIT MOD MDM: CPT | Performed by: STUDENT IN AN ORGANIZED HEALTH CARE EDUCATION/TRAINING PROGRAM

## 2025-02-08 PROCEDURE — 85025 COMPLETE CBC W/AUTO DIFF WBC: CPT | Performed by: PHYSICIAN ASSISTANT

## 2025-02-08 PROCEDURE — 84702 CHORIONIC GONADOTROPIN TEST: CPT | Performed by: PHYSICIAN ASSISTANT

## 2025-02-08 PROCEDURE — 96361 HYDRATE IV INFUSION ADD-ON: CPT

## 2025-02-08 PROCEDURE — 36415 COLL VENOUS BLD VENIPUNCTURE: CPT | Performed by: PHYSICIAN ASSISTANT

## 2025-02-08 PROCEDURE — 87637 SARSCOV2&INF A&B&RSV AMP PRB: CPT | Performed by: PHYSICIAN ASSISTANT

## 2025-02-08 PROCEDURE — 83690 ASSAY OF LIPASE: CPT | Performed by: PHYSICIAN ASSISTANT

## 2025-02-08 PROCEDURE — 96375 TX/PRO/DX INJ NEW DRUG ADDON: CPT

## 2025-02-08 PROCEDURE — 2500000004 HC RX 250 GENERAL PHARMACY W/ HCPCS (ALT 636 FOR OP/ED): Performed by: PHYSICIAN ASSISTANT

## 2025-02-08 PROCEDURE — 81003 URINALYSIS AUTO W/O SCOPE: CPT | Performed by: PHYSICIAN ASSISTANT

## 2025-02-08 PROCEDURE — 80053 COMPREHEN METABOLIC PANEL: CPT | Performed by: PHYSICIAN ASSISTANT

## 2025-02-08 RX ORDER — HALOPERIDOL 5 MG/ML
2 INJECTION INTRAMUSCULAR ONCE
Status: COMPLETED | OUTPATIENT
Start: 2025-02-08 | End: 2025-02-08

## 2025-02-08 RX ORDER — FAMOTIDINE 10 MG/ML
20 INJECTION INTRAVENOUS ONCE
Status: COMPLETED | OUTPATIENT
Start: 2025-02-08 | End: 2025-02-08

## 2025-02-08 RX ORDER — DIPHENHYDRAMINE HYDROCHLORIDE 50 MG/ML
25 INJECTION INTRAMUSCULAR; INTRAVENOUS ONCE
Status: COMPLETED | OUTPATIENT
Start: 2025-02-08 | End: 2025-02-08

## 2025-02-08 RX ORDER — ONDANSETRON 4 MG/1
4 TABLET, ORALLY DISINTEGRATING ORAL ONCE
Status: COMPLETED | OUTPATIENT
Start: 2025-02-08 | End: 2025-02-08

## 2025-02-08 RX ADMIN — DIPHENHYDRAMINE HYDROCHLORIDE 25 MG: 50 INJECTION, SOLUTION INTRAMUSCULAR; INTRAVENOUS at 09:32

## 2025-02-08 RX ADMIN — ONDANSETRON 4 MG: 4 TABLET, ORALLY DISINTEGRATING ORAL at 08:53

## 2025-02-08 RX ADMIN — FAMOTIDINE 20 MG: 10 INJECTION, SOLUTION INTRAVENOUS at 09:32

## 2025-02-08 RX ADMIN — SODIUM CHLORIDE, SODIUM LACTATE, POTASSIUM CHLORIDE, AND CALCIUM CHLORIDE 1000 ML: 600; 310; 30; 20 INJECTION, SOLUTION INTRAVENOUS at 09:31

## 2025-02-08 RX ADMIN — HALOPERIDOL LACTATE 2 MG: 5 INJECTION, SOLUTION INTRAMUSCULAR at 09:31

## 2025-02-08 ASSESSMENT — PAIN - FUNCTIONAL ASSESSMENT
PAIN_FUNCTIONAL_ASSESSMENT: 0-10
PAIN_FUNCTIONAL_ASSESSMENT: 0-10

## 2025-02-08 ASSESSMENT — PAIN SCALES - GENERAL
PAINLEVEL_OUTOF10: 0 - NO PAIN
PAINLEVEL_OUTOF10: 0 - NO PAIN

## 2025-02-08 ASSESSMENT — LIFESTYLE VARIABLES
HAVE YOU EVER FELT YOU SHOULD CUT DOWN ON YOUR DRINKING: NO
HAVE PEOPLE ANNOYED YOU BY CRITICIZING YOUR DRINKING: NO
TOTAL SCORE: 0
EVER HAD A DRINK FIRST THING IN THE MORNING TO STEADY YOUR NERVES TO GET RID OF A HANGOVER: NO
EVER FELT BAD OR GUILTY ABOUT YOUR DRINKING: NO

## 2025-02-08 NOTE — ED PROVIDER NOTES
HPI   Chief Complaint   Patient presents with   • Vomiting       29-year-old female presented emergency department with a chief complaint of several days of nausea, vomiting.  She was seen in the emergency department 2 days ago for lower abdominal discomfort.  She had a CT scan and ultrasound at that time.  Her imaging was concerning for potential ectopic pregnancy versus ovarian cyst.  She had a nonspecifically elevated hCG quantitative to 28 with a negative urine hCG.  States that her symptoms have not in proved in terms of nausea or vomiting since then.  She has been taking Zofran at home with minimal effect.  She denies any vaginal bleeding or discharge.  She denies diarrhea.  She denies fever cough or chills.  Nontoxic-appearing.  No other complaint.              Patient History   Past Medical History:   Diagnosis Date   • Cervical dysplasia    • Recovering alcoholic in remission (Multi)      Past Surgical History:   Procedure Laterality Date   • CERVICAL BIOPSY  W/ LOOP ELECTRODE EXCISION     • COSMETIC SURGERY  2022    BBL & lipo   • TONSILLECTOMY       No family history on file.  Social History     Tobacco Use   • Smoking status: Every Day     Current packs/day: 0.50     Types: Cigarettes     Passive exposure: Never   • Smokeless tobacco: Never   Vaping Use   • Vaping status: Every Day   • Substances: Nicotine, THC, CBD, Flavoring   • Devices: Disposable, Pre-filled or refillable cartridge   Substance Use Topics   • Alcohol use: Not Currently     Comment: States used to be a really bad alcoholic   • Drug use: Yes     Types: Marijuana       Physical Exam   ED Triage Vitals [02/08/25 0853]   Temperature Heart Rate Respirations BP   36.5 °C (97.7 °F) 79 20 120/87      Pulse Ox Temp src Heart Rate Source Patient Position   100 % -- -- --      BP Location FiO2 (%)     -- --       Physical Exam  Vitals and nursing note reviewed.   Constitutional:       Appearance: Normal appearance.   HENT:      Head:  Normocephalic.      Nose: Nose normal.      Mouth/Throat:      Mouth: Mucous membranes are moist.   Cardiovascular:      Rate and Rhythm: Normal rate and regular rhythm.      Pulses: Normal pulses.      Heart sounds: Normal heart sounds.   Pulmonary:      Breath sounds: Normal breath sounds.   Abdominal:      General: Abdomen is flat.      Palpations: Abdomen is soft.   Musculoskeletal:         General: Normal range of motion.      Cervical back: Normal range of motion.   Skin:     General: Skin is warm.   Neurological:      General: No focal deficit present.      Mental Status: She is alert and oriented to person, place, and time.   Psychiatric:         Mood and Affect: Mood normal.           ED Course & MDM   Diagnoses as of 02/08/25 1108   Nausea and vomiting, unspecified vomiting type   Elevated serum hCG                 No data recorded     Romario Coma Scale Score: 15 (02/08/25 0852 : Yuki Parker RN)                           Medical Decision Making  I have seen and evaluated this patient.  Physician available for consultation.  Vital signs have been reviewed.  All laboratory and diagnostic imaging is reviewed by myself and interpreted by myself unless otherwise stated.  Additionally imaging is interpreted by radiologist.    Patient was [address] leukocytosis likely reactive similar to yesterday.  No significant anemia metabolic panel without maris renal impairment electrolyte abnormality, hCG has risen to 91 from 28.  Patient not having significant abdominal pain and had recent imaging no indication for repeat of emergent imaging.  Patient will need this value again rechecked in 3 days.  Given OB/GYN follow-up.  Released in stable condition from emergent standpoint.          Labs Reviewed   HUMAN CHORIONIC GONADOTROPIN, SERUM QUANTITATIVE - Abnormal       Result Value    HCG, Beta-Quantitative 91 (*)     Narrative:      Total HCG measurement is performed using the [employer] Access   Immunoassay which  detects intact HCG and free beta HCG subunit.    This test is not indicated for use as a tumor marker.   HCG testing is performed using a different test methodology at [facility] than other [facility]. Direct result comparison   should only be made within the same method.       CBC WITH AUTO DIFFERENTIAL - Abnormal    WBC 17.6 (*)     nRBC 0.0      RBC 5.12      Hemoglobin 15.4      Hematocrit 45.3      MCV 89      MCH 30.1      MCHC 34.0      RDW 12.0      Platelets 379      Neutrophils % 84.1      Immature Granulocytes %, Automated 0.4      Lymphocytes % 10.5      Monocytes % 4.3      Eosinophils % 0.2      Basophils % 0.5      Neutrophils Absolute 14.79 (*)     Immature Granulocytes Absolute, Automated 0.07      Lymphocytes Absolute 1.85      Monocytes Absolute 0.75      Eosinophils Absolute 0.03      Basophils Absolute 0.09     COMPREHENSIVE METABOLIC PANEL - Abnormal    Glucose 125 (*)     Sodium 135 (*)     Potassium 3.9      Chloride 107      Bicarbonate 20 (*)     Anion Gap 12      Urea Nitrogen 13      Creatinine 0.56      eGFR >90      Calcium 9.1      Albumin 4.6      Alkaline Phosphatase 54      Total Protein 7.3      AST 39      Bilirubin, Total 0.7       (*)    URINALYSIS WITH REFLEX CULTURE AND MICROSCOPIC - Abnormal    Color, Urine Light-Yellow      Appearance, Urine Clear      Specific Gravity, Urine 1.025      pH, Urine 6.5      Protein, Urine NEGATIVE      Glucose, Urine Normal      Blood, Urine NEGATIVE      Ketones, Urine 10 (1+) (*)     Bilirubin, Urine NEGATIVE      Urobilinogen, Urine Normal      Nitrite, Urine NEGATIVE      Leukocyte Esterase, Urine NEGATIVE     SARS-COV-2 AND INFLUENZA A/B PCR - Normal    Flu A Result Not Detected      Flu B Result Not Detected      Coronavirus 2019, PCR Not Detected      Narrative:     This assay is an FDA-cleared, in vitro diagnostic nucleic acid amplification test for the qualitative detection and differentiation of SARS  CoV-2/ Influenza A/B from nasopharyngeal specimens collected from individuals with signs and symptoms of respiratory tract infections, and has been validated for use at [facility]. Negative results do not preclude COVID-19/ Influenza A/B infections and should not be used as the sole basis for diagnosis, treatment, or other management decisions. Testing for SARS CoV-2 is recommended only for patients who meet current clinical and/or epidemiological criteria defined by federal, state, or local public health directives.   RSV PCR - Normal    RSV PCR Not Detected      Narrative:     This assay is an FDA-cleared, in vitro diagnostic nucleic acid amplification test for the detection of RSV from nasopharyngeal specimens, and has been validated for use at [facility]. Negative results do not preclude RSV infections, and should not be used as the sole basis for diagnosis, treatment, or other management decisions. If Influenza A/B and RSV PCR results are negative, testing for Parainfluenza virus, Adenovirus and Metapneumovirus is routinely performed for pediatric oncology and intensive care inpatients at [facility], and is available on other patients by placing an add-on request.       LIPASE - Normal    Lipase 43      Narrative:     Venipuncture immediately after or during the administration of Metamizole may lead to falsely low results. Testing should be performed immediately prior to Metamizole dosing.   URINALYSIS WITH REFLEX CULTURE AND MICROSCOPIC    Narrative:     The following orders were created for panel order Urinalysis with Reflex Culture and Microscopic.  Procedure                               Abnormality         Status                     ---------                               -----------         ------                     Urinalysis with Reflex C...[[id number]]  Abnormal            Final result               Extra Urine Gray Tube[[id number]]                            In  process                   Please view results for these tests on the individual orders.   EXTRA URINE GRAY TUBE     No orders to display     Medications   ondansetron ODT (Zofran-ODT) disintegrating tablet 4 mg (4 mg oral Given 2/8/25 0853)   haloperidol lactate (Haldol) injection 2 mg (2 mg intravenous Given 2/8/25 0931)   lactated Ringer's bolus 1,000 mL (1,000 mL intravenous New Bag 2/8/25 0931)   famotidine PF (Pepcid) injection 20 mg (20 mg intravenous Given 2/8/25 0932)   diphenhydrAMINE (BENADryl) injection 25 mg (25 mg intravenous Given 2/8/25 0932)     New Prescriptions    No medications on file         Procedure  Procedures     Burt Lewis PA-C  02/08/25 6701

## 2025-02-08 NOTE — ED TRIAGE NOTES
Pt states she was here on Thursday for n/v/d and states that she is having the same symptoms today.

## 2025-02-10 ENCOUNTER — TELEPHONE (OUTPATIENT)
Dept: OBSTETRICS AND GYNECOLOGY | Facility: CLINIC | Age: 30
End: 2025-02-10

## 2025-02-10 ENCOUNTER — LAB (OUTPATIENT)
Dept: LAB | Facility: HOSPITAL | Age: 30
End: 2025-02-10
Payer: COMMERCIAL

## 2025-02-10 NOTE — TELEPHONE ENCOUNTER
Est pt last seen 12/31/2024. Pt called into the office stating she was in the ER ([facility]) twice in the past 3 days for nausea. Pt states hcg levels have been low and told possible chemical pregnancy. Pt states she completed another hcg test today, and has not been notified yet of results.     Pt states she just wants to what could be causing her so much nausea, that zofran is not helping.     Pt's LMP 1/11/2025    Nurse advised pt to give the hospital a call for results by the end of the day or early tomorrow, I do not see them in her labs.     Nurse told pt a message will be sent to Dr. Brizuela so she is aware of situation.

## 2025-02-11 LAB — B-HCG SERPL-ACNC: 207 MIU/ML

## 2025-02-11 NOTE — TELEPHONE ENCOUNTER
Pt calling wanting to know what to do next. Pt states her most recent HCG is 207; showing slow rise. Pt also complaining about extreme nausea and wants to know if it is due to the pregnancy. Please advise next steps.

## 2025-02-11 NOTE — TELEPHONE ENCOUNTER
The level increased from 91 to 207 in 2 days, appropriate at this early stage.  Schedule her initial OB visit with dating ultrasound for 6 weeks from the [facility].  Patient may repeat her hCG level 72 hours ( more) from the previous draw from 2/10 to allow time to double.   Nausea can be due to pregnancy hormones. Take B6 25 mg three times a day (~every 8 hrs ) to help reduce nausea, the safest treatment for now.   Take prenatal vitamin at bedtime to help.

## 2025-02-11 NOTE — TELEPHONE ENCOUNTER
Pt made aware. Pt stated she started taking B6 yesterday and changed her diet and has been feeling better. Pt stated she is taking a prenatal vitamin.     Initiated OB appt 2/21

## 2025-02-14 ENCOUNTER — TELEPHONE (OUTPATIENT)
Dept: OBSTETRICS AND GYNECOLOGY | Facility: CLINIC | Age: 30
End: 2025-02-14
Payer: COMMERCIAL

## 2025-02-14 DIAGNOSIS — Z32.01 PREGNANCY TEST POSITIVE (HHS-HCC): Primary | ICD-10-CM

## 2025-02-15 DIAGNOSIS — R93.89 ABNORMAL ULTRASOUND: ICD-10-CM

## 2025-02-15 DIAGNOSIS — Z32.01 PREGNANCY TEST POSITIVE (HHS-HCC): Primary | ICD-10-CM

## 2025-02-15 LAB — B-HCG SERPL-ACNC: 839 MIU/ML

## 2025-02-16 ENCOUNTER — HOSPITAL ENCOUNTER (EMERGENCY)
Facility: HOSPITAL | Age: 30
Discharge: ANOTHER HOSPITAL | End: 2025-02-17
Attending: EMERGENCY MEDICINE
Payer: COMMERCIAL

## 2025-02-16 VITALS
TEMPERATURE: 97.9 F | OXYGEN SATURATION: 100 % | BODY MASS INDEX: 26.68 KG/M2 | WEIGHT: 170 LBS | RESPIRATION RATE: 24 BRPM | DIASTOLIC BLOOD PRESSURE: 102 MMHG | HEART RATE: 98 BPM | SYSTOLIC BLOOD PRESSURE: 121 MMHG | HEIGHT: 67 IN

## 2025-02-16 DIAGNOSIS — R11.2 NAUSEA AND VOMITING, UNSPECIFIED VOMITING TYPE: Primary | ICD-10-CM

## 2025-02-16 LAB
ALBUMIN SERPL BCP-MCNC: 4.3 G/DL (ref 3.4–5)
ALP SERPL-CCNC: 59 U/L (ref 33–110)
ALT SERPL W P-5'-P-CCNC: 29 U/L (ref 7–45)
ANION GAP SERPL CALCULATED.3IONS-SCNC: 16 MMOL/L (ref 10–20)
APPEARANCE UR: CLEAR
AST SERPL W P-5'-P-CCNC: 14 U/L (ref 9–39)
B-HCG SERPL-ACNC: 1829 MIU/ML
BASOPHILS # BLD AUTO: 0.04 X10*3/UL (ref 0–0.1)
BASOPHILS NFR BLD AUTO: 0.3 %
BILIRUB SERPL-MCNC: 0.6 MG/DL (ref 0–1.2)
BILIRUB UR STRIP.AUTO-MCNC: NEGATIVE MG/DL
BUN SERPL-MCNC: 7 MG/DL (ref 6–23)
CALCIUM SERPL-MCNC: 9.3 MG/DL (ref 8.6–10.3)
CHLORIDE SERPL-SCNC: 105 MMOL/L (ref 98–107)
CO2 SERPL-SCNC: 17 MMOL/L (ref 21–32)
COLOR UR: YELLOW
CREAT SERPL-MCNC: 0.49 MG/DL (ref 0.5–1.05)
EGFRCR SERPLBLD CKD-EPI 2021: >90 ML/MIN/1.73M*2
EOSINOPHIL # BLD AUTO: 0.01 X10*3/UL (ref 0–0.7)
EOSINOPHIL NFR BLD AUTO: 0.1 %
ERYTHROCYTE [DISTWIDTH] IN BLOOD BY AUTOMATED COUNT: 11.8 % (ref 11.5–14.5)
GLUCOSE SERPL-MCNC: 133 MG/DL (ref 74–99)
GLUCOSE UR STRIP.AUTO-MCNC: ABNORMAL MG/DL
HCT VFR BLD AUTO: 41.4 % (ref 36–46)
HGB BLD-MCNC: 14.6 G/DL (ref 12–16)
IMM GRANULOCYTES # BLD AUTO: 0.06 X10*3/UL (ref 0–0.7)
IMM GRANULOCYTES NFR BLD AUTO: 0.4 % (ref 0–0.9)
KETONES UR STRIP.AUTO-MCNC: ABNORMAL MG/DL
LEUKOCYTE ESTERASE UR QL STRIP.AUTO: NEGATIVE
LIPASE SERPL-CCNC: 16 U/L (ref 9–82)
LYMPHOCYTES # BLD AUTO: 1.17 X10*3/UL (ref 1.2–4.8)
LYMPHOCYTES NFR BLD AUTO: 7.4 %
MCH RBC QN AUTO: 30.7 PG (ref 26–34)
MCHC RBC AUTO-ENTMCNC: 35.3 G/DL (ref 32–36)
MCV RBC AUTO: 87 FL (ref 80–100)
MONOCYTES # BLD AUTO: 0.35 X10*3/UL (ref 0.1–1)
MONOCYTES NFR BLD AUTO: 2.2 %
NEUTROPHILS # BLD AUTO: 14.19 X10*3/UL (ref 1.2–7.7)
NEUTROPHILS NFR BLD AUTO: 89.6 %
NITRITE UR QL STRIP.AUTO: NEGATIVE
NRBC BLD-RTO: 0 /100 WBCS (ref 0–0)
PH UR STRIP.AUTO: 6.5 [PH]
PLATELET # BLD AUTO: 318 X10*3/UL (ref 150–450)
POTASSIUM SERPL-SCNC: 3.6 MMOL/L (ref 3.5–5.3)
PROT SERPL-MCNC: 7.1 G/DL (ref 6.4–8.2)
PROT UR STRIP.AUTO-MCNC: NEGATIVE MG/DL
RBC # BLD AUTO: 4.75 X10*6/UL (ref 4–5.2)
RBC # UR STRIP.AUTO: NEGATIVE MG/DL
SODIUM SERPL-SCNC: 134 MMOL/L (ref 136–145)
SP GR UR STRIP.AUTO: 1.02
UROBILINOGEN UR STRIP.AUTO-MCNC: NORMAL MG/DL
WBC # BLD AUTO: 15.8 X10*3/UL (ref 4.4–11.3)

## 2025-02-16 PROCEDURE — 96375 TX/PRO/DX INJ NEW DRUG ADDON: CPT | Performed by: EMERGENCY MEDICINE

## 2025-02-16 PROCEDURE — 84702 CHORIONIC GONADOTROPIN TEST: CPT | Performed by: NURSE PRACTITIONER

## 2025-02-16 PROCEDURE — 85025 COMPLETE CBC W/AUTO DIFF WBC: CPT | Performed by: NURSE PRACTITIONER

## 2025-02-16 PROCEDURE — 2500000004 HC RX 250 GENERAL PHARMACY W/ HCPCS (ALT 636 FOR OP/ED): Performed by: NURSE PRACTITIONER

## 2025-02-16 PROCEDURE — 96361 HYDRATE IV INFUSION ADD-ON: CPT | Performed by: EMERGENCY MEDICINE

## 2025-02-16 PROCEDURE — 81003 URINALYSIS AUTO W/O SCOPE: CPT | Performed by: NURSE PRACTITIONER

## 2025-02-16 PROCEDURE — 80307 DRUG TEST PRSMV CHEM ANLYZR: CPT | Performed by: NURSE PRACTITIONER

## 2025-02-16 PROCEDURE — 96374 THER/PROPH/DIAG INJ IV PUSH: CPT | Performed by: EMERGENCY MEDICINE

## 2025-02-16 PROCEDURE — 83690 ASSAY OF LIPASE: CPT | Performed by: NURSE PRACTITIONER

## 2025-02-16 PROCEDURE — 80143 DRUG ASSAY ACETAMINOPHEN: CPT | Performed by: EMERGENCY MEDICINE

## 2025-02-16 PROCEDURE — 99285 EMERGENCY DEPT VISIT HI MDM: CPT | Performed by: EMERGENCY MEDICINE

## 2025-02-16 PROCEDURE — 80053 COMPREHEN METABOLIC PANEL: CPT | Performed by: NURSE PRACTITIONER

## 2025-02-16 PROCEDURE — 36415 COLL VENOUS BLD VENIPUNCTURE: CPT | Performed by: NURSE PRACTITIONER

## 2025-02-16 RX ORDER — ONDANSETRON HYDROCHLORIDE 2 MG/ML
4 INJECTION, SOLUTION INTRAVENOUS ONCE
Status: COMPLETED | OUTPATIENT
Start: 2025-02-16 | End: 2025-02-16

## 2025-02-16 RX ORDER — FAMOTIDINE 10 MG/ML
20 INJECTION, SOLUTION INTRAVENOUS ONCE
Status: COMPLETED | OUTPATIENT
Start: 2025-02-16 | End: 2025-02-16

## 2025-02-16 RX ADMIN — ONDANSETRON 4 MG: 2 INJECTION, SOLUTION INTRAMUSCULAR; INTRAVENOUS at 22:07

## 2025-02-16 RX ADMIN — FAMOTIDINE 20 MG: 10 INJECTION, SOLUTION INTRAVENOUS at 22:07

## 2025-02-16 RX ADMIN — SODIUM CHLORIDE 1000 ML: 900 INJECTION, SOLUTION INTRAVENOUS at 21:55

## 2025-02-16 RX ADMIN — SODIUM CHLORIDE, SODIUM LACTATE, POTASSIUM CHLORIDE, AND CALCIUM CHLORIDE 1000 ML: 600; 310; 30; 20 INJECTION, SOLUTION INTRAVENOUS at 22:08

## 2025-02-16 ASSESSMENT — LIFESTYLE VARIABLES
TOTAL SCORE: 0
HAVE YOU EVER FELT YOU SHOULD CUT DOWN ON YOUR DRINKING: NO
EVER FELT BAD OR GUILTY ABOUT YOUR DRINKING: NO
HAVE PEOPLE ANNOYED YOU BY CRITICIZING YOUR DRINKING: NO
EVER HAD A DRINK FIRST THING IN THE MORNING TO STEADY YOUR NERVES TO GET RID OF A HANGOVER: NO

## 2025-02-16 ASSESSMENT — COLUMBIA-SUICIDE SEVERITY RATING SCALE - C-SSRS
6. HAVE YOU EVER DONE ANYTHING, STARTED TO DO ANYTHING, OR PREPARED TO DO ANYTHING TO END YOUR LIFE?: NO
2. HAVE YOU ACTUALLY HAD ANY THOUGHTS OF KILLING YOURSELF?: NO
1. IN THE PAST MONTH, HAVE YOU WISHED YOU WERE DEAD OR WISHED YOU COULD GO TO SLEEP AND NOT WAKE UP?: NO

## 2025-02-16 ASSESSMENT — PAIN SCALES - GENERAL: PAINLEVEL_OUTOF10: 10 - WORST POSSIBLE PAIN

## 2025-02-16 ASSESSMENT — PAIN - FUNCTIONAL ASSESSMENT: PAIN_FUNCTIONAL_ASSESSMENT: 0-10

## 2025-02-16 ASSESSMENT — PAIN DESCRIPTION - PAIN TYPE: TYPE: ACUTE PAIN

## 2025-02-17 ENCOUNTER — HOSPITAL ENCOUNTER (OUTPATIENT)
Facility: HOSPITAL | Age: 30
Discharge: OTHER INSTITUTION | End: 2025-02-17
Attending: OBSTETRICS & GYNECOLOGY | Admitting: OBSTETRICS & GYNECOLOGY
Payer: COMMERCIAL

## 2025-02-17 ENCOUNTER — HOSPITAL ENCOUNTER (INPATIENT)
Facility: HOSPITAL | Age: 30
LOS: 2 days | Discharge: HOME | End: 2025-02-19
Attending: STUDENT IN AN ORGANIZED HEALTH CARE EDUCATION/TRAINING PROGRAM | Admitting: OBSTETRICS & GYNECOLOGY
Payer: COMMERCIAL

## 2025-02-17 ENCOUNTER — HOSPITAL ENCOUNTER (OUTPATIENT)
Facility: HOSPITAL | Age: 30
End: 2025-02-17
Attending: OBSTETRICS & GYNECOLOGY | Admitting: OBSTETRICS & GYNECOLOGY
Payer: COMMERCIAL

## 2025-02-17 VITALS
HEIGHT: 67 IN | HEART RATE: 100 BPM | BODY MASS INDEX: 26.68 KG/M2 | OXYGEN SATURATION: 98 % | TEMPERATURE: 99.1 F | RESPIRATION RATE: 16 BRPM | WEIGHT: 169.97 LBS | DIASTOLIC BLOOD PRESSURE: 82 MMHG | SYSTOLIC BLOOD PRESSURE: 131 MMHG

## 2025-02-17 DIAGNOSIS — F11.91 OPIOID USE DISORDER IN REMISSION: ICD-10-CM

## 2025-02-17 DIAGNOSIS — Z32.01 PREGNANCY TEST POSITIVE (HHS-HCC): ICD-10-CM

## 2025-02-17 DIAGNOSIS — Z72.0 TOBACCO USE: ICD-10-CM

## 2025-02-17 DIAGNOSIS — R68.89 WITHDRAWAL COMPLAINT: ICD-10-CM

## 2025-02-17 DIAGNOSIS — O21.9 NAUSEA AND VOMITING IN PREGNANCY: Primary | ICD-10-CM

## 2025-02-17 DIAGNOSIS — F10.11 ALCOHOL ABUSE, IN REMISSION: Chronic | ICD-10-CM

## 2025-02-17 DIAGNOSIS — R10.84 GENERALIZED ABDOMINAL PAIN: ICD-10-CM

## 2025-02-17 DIAGNOSIS — O21.0 HYPEREMESIS GRAVIDARUM (HHS-HCC): ICD-10-CM

## 2025-02-17 DIAGNOSIS — Z59.41 FOOD INSECURITY: ICD-10-CM

## 2025-02-17 DIAGNOSIS — F12.90 MARIJUANA USE: ICD-10-CM

## 2025-02-17 PROBLEM — K29.70 GASTRITIS: Status: ACTIVE | Noted: 2025-02-17

## 2025-02-17 LAB
AMPHETAMINES UR QL SCN: ABNORMAL
APAP SERPL-MCNC: <10 UG/ML
BARBITURATES UR QL SCN: ABNORMAL
BENZODIAZ UR QL SCN: ABNORMAL
BZE UR QL SCN: ABNORMAL
CANNABINOIDS UR QL SCN: ABNORMAL
ETHANOL SERPL-MCNC: <10 MG/DL
FENTANYL+NORFENTANYL UR QL SCN: ABNORMAL
HOLD SPECIMEN: NORMAL
METHADONE UR QL SCN: ABNORMAL
OPIATES UR QL SCN: ABNORMAL
OXYCODONE+OXYMORPHONE UR QL SCN: ABNORMAL
PCP UR QL SCN: ABNORMAL
SALICYLATES SERPL-MCNC: <3 MG/DL

## 2025-02-17 PROCEDURE — 2500000004 HC RX 250 GENERAL PHARMACY W/ HCPCS (ALT 636 FOR OP/ED): Mod: JZ | Performed by: STUDENT IN AN ORGANIZED HEALTH CARE EDUCATION/TRAINING PROGRAM

## 2025-02-17 PROCEDURE — 2500000001 HC RX 250 WO HCPCS SELF ADMINISTERED DRUGS (ALT 637 FOR MEDICARE OP): Performed by: OBSTETRICS & GYNECOLOGY

## 2025-02-17 PROCEDURE — 36415 COLL VENOUS BLD VENIPUNCTURE: CPT

## 2025-02-17 PROCEDURE — 2500000004 HC RX 250 GENERAL PHARMACY W/ HCPCS (ALT 636 FOR OP/ED): Mod: SE

## 2025-02-17 PROCEDURE — 2500000004 HC RX 250 GENERAL PHARMACY W/ HCPCS (ALT 636 FOR OP/ED): Performed by: NURSE PRACTITIONER

## 2025-02-17 PROCEDURE — 99222 1ST HOSP IP/OBS MODERATE 55: CPT | Performed by: OBSTETRICS & GYNECOLOGY

## 2025-02-17 PROCEDURE — 99223 1ST HOSP IP/OBS HIGH 75: CPT

## 2025-02-17 PROCEDURE — 2500000002 HC RX 250 W HCPCS SELF ADMINISTERED DRUGS (ALT 637 FOR MEDICARE OP, ALT 636 FOR OP/ED): Mod: SE

## 2025-02-17 PROCEDURE — 80048 BASIC METABOLIC PNL TOTAL CA: CPT

## 2025-02-17 PROCEDURE — 99285 EMERGENCY DEPT VISIT HI MDM: CPT | Performed by: STUDENT IN AN ORGANIZED HEALTH CARE EDUCATION/TRAINING PROGRAM

## 2025-02-17 PROCEDURE — 99418 PROLNG IP/OBS E/M EA 15 MIN: CPT

## 2025-02-17 PROCEDURE — 96376 TX/PRO/DX INJ SAME DRUG ADON: CPT | Performed by: EMERGENCY MEDICINE

## 2025-02-17 PROCEDURE — 1210000001 HC SEMI-PRIVATE ROOM DAILY

## 2025-02-17 PROCEDURE — 96375 TX/PRO/DX INJ NEW DRUG ADDON: CPT | Performed by: EMERGENCY MEDICINE

## 2025-02-17 PROCEDURE — 99254 IP/OBS CNSLTJ NEW/EST MOD 60: CPT | Performed by: NURSE PRACTITIONER

## 2025-02-17 PROCEDURE — 2500000005 HC RX 250 GENERAL PHARMACY W/O HCPCS: Performed by: OBSTETRICS & GYNECOLOGY

## 2025-02-17 PROCEDURE — 2500000002 HC RX 250 W HCPCS SELF ADMINISTERED DRUGS (ALT 637 FOR MEDICARE OP, ALT 636 FOR OP/ED): Performed by: OBSTETRICS & GYNECOLOGY

## 2025-02-17 PROCEDURE — 86901 BLOOD TYPING SEROLOGIC RH(D): CPT

## 2025-02-17 PROCEDURE — 2500000004 HC RX 250 GENERAL PHARMACY W/ HCPCS (ALT 636 FOR OP/ED): Performed by: OBSTETRICS & GYNECOLOGY

## 2025-02-17 PROCEDURE — S4991 NICOTINE PATCH NONLEGEND: HCPCS | Performed by: OBSTETRICS & GYNECOLOGY

## 2025-02-17 RX ORDER — THIAMINE HYDROCHLORIDE 100 MG/ML
100 INJECTION, SOLUTION INTRAMUSCULAR; INTRAVENOUS DAILY
Status: DISCONTINUED | OUTPATIENT
Start: 2025-02-18 | End: 2025-02-17

## 2025-02-17 RX ORDER — DOCUSATE SODIUM 100 MG/1
100 CAPSULE, LIQUID FILLED ORAL DAILY
Status: DISCONTINUED | OUTPATIENT
Start: 2025-02-17 | End: 2025-02-17 | Stop reason: HOSPADM

## 2025-02-17 RX ORDER — THIAMINE HYDROCHLORIDE 100 MG/ML
100 INJECTION, SOLUTION INTRAMUSCULAR; INTRAVENOUS DAILY
Status: DISCONTINUED | OUTPATIENT
Start: 2025-02-17 | End: 2025-02-18

## 2025-02-17 RX ORDER — SCOPOLAMINE 1 MG/3D
1 PATCH, EXTENDED RELEASE TRANSDERMAL
Status: DISCONTINUED | OUTPATIENT
Start: 2025-02-17 | End: 2025-02-19 | Stop reason: HOSPADM

## 2025-02-17 RX ORDER — LANOLIN ALCOHOL/MO/W.PET/CERES
25 CREAM (GRAM) TOPICAL EVERY 8 HOURS
Status: DISCONTINUED | OUTPATIENT
Start: 2025-02-17 | End: 2025-02-17 | Stop reason: HOSPADM

## 2025-02-17 RX ORDER — METHADONE HYDROCHLORIDE 5 MG/1
5 TABLET ORAL
Status: DISCONTINUED | OUTPATIENT
Start: 2025-02-17 | End: 2025-02-17 | Stop reason: SDUPTHER

## 2025-02-17 RX ORDER — ONDANSETRON 4 MG/1
4 TABLET, FILM COATED ORAL EVERY 6 HOURS PRN
Status: DISCONTINUED | OUTPATIENT
Start: 2025-02-17 | End: 2025-02-17

## 2025-02-17 RX ORDER — ONDANSETRON HYDROCHLORIDE 2 MG/ML
4 INJECTION, SOLUTION INTRAVENOUS EVERY 8 HOURS PRN
Status: DISCONTINUED | OUTPATIENT
Start: 2025-02-17 | End: 2025-02-17 | Stop reason: HOSPADM

## 2025-02-17 RX ORDER — BUPRENORPHINE HYDROCHLORIDE 8 MG/1
8 TABLET SUBLINGUAL ONCE
Status: DISCONTINUED | OUTPATIENT
Start: 2025-02-19 | End: 2025-02-18

## 2025-02-17 RX ORDER — DIPHENHYDRAMINE HYDROCHLORIDE 50 MG/ML
25 INJECTION INTRAMUSCULAR; INTRAVENOUS ONCE
Status: COMPLETED | OUTPATIENT
Start: 2025-02-17 | End: 2025-02-17

## 2025-02-17 RX ORDER — DEXTROSE, SODIUM CHLORIDE, SODIUM LACTATE, POTASSIUM CHLORIDE, AND CALCIUM CHLORIDE 5; .6; .31; .03; .02 G/100ML; G/100ML; G/100ML; G/100ML; G/100ML
75 INJECTION, SOLUTION INTRAVENOUS CONTINUOUS
Status: DISCONTINUED | OUTPATIENT
Start: 2025-02-17 | End: 2025-02-17

## 2025-02-17 RX ORDER — BUPRENORPHINE 2 MG/1
2 TABLET SUBLINGUAL
Status: DISCONTINUED | OUTPATIENT
Start: 2025-02-18 | End: 2025-02-19 | Stop reason: HOSPADM

## 2025-02-17 RX ORDER — METOCLOPRAMIDE 10 MG/1
10 TABLET ORAL EVERY 8 HOURS SCHEDULED
Status: DISCONTINUED | OUTPATIENT
Start: 2025-02-17 | End: 2025-02-17

## 2025-02-17 RX ORDER — ACETAMINOPHEN 325 MG/1
975 TABLET ORAL EVERY 6 HOURS PRN
Status: DISCONTINUED | OUTPATIENT
Start: 2025-02-17 | End: 2025-02-17 | Stop reason: HOSPADM

## 2025-02-17 RX ORDER — METHADONE HYDROCHLORIDE 5 MG/1
5 TABLET ORAL
Status: DISCONTINUED | OUTPATIENT
Start: 2025-02-17 | End: 2025-02-17

## 2025-02-17 RX ORDER — METOCLOPRAMIDE HYDROCHLORIDE 5 MG/ML
10 INJECTION INTRAMUSCULAR; INTRAVENOUS EVERY 8 HOURS SCHEDULED
Status: DISCONTINUED | OUTPATIENT
Start: 2025-02-17 | End: 2025-02-17

## 2025-02-17 RX ORDER — IBUPROFEN 200 MG
1 TABLET ORAL DAILY
Status: DISCONTINUED | OUTPATIENT
Start: 2025-02-17 | End: 2025-02-17 | Stop reason: HOSPADM

## 2025-02-17 RX ORDER — ONDANSETRON HYDROCHLORIDE 2 MG/ML
4 INJECTION, SOLUTION INTRAVENOUS ONCE
Status: COMPLETED | OUTPATIENT
Start: 2025-02-17 | End: 2025-02-17

## 2025-02-17 RX ORDER — METHADONE HYDROCHLORIDE 5 MG/1
5 TABLET ORAL ONCE
Status: DISCONTINUED | OUTPATIENT
Start: 2025-02-19 | End: 2025-02-17

## 2025-02-17 RX ORDER — BUPRENORPHINE 2 MG/1
2 TABLET SUBLINGUAL ONCE
Status: COMPLETED | OUTPATIENT
Start: 2025-02-17 | End: 2025-02-17

## 2025-02-17 RX ORDER — METHADONE HYDROCHLORIDE 5 MG/1
5 TABLET ORAL
Status: DISCONTINUED | OUTPATIENT
Start: 2025-02-19 | End: 2025-02-17

## 2025-02-17 RX ORDER — ADHESIVE BANDAGE
10 BANDAGE TOPICAL
Status: DISCONTINUED | OUTPATIENT
Start: 2025-02-17 | End: 2025-02-19 | Stop reason: HOSPADM

## 2025-02-17 RX ORDER — ONDANSETRON 4 MG/1
4 TABLET, FILM COATED ORAL EVERY 8 HOURS PRN
Status: DISCONTINUED | OUTPATIENT
Start: 2025-02-17 | End: 2025-02-17

## 2025-02-17 RX ORDER — CAFFEINE 200 MG
200 TABLET ORAL DAILY PRN
Status: DISCONTINUED | OUTPATIENT
Start: 2025-02-17 | End: 2025-02-17

## 2025-02-17 RX ORDER — LIDOCAINE 560 MG/1
1 PATCH PERCUTANEOUS; TOPICAL; TRANSDERMAL ONCE
Status: COMPLETED | OUTPATIENT
Start: 2025-02-17 | End: 2025-02-17

## 2025-02-17 RX ORDER — BISACODYL 10 MG/1
10 SUPPOSITORY RECTAL ONCE
Status: DISCONTINUED | OUTPATIENT
Start: 2025-02-17 | End: 2025-02-17 | Stop reason: HOSPADM

## 2025-02-17 RX ORDER — DIPHENHYDRAMINE HCL 25 MG
25 TABLET ORAL ONCE
Status: COMPLETED | OUTPATIENT
Start: 2025-02-17 | End: 2025-02-17

## 2025-02-17 RX ORDER — CALCIUM CARBONATE 200(500)MG
500 TABLET,CHEWABLE ORAL 4 TIMES DAILY PRN
Status: DISCONTINUED | OUTPATIENT
Start: 2025-02-17 | End: 2025-02-17 | Stop reason: HOSPADM

## 2025-02-17 RX ORDER — ONDANSETRON HYDROCHLORIDE 2 MG/ML
4 INJECTION, SOLUTION INTRAVENOUS EVERY 6 HOURS PRN
Status: DISCONTINUED | OUTPATIENT
Start: 2025-02-17 | End: 2025-02-18

## 2025-02-17 RX ORDER — PANTOPRAZOLE SODIUM 40 MG/10ML
40 INJECTION, POWDER, LYOPHILIZED, FOR SOLUTION INTRAVENOUS DAILY
Status: DISCONTINUED | OUTPATIENT
Start: 2025-02-18 | End: 2025-02-18

## 2025-02-17 RX ORDER — HYDRALAZINE HYDROCHLORIDE 20 MG/ML
5 INJECTION INTRAMUSCULAR; INTRAVENOUS ONCE AS NEEDED
Status: DISCONTINUED | OUTPATIENT
Start: 2025-02-17 | End: 2025-02-19 | Stop reason: HOSPADM

## 2025-02-17 RX ORDER — LIDOCAINE HYDROCHLORIDE 10 MG/ML
0.5 INJECTION, SOLUTION EPIDURAL; INFILTRATION; INTRACAUDAL; PERINEURAL ONCE AS NEEDED
Status: DISCONTINUED | OUTPATIENT
Start: 2025-02-17 | End: 2025-02-17 | Stop reason: HOSPADM

## 2025-02-17 RX ORDER — METOCLOPRAMIDE HYDROCHLORIDE 5 MG/ML
10 INJECTION INTRAMUSCULAR; INTRAVENOUS EVERY 6 HOURS SCHEDULED
Status: DISCONTINUED | OUTPATIENT
Start: 2025-02-18 | End: 2025-02-18

## 2025-02-17 RX ORDER — LIDOCAINE HYDROCHLORIDE 10 MG/ML
0.5 INJECTION, SOLUTION INFILTRATION; PERINEURAL ONCE AS NEEDED
Status: DISCONTINUED | OUTPATIENT
Start: 2025-02-17 | End: 2025-02-19 | Stop reason: HOSPADM

## 2025-02-17 RX ORDER — SIMETHICONE 80 MG
80 TABLET,CHEWABLE ORAL 4 TIMES DAILY PRN
Status: DISCONTINUED | OUTPATIENT
Start: 2025-02-17 | End: 2025-02-19 | Stop reason: HOSPADM

## 2025-02-17 RX ORDER — PANTOPRAZOLE SODIUM 40 MG/10ML
40 INJECTION, POWDER, LYOPHILIZED, FOR SOLUTION INTRAVENOUS DAILY
Status: DISCONTINUED | OUTPATIENT
Start: 2025-02-17 | End: 2025-02-17 | Stop reason: HOSPADM

## 2025-02-17 RX ORDER — LABETALOL HYDROCHLORIDE 5 MG/ML
20 INJECTION, SOLUTION INTRAVENOUS ONCE AS NEEDED
Status: DISCONTINUED | OUTPATIENT
Start: 2025-02-17 | End: 2025-02-19 | Stop reason: HOSPADM

## 2025-02-17 RX ORDER — CITALOPRAM 40 MG/1
40 TABLET, FILM COATED ORAL DAILY
Status: DISCONTINUED | OUTPATIENT
Start: 2025-02-17 | End: 2025-02-17 | Stop reason: HOSPADM

## 2025-02-17 RX ORDER — METHADONE HYDROCHLORIDE 10 MG/1
10 TABLET ORAL ONCE
Status: DISCONTINUED | OUTPATIENT
Start: 2025-02-17 | End: 2025-02-17

## 2025-02-17 RX ORDER — FOLIC ACID 0.4 MG
0.8 TABLET ORAL DAILY
Status: DISCONTINUED | OUTPATIENT
Start: 2025-02-17 | End: 2025-02-17 | Stop reason: HOSPADM

## 2025-02-17 RX ORDER — NIFEDIPINE 10 MG/1
10 CAPSULE ORAL ONCE AS NEEDED
Status: DISCONTINUED | OUTPATIENT
Start: 2025-02-17 | End: 2025-02-19 | Stop reason: HOSPADM

## 2025-02-17 RX ORDER — ACETAMINOPHEN 650 MG/1
650 SUPPOSITORY RECTAL EVERY 6 HOURS PRN
Status: DISCONTINUED | OUTPATIENT
Start: 2025-02-17 | End: 2025-02-17 | Stop reason: HOSPADM

## 2025-02-17 RX ORDER — DEXTROSE, SODIUM CHLORIDE, SODIUM LACTATE, POTASSIUM CHLORIDE, AND CALCIUM CHLORIDE 5; .6; .31; .03; .02 G/100ML; G/100ML; G/100ML; G/100ML; G/100ML
175 INJECTION, SOLUTION INTRAVENOUS CONTINUOUS
Status: DISCONTINUED | OUTPATIENT
Start: 2025-02-17 | End: 2025-02-17 | Stop reason: HOSPADM

## 2025-02-17 RX ORDER — ALBUTEROL SULFATE 90 UG/1
2 INHALANT RESPIRATORY (INHALATION) EVERY 4 HOURS PRN
Status: DISCONTINUED | OUTPATIENT
Start: 2025-02-17 | End: 2025-02-17 | Stop reason: HOSPADM

## 2025-02-17 RX ORDER — POLYETHYLENE GLYCOL 3350 17 G/17G
17 POWDER, FOR SOLUTION ORAL 2 TIMES DAILY PRN
Status: DISCONTINUED | OUTPATIENT
Start: 2025-02-17 | End: 2025-02-19 | Stop reason: HOSPADM

## 2025-02-17 RX ORDER — LIDOCAINE 560 MG/1
1 PATCH PERCUTANEOUS; TOPICAL; TRANSDERMAL
Status: DISCONTINUED | OUTPATIENT
Start: 2025-02-17 | End: 2025-02-19 | Stop reason: HOSPADM

## 2025-02-17 RX ORDER — BUPRENORPHINE 2 MG/1
2 TABLET SUBLINGUAL
Status: DISCONTINUED | OUTPATIENT
Start: 2025-02-17 | End: 2025-02-19 | Stop reason: HOSPADM

## 2025-02-17 RX ORDER — ONDANSETRON 4 MG/1
4 TABLET, ORALLY DISINTEGRATING ORAL EVERY 8 HOURS PRN
Status: DISCONTINUED | OUTPATIENT
Start: 2025-02-17 | End: 2025-02-17 | Stop reason: HOSPADM

## 2025-02-17 RX ORDER — PROCHLORPERAZINE MALEATE 10 MG
10 TABLET ORAL EVERY 6 HOURS PRN
Status: DISCONTINUED | OUTPATIENT
Start: 2025-02-17 | End: 2025-02-17

## 2025-02-17 RX ORDER — DEXTROSE, SODIUM CHLORIDE, SODIUM LACTATE, POTASSIUM CHLORIDE, AND CALCIUM CHLORIDE 5; .6; .31; .03; .02 G/100ML; G/100ML; G/100ML; G/100ML; G/100ML
125 INJECTION, SOLUTION INTRAVENOUS CONTINUOUS
Status: DISCONTINUED | OUTPATIENT
Start: 2025-02-17 | End: 2025-02-18

## 2025-02-17 RX ORDER — METHADONE HYDROCHLORIDE 5 MG/1
5 TABLET ORAL
Status: DISCONTINUED | OUTPATIENT
Start: 2025-02-18 | End: 2025-02-17

## 2025-02-17 RX ORDER — METHADONE HYDROCHLORIDE 5 MG/1
5 TABLET ORAL ONCE
Status: DISCONTINUED | OUTPATIENT
Start: 2025-02-18 | End: 2025-02-17

## 2025-02-17 RX ORDER — METOCLOPRAMIDE HYDROCHLORIDE 5 MG/ML
10 INJECTION INTRAMUSCULAR; INTRAVENOUS ONCE
Status: COMPLETED | OUTPATIENT
Start: 2025-02-17 | End: 2025-02-17

## 2025-02-17 RX ORDER — ONDANSETRON HYDROCHLORIDE 2 MG/ML
4 INJECTION, SOLUTION INTRAVENOUS EVERY 6 HOURS
Status: DISCONTINUED | OUTPATIENT
Start: 2025-02-17 | End: 2025-02-18

## 2025-02-17 RX ORDER — PROCHLORPERAZINE 25 MG/1
25 SUPPOSITORY RECTAL EVERY 12 HOURS PRN
Status: DISCONTINUED | OUTPATIENT
Start: 2025-02-17 | End: 2025-02-18

## 2025-02-17 RX ORDER — CAPSAICIN 0.75 MG/G
CREAM TOPICAL 2 TIMES DAILY
Status: DISCONTINUED | OUTPATIENT
Start: 2025-02-17 | End: 2025-02-19 | Stop reason: HOSPADM

## 2025-02-17 RX ORDER — DIPHENHYDRAMINE HYDROCHLORIDE 50 MG/ML
25 INJECTION INTRAMUSCULAR; INTRAVENOUS EVERY 6 HOURS
Status: DISCONTINUED | OUTPATIENT
Start: 2025-02-17 | End: 2025-02-18

## 2025-02-17 RX ORDER — BUPRENORPHINE 2 MG/1
2 TABLET SUBLINGUAL
Status: DISCONTINUED | OUTPATIENT
Start: 2025-02-19 | End: 2025-02-19 | Stop reason: HOSPADM

## 2025-02-17 RX ORDER — METHADONE HYDROCHLORIDE 10 MG/1
20 TABLET ORAL ONCE
Status: DISCONTINUED | OUTPATIENT
Start: 2025-02-17 | End: 2025-02-17

## 2025-02-17 RX ORDER — BUPRENORPHINE HYDROCHLORIDE 8 MG/1
8 TABLET SUBLINGUAL ONCE
Status: DISCONTINUED | OUTPATIENT
Start: 2025-02-18 | End: 2025-02-18

## 2025-02-17 RX ORDER — PROCHLORPERAZINE EDISYLATE 5 MG/ML
10 INJECTION INTRAMUSCULAR; INTRAVENOUS EVERY 6 HOURS PRN
Status: DISCONTINUED | OUTPATIENT
Start: 2025-02-17 | End: 2025-02-18

## 2025-02-17 RX ADMIN — DIPHENHYDRAMINE HYDROCHLORIDE 25 MG: 50 INJECTION, SOLUTION INTRAMUSCULAR; INTRAVENOUS at 05:24

## 2025-02-17 RX ADMIN — DOCUSATE SODIUM 100 MG: 100 CAPSULE, LIQUID FILLED ORAL at 08:55

## 2025-02-17 RX ADMIN — NICOTINE 1 PATCH: 14 PATCH, EXTENDED RELEASE TRANSDERMAL at 13:59

## 2025-02-17 RX ADMIN — DIPHENHYDRAMINE HYDROCHLORIDE 25 MG: 50 INJECTION, SOLUTION INTRAMUSCULAR; INTRAVENOUS at 01:09

## 2025-02-17 RX ADMIN — PYRIDOXINE HYDROCHLORIDE 20 MG: 100 INJECTION, SOLUTION INTRAMUSCULAR; INTRAVENOUS at 05:58

## 2025-02-17 RX ADMIN — METOCLOPRAMIDE 10 MG: 5 INJECTION, SOLUTION INTRAMUSCULAR; INTRAVENOUS at 11:43

## 2025-02-17 RX ADMIN — LIDOCAINE 1 PATCH: 4 PATCH TOPICAL at 05:48

## 2025-02-17 RX ADMIN — CITALOPRAM HYDROBROMIDE 40 MG: 40 TABLET ORAL at 08:56

## 2025-02-17 RX ADMIN — METOCLOPRAMIDE 10 MG: 5 INJECTION, SOLUTION INTRAMUSCULAR; INTRAVENOUS at 23:36

## 2025-02-17 RX ADMIN — THIAMINE HYDROCHLORIDE 100 MG: 100 INJECTION, SOLUTION INTRAMUSCULAR; INTRAVENOUS at 23:36

## 2025-02-17 RX ADMIN — PYRIDOXINE HYDROCHLORIDE 20 MG: 100 INJECTION, SOLUTION INTRAMUSCULAR; INTRAVENOUS at 13:59

## 2025-02-17 RX ADMIN — BUPRENORPHINE 2 MG: 2 TABLET SUBLINGUAL at 22:22

## 2025-02-17 RX ADMIN — ONDANSETRON HYDROCHLORIDE 4 MG: 4 TABLET, FILM COATED ORAL at 22:20

## 2025-02-17 RX ADMIN — SODIUM CHLORIDE, SODIUM LACTATE, POTASSIUM CHLORIDE, CALCIUM CHLORIDE AND DEXTROSE MONOHYDRATE 175 ML/HR: 5; 600; 310; 30; 20 INJECTION, SOLUTION INTRAVENOUS at 05:24

## 2025-02-17 RX ADMIN — FOLIC ACID TAB 400 MCG 0.8 MG: 400 TAB at 14:01

## 2025-02-17 RX ADMIN — METOCLOPRAMIDE 10 MG: 5 INJECTION, SOLUTION INTRAMUSCULAR; INTRAVENOUS at 05:23

## 2025-02-17 RX ADMIN — ONDANSETRON 4 MG: 2 INJECTION INTRAMUSCULAR; INTRAVENOUS at 01:09

## 2025-02-17 RX ADMIN — PANTOPRAZOLE SODIUM 40 MG: 40 INJECTION, POWDER, FOR SOLUTION INTRAVENOUS at 08:56

## 2025-02-17 SDOH — HEALTH STABILITY: MENTAL HEALTH
DO YOU FEEL STRESS - TENSE, RESTLESS, NERVOUS, OR ANXIOUS, OR UNABLE TO SLEEP AT NIGHT BECAUSE YOUR MIND IS TROUBLED ALL THE TIME - THESE DAYS?: NOT AT ALL

## 2025-02-17 SDOH — HEALTH STABILITY: PHYSICAL HEALTH
HOW OFTEN DO YOU NEED TO HAVE SOMEONE HELP YOU WHEN YOU READ INSTRUCTIONS, PAMPHLETS, OR OTHER WRITTEN MATERIAL FROM YOUR DOCTOR OR PHARMACY?: NEVER

## 2025-02-17 SDOH — ECONOMIC STABILITY: FOOD INSECURITY: WITHIN THE PAST 12 MONTHS, YOU WORRIED THAT YOUR FOOD WOULD RUN OUT BEFORE YOU GOT THE MONEY TO BUY MORE.: NEVER TRUE

## 2025-02-17 SDOH — SOCIAL STABILITY: SOCIAL INSECURITY
WITHIN THE LAST YEAR, HAVE YOU BEEN RAPED OR FORCED TO HAVE ANY KIND OF SEXUAL ACTIVITY BY YOUR PARTNER OR EX-PARTNER?: NO

## 2025-02-17 SDOH — SOCIAL STABILITY: SOCIAL INSECURITY
WITHIN THE LAST YEAR, HAVE YOU BEEN KICKED, HIT, SLAPPED, OR OTHERWISE PHYSICALLY HURT BY YOUR PARTNER OR EX-PARTNER?: NO

## 2025-02-17 SDOH — SOCIAL STABILITY: SOCIAL INSECURITY: HAVE YOU HAD ANY THOUGHTS OF HARMING ANYONE ELSE?: NO

## 2025-02-17 SDOH — SOCIAL STABILITY: SOCIAL INSECURITY: DO YOU FEEL ANYONE HAS EXPLOITED OR TAKEN ADVANTAGE OF YOU FINANCIALLY OR OF YOUR PERSONAL PROPERTY?: NO

## 2025-02-17 SDOH — SOCIAL STABILITY: SOCIAL INSECURITY: WITHIN THE LAST YEAR, HAVE YOU BEEN HUMILIATED OR EMOTIONALLY ABUSED IN OTHER WAYS BY YOUR PARTNER OR EX-PARTNER?: NO

## 2025-02-17 SDOH — SOCIAL STABILITY: SOCIAL INSECURITY: WITHIN THE LAST YEAR, HAVE YOU BEEN AFRAID OF YOUR PARTNER OR EX-PARTNER?: NO

## 2025-02-17 SDOH — HEALTH STABILITY: MENTAL HEALTH: HAVE YOU USED ANY SUBSTANCES (CANABIS, COCAINE, HEROIN, HALLUCINOGENS, INHALANTS, ETC.) IN THE PAST 12 MONTHS?: YES

## 2025-02-17 SDOH — SOCIAL STABILITY: SOCIAL INSECURITY: DOES ANYONE TRY TO KEEP YOU FROM HAVING/CONTACTING OTHER FRIENDS OR DOING THINGS OUTSIDE YOUR HOME?: NO

## 2025-02-17 SDOH — HEALTH STABILITY: MENTAL HEALTH: NON-SPECIFIC ACTIVE SUICIDAL THOUGHTS (PAST 1 MONTH): NO

## 2025-02-17 SDOH — SOCIAL STABILITY: SOCIAL INSECURITY: VERBAL ABUSE: DENIES

## 2025-02-17 SDOH — SOCIAL STABILITY: SOCIAL INSECURITY: HAVE YOU HAD THOUGHTS OF HARMING ANYONE ELSE?: NO

## 2025-02-17 SDOH — ECONOMIC STABILITY: FOOD INSECURITY: WITHIN THE PAST 12 MONTHS, THE FOOD YOU BOUGHT JUST DIDN'T LAST AND YOU DIDN'T HAVE MONEY TO GET MORE.: NEVER TRUE

## 2025-02-17 SDOH — SOCIAL STABILITY: SOCIAL INSECURITY: ARE THERE ANY APPARENT SIGNS OF INJURIES/BEHAVIORS THAT COULD BE RELATED TO ABUSE/NEGLECT?: NO

## 2025-02-17 SDOH — ECONOMIC STABILITY: HOUSING INSECURITY: DO YOU FEEL UNSAFE GOING BACK TO THE PLACE WHERE YOU ARE LIVING?: NO

## 2025-02-17 SDOH — HEALTH STABILITY: MENTAL HEALTH: HAVE YOU USED ANY PRESCRIPTION DRUGS OTHER THAN PRESCRIBED IN THE PAST 12 MONTHS?: NO

## 2025-02-17 SDOH — SOCIAL STABILITY: SOCIAL INSECURITY: HAS ANYONE EVER THREATENED TO HURT YOUR FAMILY OR YOUR PETS?: NO

## 2025-02-17 SDOH — ECONOMIC STABILITY - FOOD INSECURITY: FOOD INSECURITY: Z59.41

## 2025-02-17 SDOH — HEALTH STABILITY: MENTAL HEALTH: WISH TO BE DEAD (PAST 1 MONTH): NO

## 2025-02-17 SDOH — SOCIAL STABILITY: SOCIAL INSECURITY: ABUSE SCREEN: ADULT

## 2025-02-17 SDOH — SOCIAL STABILITY: SOCIAL INSECURITY: PHYSICAL ABUSE: DENIES

## 2025-02-17 SDOH — SOCIAL STABILITY: SOCIAL INSECURITY: ARE YOU OR HAVE YOU BEEN THREATENED OR ABUSED PHYSICALLY, EMOTIONALLY, OR SEXUALLY BY ANYONE?: NO

## 2025-02-17 SDOH — HEALTH STABILITY: MENTAL HEALTH: SUICIDAL BEHAVIOR (LIFETIME): NO

## 2025-02-17 SDOH — HEALTH STABILITY: MENTAL HEALTH: STRENGTHS (MUST CHOOSE TWO): SUPPORT FROM FAMILY;SUPPORT FROM FRIENDS

## 2025-02-17 ASSESSMENT — ACTIVITIES OF DAILY LIVING (ADL)
GROOMING: INDEPENDENT
JUDGMENT_ADEQUATE_SAFELY_COMPLETE_DAILY_ACTIVITIES: YES
LACK_OF_TRANSPORTATION: NO
BATHING: INDEPENDENT
ADEQUATE_TO_COMPLETE_ADL: YES
HEARING - LEFT EAR: FUNCTIONAL
TOILETING: INDEPENDENT
WALKS IN HOME: INDEPENDENT
PATIENT'S MEMORY ADEQUATE TO SAFELY COMPLETE DAILY ACTIVITIES?: YES
HEARING - RIGHT EAR: FUNCTIONAL
FEEDING YOURSELF: INDEPENDENT
DRESSING YOURSELF: INDEPENDENT

## 2025-02-17 ASSESSMENT — ENCOUNTER SYMPTOMS
DIZZINESS: 0
DIFFICULTY URINATING: 0
SEIZURES: 0
APNEA: 0
NECK PAIN: 0
SHORTNESS OF BREATH: 0
SORE THROAT: 0
WEAKNESS: 0
ABDOMINAL PAIN: 0
DIAPHORESIS: 0
RHINORRHEA: 0
VOMITING: 1
FEVER: 0
HALLUCINATIONS: 0
CONSTIPATION: 1
BACK PAIN: 0
NAUSEA: 1
COUGH: 0

## 2025-02-17 ASSESSMENT — PAIN SCALES - GENERAL
PAINLEVEL_OUTOF10: 0 - NO PAIN
PAINLEVEL_OUTOF10: 8
PAINLEVEL_OUTOF10: 8

## 2025-02-17 ASSESSMENT — PATIENT HEALTH QUESTIONNAIRE - PHQ9
1. LITTLE INTEREST OR PLEASURE IN DOING THINGS: NOT AT ALL
SUM OF ALL RESPONSES TO PHQ9 QUESTIONS 1 & 2: 0
2. FEELING DOWN, DEPRESSED OR HOPELESS: NOT AT ALL

## 2025-02-17 ASSESSMENT — COLUMBIA-SUICIDE SEVERITY RATING SCALE - C-SSRS
2. HAVE YOU ACTUALLY HAD ANY THOUGHTS OF KILLING YOURSELF?: NO
6. HAVE YOU EVER DONE ANYTHING, STARTED TO DO ANYTHING, OR PREPARED TO DO ANYTHING TO END YOUR LIFE?: NO
1. IN THE PAST MONTH, HAVE YOU WISHED YOU WERE DEAD OR WISHED YOU COULD GO TO SLEEP AND NOT WAKE UP?: NO

## 2025-02-17 ASSESSMENT — LIFESTYLE VARIABLES
HOW OFTEN DO YOU HAVE A DRINK CONTAINING ALCOHOL: NEVER
AUDIT-C TOTAL SCORE: 0
TOTAL_SCORE: 5
HOW MANY STANDARD DRINKS CONTAINING ALCOHOL DO YOU HAVE ON A TYPICAL DAY: PATIENT DOES NOT DRINK
SKIP TO QUESTIONS 9-10: 1
HOW OFTEN DO YOU HAVE 6 OR MORE DRINKS ON ONE OCCASION: NEVER
AUDIT-C TOTAL SCORE: 0
TOTAL_SCORE: 4

## 2025-02-17 ASSESSMENT — PAIN - FUNCTIONAL ASSESSMENT: PAIN_FUNCTIONAL_ASSESSMENT: 0-10

## 2025-02-17 NOTE — PROGRESS NOTES
29 y.o.  at 5w3d by LMP with N/V, somnolence, and agitation (improved) in the setting of UDS +marijuana, barbiturates, and admitted recent Kratom use. Nausea and vomiting likely due to acute withdrawal as opposed to hyperemesis/pregnancy related nausea and vomiting. Patient was evaluated by Social Work, Medicine, and Psychiatry here with recommendations for supportive care. Had an extensive discussion with both Medicine and Psychiatry teams who do not have much experience with managing acute withdrawal and Kratom use. Discussed that the best option for patient would be further evaluation and management at a tertiary care facility with addiction medicine sub-specialists. Patient is declining to go back to [facility]. She is agreeable to transfer to [facility] ER for further evaluation and management with Addiction Medicine and OB/GYN services. Patient voiced understanding and is in agreement with this plan of care.        Plan of care discussed with patient with RN present at bedside. Plan of care for transfer discussed with on call [facility], Dr. Morrison and [facility] ER physician, Dr. Childress who are accepting of patient transfer       Sree Hung MD

## 2025-02-17 NOTE — PROGRESS NOTES
Consult noted. Reviewed chart. MSW gathered resources to provide pt. MSW attempted a visit but both pt and significant other were sleeping and wanted to sleep more. MSW will return at a later time. Positive drug screen for barbiturates and THC. Per chart pt acknowledges THC use. Pt also admits to taking Kratom based on chart review.     1057-Went to see pt again but she had just gotten into the shower. MSW will try again.       Sharlene CARYA, MSW

## 2025-02-17 NOTE — ED PROVIDER NOTES
HPI   Chief Complaint   Patient presents with    Nausea     Patient coming in for nausea/vomiting all week- states this is her 4th time here this week.   States her HCG is up but unsure if pregnant- LMP 1/10/25.       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  Social History     Tobacco Use    Smoking status: Every Day     Current packs/day: 0.50     Types: Cigarettes     Passive exposure: Never    Smokeless tobacco: Never   Vaping Use    Vaping status: Every Day    Substances: Nicotine, THC, CBD, Flavoring    Devices: Disposable, Pre-filled or refillable cartridge   Substance Use Topics    Alcohol use: Not Currently     Comment: States used to be a really bad alcoholic    Drug use: Yes     Types: Marijuana       Physical Exam   ED Triage Vitals [02/16/25 2129]   Temperature Heart Rate Respirations BP   36.6 °C (97.9 °F) 98 (!) 24 (!) 121/102      Pulse Ox Temp Source Heart Rate Source Patient Position   100 % Temporal Monitor Sitting      BP Location FiO2 (%)     Left arm --       Physical Exam  CONSTITUTIONAL: Vital signs reviewed as charted, well-developed and in no distress  Eyes: Extraocular muscles are intact. Pupils equal round and reactive to light. Conjunctiva are pink.    ENT: Mucous membranes are dry. Tongue in the midline. Pharynx was without erythema or exudates, uvula midline  LUNGS: Breath sounds equal and clear to auscultation. Good air exchange, no wheezes rales or retractions, pulse oximetry is charted.  HEART: Regular rate and rhythm without murmur thrill or rub, strong tones, auscultation is normal.  ABDOMEN: Soft and nontender without guarding rebound rigidity or mass. Bowel sounds are present and  normal in all quadrants. There is no palpable masses or aneurysms identified. No hepatosplenomegaly, normal abdominal exam.  Neuro: The patient is awake, alert and oriented ×3. Moving all 4 extremities and answering questions appropriately.   MUSCULOSKELETAL: The calves are nontender to palpation. Full gross active range of motion.   PSYCH: Awake alert oriented, normal mood and affect.  Skin:  Dry, normal color, warm to the touch, no rash present.        ED Course & MDM   Diagnoses as of 02/19/25 0817   Nausea and vomiting, unspecified vomiting type                 No data recorded     [place] Coma Scale Score: 15 (02/16/25 2131 : Leandra Yo RN)                           Medical Decision Making  History obtained from: patient    Vital signs, nursing notes, current medications, past medical history, Surgical history, allergies, social history, family History were reviewed.         HPI:  Patient 29-year-old female present emergency room today complaining of nausea and vomiting and dehydration.  She currently being monitored for a positive pregnancy test seems to be her hCG hormone is rising appropriately yesterday was around 800.  States she has been vomiting for nearly 10 days straight and is severely dehydrated.  She is very anxious and hyperventilating in the room.  She does have dry mucous membranes.  Does admit history of alcohol abuse in the past, does smoke marijuana.      10 point ROS was reviewed and negative except Noted above in HPI.  DDX: as listed above          MDM Summary/considerations:  Labs Reviewed   COMPREHENSIVE METABOLIC PANEL - Abnormal       Result Value    Glucose 133 (*)     Sodium 134 (*)     Potassium 3.6      Chloride 105      Bicarbonate 17 (*)     Anion Gap 16      Urea Nitrogen 7      Creatinine 0.49 (*)     eGFR >90      Calcium 9.3      Albumin 4.3      Alkaline Phosphatase 59      Total Protein 7.1      AST 14      Bilirubin, Total 0.6      ALT 29     CBC WITH AUTO DIFFERENTIAL  - Abnormal    WBC 15.8 (*)     nRBC 0.0      RBC 4.75      Hemoglobin 14.6      Hematocrit 41.4      MCV 87      MCH 30.7      MCHC 35.3      RDW 11.8      Platelets 318      Neutrophils % 89.6      Immature Granulocytes %, Automated 0.4      Lymphocytes % 7.4      Monocytes % 2.2      Eosinophils % 0.1      Basophils % 0.3      Neutrophils Absolute 14.19 (*)     Immature Granulocytes Absolute, Automated 0.06      Lymphocytes Absolute 1.17 (*)     Monocytes Absolute 0.35      Eosinophils Absolute 0.01      Basophils Absolute 0.04     HUMAN CHORIONIC GONADOTROPIN, SERUM QUANTITATIVE - Abnormal    HCG, Beta-Quantitative 1,829 (*)     Narrative:      Total HCG measurement is performed using the [employer] Access   Immunoassay which detects intact HCG and free beta HCG subunit.    This test is not indicated for use as a tumor marker.   HCG testing is performed using a different test methodology at [facility] than other [facility]. Direct result comparison   should only be made within the same method.       URINALYSIS WITH REFLEX CULTURE AND MICROSCOPIC - Abnormal    Color, Urine Yellow      Appearance, Urine Clear      Specific Gravity, Urine 1.022      pH, Urine 6.5      Protein, Urine NEGATIVE      Glucose, Urine 50 (TRACE) (*)     Blood, Urine NEGATIVE      Ketones, Urine 80 (3+) (*)     Bilirubin, Urine NEGATIVE      Urobilinogen, Urine Normal      Nitrite, Urine NEGATIVE      Leukocyte Esterase, Urine NEGATIVE     DRUG SCREEN,URINE - Abnormal    Amphetamine Screen, Urine Presumptive Negative      Barbiturate Screen, Urine Presumptive Positive (*)     Benzodiazepines Screen, Urine Presumptive Negative      Cannabinoid Screen, Urine Presumptive Positive (*)     Cocaine Metabolite Screen, Urine Presumptive Negative      Fentanyl Screen, Urine Presumptive Negative      Opiate Screen, Urine Presumptive Negative      Oxycodone Screen, Urine Presumptive Negative      PCP Screen, Urine  Presumptive Negative      Methadone Screen, Urine Presumptive Negative      Narrative:     Drug screen results are presumptive and should not be used to assess   compliance with prescribed medication. Contact the performing [facility] laboratory   to add-on definitive confirmatory testing if clinically indicated.    Toxicology screening results are reported qualitatively. The concentration must   be greater than or equal to the cutoff to be reported as positive. The concentration   at which the screening test can detect an individual drug or metabolite varies.   The absence of expected drug(s) and/or drug metabolite(s) may indicate non-compliance,   inappropriate timing of specimen collection relative to drug administration, poor drug   absorption, diluted/adulterated urine, or limitations of testing. For medical purposes   only; not valid for forensic use.    Interpretive questions should be directed to the laboratory medical directors.   LIPASE - Normal    Lipase 16      Narrative:     Venipuncture immediately after or during the administration of Metamizole may lead to falsely low results. Testing should be performed immediately prior to Metamizole dosing.   ACUTE TOXICOLOGY PANEL, BLOOD - Normal    Acetaminophen <10.0      Salicylate  <3      Alcohol <10     URINALYSIS WITH REFLEX CULTURE AND MICROSCOPIC    Narrative:     The following orders were created for panel order Urinalysis with Reflex Culture and Microscopic.  Procedure                               Abnormality         Status                     ---------                               -----------         ------                     Urinalysis with Reflex C...[[id number]]  Abnormal            Final result               Extra Urine Gray Tube[[id number]]                            In process                   Please view results for these tests on the individual orders.   EXTRA URINE GRAY TUBE     No orders to display     Medications   famotidine PF (Pepcid)  injection 20 mg (20 mg intravenous Given 2/16/25 2207)   ondansetron (Zofran) injection 4 mg (4 mg intravenous Given 2/16/25 2207)   sodium chloride 0.9 % bolus 1,000 mL (0 mL intravenous Stopped 2/16/25 2255)   lactated Ringer's bolus 1,000 mL (0 mL intravenous Stopped 2/16/25 2308)     New Prescriptions    No medications on file     Patient Hgb and quantitative 1829 more than doubled since yesterday.  Metabolic panel shows mild hyponatremia 134.  Bicarb of 17.  Does have leukocytosis 15.8.  Urinalysis shows no evidence of infection.  Does have ketones present.  Patient was started on IV fluid replacement will be admitted for further evaluation and treatment.  This is her third visit this week.    After reviewing patient's comorbidities, severity of history of presenting illness, labs and imaging if obtained in conjunction with physical exam and course in emergency department, deemed to have potential for deterioration/progression of symptoms that could lead to multiple morbidities or mortality, decision made that patient requires further observation/evaluation/treatment and patient admitted to appropriate service, patient/family understand and agree with plan.      I saw this patient in conjunction with Dr. Mari, please see his supervisory note.      Critical Care: Not warranted at this time        This chart was completed using voice recognition transcription software. Please excuse any errors of transcription including grammatical, punctuation, syntax and spelling errors.  Please contact me with any questions regarding this chart.    Procedure  Procedures     Ino Patel, MERLINE-CNP  02/19/25 0871

## 2025-02-17 NOTE — CONSULTS
Inpatient consult to Medicine  Consult performed by: MERLINE Bennett-CNP  Consult ordered by: Sree Hung MD  Reason for consult: Nausea and vomiting, +urine tox screen          Reason For Consult      History Of Present Illness  Lizet Storm is a 29 y.o. female presenting with nausea and vomiting.  Pt was admitted by OB services for N/V, 5 weeks pregnant.   We are consulted as pt had a + urine tox screen for marijuana and barbiturates.  Pt admits to using marijuana on a daily basis.  She also admits to taking four 30mg kratom pills on Friday.  Pt was recently admitted to [facility] from 1/28/25 until 2/1/25 for detox from heavy Kratom use.  Pt denies any fever or recent illness/ill contacts.  She says she is nauseous but no longer vomiting.  Pt reports that she had a bowel movement a few hours ago.  She denies any abd pain and is non-tender on exam.       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    Surgical History  She has a past surgical history that includes Cosmetic surgery (2022); Cervical biopsy w/ loop electrode excision; and Tonsillectomy.     Social History  She reports that she has been smoking cigarettes. She has never been exposed to tobacco smoke. She has never used smokeless tobacco. She reports that she does not currently use alcohol. She reports current drug use. Drug: Marijuana.    Family History  No family history on file.     Allergies  Patient has no known allergies.    Review of Systems   Constitutional:  Negative for diaphoresis and fever.   HENT:  Negative for congestion, rhinorrhea and sore throat.    Respiratory:  Negative for apnea, cough and shortness of breath.    Cardiovascular:  Negative for chest pain and leg swelling.   Gastrointestinal:  Positive for constipation, nausea and vomiting. Negative for abdominal pain.   Genitourinary:  Negative for decreased urine volume and difficulty  urinating.   Musculoskeletal:  Negative for back pain and neck pain.   Neurological:  Negative for dizziness, seizures and weakness.   Psychiatric/Behavioral:  Negative for behavioral problems, hallucinations and suicidal ideas.         Physical Exam  Constitutional:       General: She is not in acute distress.     Appearance: She is not ill-appearing or toxic-appearing.   Cardiovascular:      Rate and Rhythm: Normal rate and regular rhythm.      Pulses: Normal pulses.      Heart sounds: Normal heart sounds.   Pulmonary:      Effort: Pulmonary effort is normal.      Breath sounds: Normal breath sounds.   Abdominal:      General: Bowel sounds are normal.      Palpations: Abdomen is soft.      Tenderness: There is no abdominal tenderness.   Musculoskeletal:      Right lower leg: No edema.      Left lower leg: No edema.   Skin:     General: Skin is warm and dry.   Neurological:      General: No focal deficit present.      Mental Status: She is alert and oriented to person, place, and time.   Psychiatric:         Mood and Affect: Mood normal.         Behavior: Behavior normal.          Last Recorded Vitals  /81   Pulse 81   Temp 37.6 °C (99.7 °F) (Oral)   Resp 17   Wt 77.1 kg (169 lb 15.6 oz)   SpO2 100%     Relevant Results  Scheduled medications  bisacodyl, 10 mg, rectal, Once  citalopram, 40 mg, oral, Daily  docusate sodium, 100 mg, oral, Daily  folic acid, 0.8 mg, oral, Daily  lidocaine, 1 patch, transdermal, Once  nicotine, 1 patch, transdermal, Daily  pantoprazole, 40 mg, intravenous, Daily  pyridoxine, 20 mg, intravenous, q8h  pyridoxine, 25 mg, oral, q8h  sodium phosphates, 1 enema, rectal, Once      Continuous medications  dextrose 5 % and lactated Ringer's, 175 mL/hr, Last Rate: 175 mL/hr (02/17/25 0524)      PRN medications  PRN medications: acetaminophen, acetaminophen, albuterol, calcium carbonate, lidocaine, ondansetron, ondansetron ODT          Assessment/Plan     N/V  -IVF  -antiemetics  PRN  -possibly secondary to withdrawal, hyperemesis secondary to cannabinoid use, hyperemesis secondary to pregnancy  -Liquid diet    Polysubstance Abuse  -supportive care  -from symptoms pt is describing appears to be similar to previous withdrawal symptoms from kratom.  I am unsure of tx for this as I am not familiar with this drug.  Pt was recently in [facility] and prescribed subutex and phenobarb. Recommend tx to tertiary care facility or detox center who specializes in this.   -long discussion with pt regarding cessation of marijuana and kratom for her health and the health of her unborn fetus  -psych NP following  -hx ETOH abuse (sober 6 months) hx heroin abuse  -current use marijuana, kratom    Tobacco Use  -nicotine patch  -encourage cessation    Depression  -continue celexa    Plan   Thank you. for this consultation will follow along      MERLINE Bennett-CNP

## 2025-02-17 NOTE — CONSULTS
Reason For Consult  Positive drug screen, needed resources    History Of Present Illness  Lizet Storm is a 29 y.o. female presenting with hyperemesis, roughly 5 weeks pregnant .     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    Social History  She reports that she has been smoking cigarettes. She has never been exposed to tobacco smoke. She has never used smokeless tobacco. She reports that she does not currently use alcohol. She reports current drug use. Drug: Marijuana.    MSW met with pt and spouse to go over her hx of substance abuse and to make sure she has all resources for mental wellness along with sobriety. Pt and Sig other were very happy to have the discussion and seem very invested in this pregnancy. She and her Sig have been trying for more than a year. Pt then went to [facility] for kratom withdrawal. Once she left she ended up getting pregnancy. MSW informed pt that she came  up positive for barbiturates. Pt indicated that was from her stay at [facility].     MSW discussed that despite kratom being legal, baby will withdraw from it which would automatically end up with baby in the special care unit. Provided information on Cannabinoid hyper emesis and Kratom withdrawal in babies.  Discussed pt having depression and ADHD. She stopped her meds when she found out she was pregnant. Encouraged pt to speak with a psych provider again and see what she can take to be proactive.     Both pt and Sig other were very responsive to the discussion. They expressed their goal is to do what is best for the baby. Discussed additional resources for food and utilities. Provided a hand out with information. MSW gave them my business card and encouraged them to reach out with any questions or needs.     Pt has been seen by [facility] and cleared.      Sharlene CARYA, MSW

## 2025-02-17 NOTE — H&P
OB Admission H&P    Assessment/Plan    Lizet Storm is a 29 y.o.  at 5w3d. BLAISE: 10/17/2025, by Last Menstrual Period.     Diagnosis:  , gestational age 5 weeks and 3 days by LMP  Nausea and vomiting in pregnancy  Substance withdrawal  Smoker  Pregnancy with uncertain viability  Upper abdominal pain   Gastritis    Plan  -Admit to antepartum 2  -HCG rising appropriately  -Emetics as needed  -Comfort meds initiated.  Consulted the behavioral medicine team. Communicated with JHOANA James. If she can be managed here, OBGYN team would like to transfer to medical floor for expert nursing care. No OB needs at this time.   -Continue proton pump inhibitor.  Lidocaine patch ordered for upper abdominal muscle pain likely secondary to prolonged  nausea and vomiting.    Fetal Status  To be determined.      Subjective   The patient was transferred from [facility] emergency room after the patient arrived with 10 days of nausea vomiting.  She has had multiple ER visits for the same complaint.  She has been treated with fluids and antiemetics and returns with recurrence.  Significant other reports that she does well at night, and nausea vomiting starts in the morning and then can use throughout the day. They have been educated about sipping and BRAT diet.  Patient endorses constipation. No diarrhea. +Upper abdominal pain. Frequent episodes of dry heaves. Tolerates water, but she get urge to gulp or sip to quickly then vomits.  Denies UTI symptoms.    In review of her hospital records, she has been counseled and educated about possible cyclical vomiting secondary to THC use disorder.    She was also counseled to discontinue use of kratom due to withdrawal risk.  She has been compliant with her psychiatric medications.  Although, she discontinued DHD medications and Abilify for the pregnancy. Active Rxs include Celexa and Trazadone.     Desired pregnancy, first pregnancy  Recovering alcoholic, in  remission    Prenatal Provider [facility] - Dr. Brizuela    OB History    Para Term  AB Living   1 0 0 0 0 0   SAB IAB Ectopic Multiple Live Births   0 0 0 0 0      # Outcome Date GA Lbr Odell/2nd Weight Sex Type Anes PTL Lv   1 Current                Past Surgical History:   Procedure Laterality Date    CERVICAL BIOPSY  W/ LOOP ELECTRODE EXCISION      COSMETIC SURGERY      BBL & lipo    TONSILLECTOMY         Social History     Tobacco Use    Smoking status: Every Day     Current packs/day: 0.50     Types: Cigarettes     Passive exposure: Never    Smokeless tobacco: Never   Substance Use Topics    Alcohol use: Not Currently     Comment: States used to be a really bad alcoholic       No Known Allergies    Medications Prior to Admission   Medication Sig Dispense Refill Last Dose/Taking    albuterol (Ventolin HFA) 90 mcg/actuation inhaler Inhale 2 puffs every 4 hours if needed for wheezing or shortness of breath. 8 g 5     citalopram (CeleXA) 40 mg tablet Take 1 tablet (40 mg) by mouth once daily.       omeprazole (PriLOSEC) 40 mg DR capsule TAKE 1 CAPSULE BY MOUTH EVERY MORNING BEFORE MEALS 30 capsule 0     ondansetron (Zofran) 4 mg tablet Take 1 tablet (4 mg) by mouth every 6 hours if needed for nausea or vomiting. (Patient not taking: Reported on 2025) 15 tablet 0     [] ondansetron ODT (Zofran-ODT) 4 mg disintegrating tablet Dissolve 1 tablet (4 mg) in the mouth every 8 hours if needed for nausea or vomiting for up to 7 days. 20 tablet 0     prenatal 93-iron-folate 9-dha 31 mg iron- 1 mg-200 mg capsule Take 1 capsule by mouth once daily. (Patient not taking: Reported on 2025) 30 capsule 11     traZODone (Desyrel) 100 mg tablet Take 1 tablet (100 mg) by mouth once daily at bedtime.        Objective     Last Vitals  Temp Pulse Resp BP MAP O2 Sat   37.1 °C (98.8 °F) 88 18 (!) 142/112 122 100 %     Blood Pressures         2025  0405             BP: 142/112        Visit Vitals  /81    Pulse 81   Temp 37.6 °C (99.7 °F) (Oral)   Resp 17     (Follow-up vital signs)    Physical Exam  General: She is in mild distress.  Cooperative with exam.  Neuro/psych: She is alert and oriented x 3.  The patient is restless in bed.  Rolling from lateral recumbent, to prone, to supine.  Then she jumps out of bed pulls her shirt off, no brassiere, still restless on her feet.  Her hair is unkempt.  He desires help.  This pregnancy is desired.  Asked about substance use she only admits to kratom, getting and THC.   Cardiology:  Heart regular rate and rhythm with S1 and S2.  Lungs clear to auscultation bilaterally without rales rhonchi or wheezes.  Abdomen: Flat; tender epigastric area; soft; +BS; no distention  Extremities: Symmetric. Generalized skaking.  Skin: No acute rash or lesions that are visible.  Warm.  : Deferred    Chaperone Present: Yes.  Chaperone Name/Title: Significant other/FOB  Examination Chaperoned: Entire Physical Exam       Labs in chart were reviewed.  CMP   Recent Labs     02/16/25  2154   *   K 3.6      CO2 17*   ANIONGAP 16   BUN 7   CREATININE 0.49*   EGFR >90   CALCIUM 9.3   ALBUMIN 4.3   PROT 7.1   ALKPHOS 59   ALT 29   AST 14   BILITOT 0.6        Results from last 7 days   Lab Units 02/16/25  2154   WBC AUTO x10*3/uL 15.8*   HEMOGLOBIN g/dL 14.6   HEMATOCRIT % 41.4   PLATELETS AUTO x10*3/uL 318   AST U/L 14   ALT U/L 29   CREATININE mg/dL 0.49*      Prenatal labs: Pending

## 2025-02-17 NOTE — ED PROVIDER NOTES
This patient was seen by the advanced practice provider.  I have personally performed a substantive portion of the encounter.      I have seen and examined the patient; agree with the workup, evaluation, MDM, management and diagnosis.  The care plan has been discussed.       I personally saw the patient and made/approved the management plan and take responsibility for the patient management.       HPI:  29-year-old female presents complaint nausea vomiting.  This patient's fourth visit to the emergency department for the same.  She has had elevated beta hCGs which OB is following.  Her medications at home are not helping.    Physical exam: General:  Awake, alert, no acute distress.  Head: Normocephalic, Atraumatic  Neck: Supple, trachea midline, no stridor  Skin: Warm and dry, no rashes   Lungs:  No acute respiratory distress, speaking in full sentences without difficulty  Neuro:  No gross focal neurologic deficits, NIH is 0  Musculoskeletal:  Full range of motion in all 4 extremities  Psychiatric:  Alert oriented x 3, Good insight into condition.      MDM:  Patient's quantitative hCG is 1800 which is significant elevated from her 1 9 days ago still vomiting despite treatment Emergency Department.  [facility] that she would benefit from admission.  Contacted Dr. Winn at [facility] who accepted transfer this patient to their facility.     Umang Mari, DO  02/17/25 0148

## 2025-02-17 NOTE — CONSULTS
"Inpatient consult to Psychiatry  Consult performed by: Minna Giles, APRN-CNP  Consult ordered by: Sree Hung MD  Reason for consult: Hx of substance use disorder.  THC and Kratum in last month. Multiple ER admissions. N/V. Very early pregnancy.      PSYCHIATRY CONSULT NOTE    Visit type: Face to face evaluation       HISTORY OF PRESENT ILLNESS:     Lizet Storm is a 29 y.o. female with a past psychiatric history of depression, ADHD, alcohol use disorder (in early remission ~6 months), substance use disorder (marijuana and Kratom) and a past medical history of cervical dysplasia who was admitted to [facility] on 2/17 for nausea and vomiting; concern for symptoms of Kratom withdrawal.     On interview, pt is awake and alert, standing in her room.  She is calm, engaged in interview.  Patient reports that she was recently admitted to [facility] from 1/28/25-2/1/25 for help with detox from Kratom.  Patient reports that she was taking upwards of #30- 30 mg tablets of Kratom daily prior to admission to [facility].   She states that she was treated with phenobarbital and subutex during her detox.  She states that the subutex was tapered off prior to discharge.  Pt reports that after discharge she felt \"great\" for 4 days, however on Feb. 5th she woke up feeling sick and that she had muscle aches, nausea, and vomiting with decreased appetite daily for about 9 days.  She presented to the ED on 2/6/25 and 2/8/25 with complaints of nausea and vomiting. Pt states she thought she had the flu or a stomach virus, but was tested and neg.   She reports that on Friday, 2/14, she relapsed and took #4-30 mg Kratom tablets. She reports that Saturday she slept most of the day and Sunday woke up again with significant nausea/vomiting, muscle cramps and came to the ED.      Regarding depression and anxiety symptoms patient reports symptoms of depression including anhedonia, increased sleeping ~12 hours daily, low energy, " and some feelings of guilt.  She reports she lost her job due to her recent illness and missing work.  Pt states that currently she feels happy to know that she is pregnant, as she has been trying for a year and had convinced herself that the results were false.  She denies significant anxiety today and reports that her symptoms are somewhat improved over yesterday.  She continues to report muscle cramps, nausea, low appetite.  She yawns frequently during the assessment and sounds congested.  Patient denies history of or symptoms of bipolar disorder, denies delusions, paranoia or hallucinations.  She denies SI, HI, SIB.         Past Psychiatric History  Current/Previous Diagnoses:  depression, AUD in early remission, marijuana use, kratom abuse  Current Psychiatrist/Provider:  Jyoti Sousa   Current Therapist:  [facility], [place] - scheduled but has not seen her yet  Past Medication Trials:  citalopram, vistaril, doxepin, adderall  Inpatient Hospitalizations:  [facility] x 2  Suicide Attempts: Denies  Homicide attempts/Violence: Denies  Self Harm/Self Injurious: Denies    Substance Abuse History  Tobacco use history:  5 cigarettes a day, has cut down  Alcohol use history:  History of AUD, no use for 6 months  Cannabis use history:  daily  Drug Use History:  Heavy Kratom use with detox at [facility] Jan 28-Feb 1, 2025    Social History  Household: lives with her boyfriend  Occupation: recently unemployed from retail work  Weapons at home and access to lethal means: Denies    OARRS REVIEW  OARRS comments: Adderall last filled 2/11/25             Various canabis products vape  Past Medical History  She has a past medical history of Cervical dysplasia and Recovering alcoholic in remission (Multi).    ALLERGIES  Patient has no known allergies.    Surgical History  She has a past surgical history that includes Cosmetic surgery (2022); Cervical biopsy w/ loop electrode excision; and Tonsillectomy.    FAMILY HISTORY  No  "family history on file.     PSYCHIATRIC REVIEW OF SYSTEMS  Depression: depressed mood, feelings of worthlessness or guilt, diminished interest or pleasure in all or most activities, and excessive sleep ~12 hours daily  Anxiety: excessive worry that is difficult to control and muscle tension  Seble: negative  Psychosis: negative  Delirium: negative     OBJECTIVE:     VITALS      2/8/2025     8:53 AM 2/8/2025    11:27 AM 2/16/2025     9:29 PM 2/17/2025     4:05 AM 2/17/2025     5:52 AM 2/17/2025     9:07 AM 2/17/2025    10:05 AM   Vitals   Systolic 120 118 121 142 136 127    Diastolic 87 75 102 112 88 81    BP Location  Left arm Left arm       Heart Rate 79 71 98 88 76 81    Temp 36.5 °C (97.7 °F)  36.6 °C (97.9 °F) 37.1 °C (98.8 °F) 37 °C (98.6 °F)  37.6 °C (99.7 °F)   Resp 20 16 24 18 16 17    Height 1.702 m (5' 7\")  1.702 m (5' 7\") 1.702 m (5' 7\")      Weight (lb) 175  170 169.97      BMI 27.41 kg/m2  26.63 kg/m2 26.62 kg/m2      BSA (m2) 1.94 m2  1.91 m2 1.91 m2         Body mass index is 26.62 kg/m².  Facility age limit for growth %luca is 20 years.  Wt Readings from Last 4 Encounters:   02/17/25 77.1 kg (169 lb 15.6 oz)   02/16/25 77.1 kg (170 lb)   02/08/25 79.4 kg (175 lb)   02/06/25 81.6 kg (180 lb)       Mental Status Exam  General: 29 year old laying on bed, mildly uncomfortable  Appearance: Appeared as age stated; fairly groomed, good eye contact  Attitude: Pleasant and cooperative; guarded but warm.  Behavior: Fair EC; overall responding appropriately  Motor Activity: some fidgeting in legs, reports due to muscle spasms  Speech: Clear, with fair phonation, and no lisp nor dysarthria.   Mood: \"ok\"  Affect:  constricted   Thought Process: Linear and logical; not perseverating   Thought Content: Denied SI/HI. Not voicing/endorsing delusions.  Thought Perception: Did not appear to be responding to internal stimuli. Not endorsing AVH  Cognition: Grossly intact; A&O x4/4 to self, place, date, and " context.  Insight: Fair  Judgement: Fair    HOME MEDICATIONS  Medication Documentation Review Audit       Reviewed by Talia Tello RN (Registered Nurse) on 25 at 0401      Medication Order Taking? Sig Documenting Provider Last Dose Status   albuterol (Ventolin HFA) 90 mcg/actuation inhaler [id number]  Inhale 2 puffs every 4 hours if needed for wheezing or shortness of breath. SAVI Gray  Active   citalopram (CeleXA) 40 mg tablet [id number]  Take 1 tablet (40 mg) by mouth once daily. Historical Provider, MD  Active   omeprazole (PriLOSEC) 40 mg DR capsule [id number]  TAKE 1 CAPSULE BY MOUTH EVERY MORNING BEFORE MEALS SAVI Gray  Active   ondansetron (Zofran) 4 mg tablet [id number]  Take 1 tablet (4 mg) by mouth every 6 hours if needed for nausea or vomiting.   Patient not taking: Reported on 2025    SAVI Baez  Active   ondansetron ODT (Zofran-ODT) 4 mg disintegrating tablet [id number]  Dissolve 1 tablet (4 mg) in the mouth every 8 hours if needed for nausea or vomiting for up to 7 days. Keesha Vasquez, DO   25 2359   prenatal 93-iron-folate 9-dha 31 mg iron- 1 mg-200 mg capsule [id number]  Take 1 capsule by mouth once daily.   Patient not taking: Reported on 2025    Jocelyn Brizuela, DO   24 2359   traZODone (Desyrel) 100 mg tablet [id number]  Take 1 tablet (100 mg) by mouth once daily at bedtime. Historical Provider, MD  Active                     CURRENT MEDICATIONS  Scheduled medications  bisacodyl, 10 mg, rectal, Once  citalopram, 40 mg, oral, Daily  docusate sodium, 100 mg, oral, Daily  folic acid, 0.8 mg, oral, Daily  lidocaine, 1 patch, transdermal, Once  nicotine, 1 patch, transdermal, Daily  pantoprazole, 40 mg, intravenous, Daily  pyridoxine, 20 mg, intravenous, q8h  pyridoxine, 25 mg, oral, q8h  sodium phosphates, 1 enema, rectal, Once        Continuous medications  dextrose 5 % and lactated Ringer's, 175 mL/hr, Last  Rate: 175 mL/hr (02/17/25 0524)        PRN medications  PRN medications: acetaminophen, acetaminophen, albuterol, calcium carbonate, lidocaine, ondansetron, ondansetron ODT     LABS  Admission on 02/16/2025, Discharged on 02/17/2025   Component Date Value Ref Range Status    Lipase 02/16/2025 16  9 - 82 U/L Final    Glucose 02/16/2025 133 (H)  74 - 99 mg/dL Final    Sodium 02/16/2025 134 (L)  136 - 145 mmol/L Final    Potassium 02/16/2025 3.6  3.5 - 5.3 mmol/L Final    Chloride 02/16/2025 105  98 - 107 mmol/L Final    Bicarbonate 02/16/2025 17 (L)  21 - 32 mmol/L Final    Anion Gap 02/16/2025 16  10 - 20 mmol/L Final    Urea Nitrogen 02/16/2025 7  6 - 23 mg/dL Final    Creatinine 02/16/2025 0.49 (L)  0.50 - 1.05 mg/dL Final    eGFR 02/16/2025 >90  >60 mL/min/1.73m*2 Final    Calculations of estimated GFR are performed using the 2021 CKD-EPI Study Refit equation without the race variable for the IDMS-Traceable creatinine methods.  https://jasn.asnjournals.org/content/early/2021/09/22/ASN.5946388527    Calcium 02/16/2025 9.3  8.6 - 10.3 mg/dL Final    Albumin 02/16/2025 4.3  3.4 - 5.0 g/dL Final    Alkaline Phosphatase 02/16/2025 59  33 - 110 U/L Final    Total Protein 02/16/2025 7.1  6.4 - 8.2 g/dL Final    AST 02/16/2025 14  9 - 39 U/L Final    Bilirubin, Total 02/16/2025 0.6  0.0 - 1.2 mg/dL Final    ALT 02/16/2025 29  7 - 45 U/L Final    Patients treated with Sulfasalazine may generate falsely decreased results for ALT.    WBC 02/16/2025 15.8 (H)  4.4 - 11.3 x10*3/uL Final    nRBC 02/16/2025 0.0  0.0 - 0.0 /100 WBCs Final    RBC 02/16/2025 4.75  4.00 - 5.20 x10*6/uL Final    Hemoglobin 02/16/2025 14.6  12.0 - 16.0 g/dL Final    Hematocrit 02/16/2025 41.4  36.0 - 46.0 % Final    MCV 02/16/2025 87  80 - 100 fL Final    MCH 02/16/2025 30.7  26.0 - 34.0 pg Final    MCHC 02/16/2025 35.3  32.0 - 36.0 g/dL Final    RDW 02/16/2025 11.8  11.5 - 14.5 % Final    Platelets 02/16/2025 318  150 - 450 x10*3/uL Final     Neutrophils % 02/16/2025 89.6  40.0 - 80.0 % Final    Immature Granulocytes %, Automated 02/16/2025 0.4  0.0 - 0.9 % Final    Immature Granulocyte Count (IG) includes promyelocytes, myelocytes and metamyelocytes but does not include bands. Percent differential counts (%) should be interpreted in the context of the absolute cell counts (cells/UL).    Lymphocytes % 02/16/2025 7.4  13.0 - 44.0 % Final    Monocytes % 02/16/2025 2.2  2.0 - 10.0 % Final    Eosinophils % 02/16/2025 0.1  0.0 - 6.0 % Final    Basophils % 02/16/2025 0.3  0.0 - 2.0 % Final    Neutrophils Absolute 02/16/2025 14.19 (H)  1.20 - 7.70 x10*3/uL Final    Percent differential counts (%) should be interpreted in the context of the absolute cell counts (cells/uL).    Immature Granulocytes Absolute, Au* 02/16/2025 0.06  0.00 - 0.70 x10*3/uL Final    Lymphocytes Absolute 02/16/2025 1.17 (L)  1.20 - 4.80 x10*3/uL Final    Monocytes Absolute 02/16/2025 0.35  0.10 - 1.00 x10*3/uL Final    Eosinophils Absolute 02/16/2025 0.01  0.00 - 0.70 x10*3/uL Final    Basophils Absolute 02/16/2025 0.04  0.00 - 0.10 x10*3/uL Final    HCG, Beta-Quantitative 02/16/2025 1,829 (H)  <5 mIU/mL Final    Low-level positive HCG results can be seen in early pregnancy, in roc- or post-menopausal females due to normal pituitary HCG production, or with analytic interference. Repeat testing in 48-72 hours can aid in assessing for pregnancy as results should double in this time period. FSH measurement is recommended in roc- or post-menopausal females as concurrent elevation of FSH can support pituitary production as the source of the HCG elevation.       Color, Urine 02/16/2025 Yellow  Light-Yellow, Yellow, Dark-Yellow Final    Appearance, Urine 02/16/2025 Clear  Clear Final    Specific Gravity, Urine 02/16/2025 1.022  1.005 - 1.035 Final    pH, Urine 02/16/2025 6.5  5.0, 5.5, 6.0, 6.5, 7.0, 7.5, 8.0 Final    Protein, Urine 02/16/2025 NEGATIVE  NEGATIVE, 10 (TRACE), 20 (TRACE) mg/dL  Final    Glucose, Urine 02/16/2025 50 (TRACE) (A)  Normal mg/dL Final    Blood, Urine 02/16/2025 NEGATIVE  NEGATIVE mg/dL Final    Ketones, Urine 02/16/2025 80 (3+) (A)  NEGATIVE mg/dL Final    Bilirubin, Urine 02/16/2025 NEGATIVE  NEGATIVE mg/dL Final    Urobilinogen, Urine 02/16/2025 Normal  Normal mg/dL Final    Nitrite, Urine 02/16/2025 NEGATIVE  NEGATIVE Final    Leukocyte Esterase, Urine 02/16/2025 NEGATIVE  NEGATIVE Final    Extra Tube 02/16/2025 Hold for add-ons.   Final    Auto resulted.    Amphetamine Screen, Urine 02/16/2025 Presumptive Negative  Presumptive Negative Final    CUTOFF LEVEL: 500 NG/ML   Cross-reactivity has been reported with high concentrations   of the following drugs: buproprion, chloroquine, chlorpromazine,   ephedrine, mephentermine, fenfluramine, phentermine,   phenylpropanolamine, pseudoephedrine, and propranolol.    Barbiturate Screen, Urine 02/16/2025 Presumptive Positive (A)  Presumptive Negative Final    CUTOFF LEVEL: 200 NG/ML    Benzodiazepines Screen, Urine 02/16/2025 Presumptive Negative  Presumptive Negative Final    CUTOFF LEVEL: 200 NG/ML    Cannabinoid Screen, Urine 02/16/2025 Presumptive Positive (A)  Presumptive Negative Final    CUTOFF LEVEL: 50 NG/ML    Cocaine Metabolite Screen, Urine 02/16/2025 Presumptive Negative  Presumptive Negative Final    CUTOFF LEVEL: 150 NG/ML    Fentanyl Screen, Urine 02/16/2025 Presumptive Negative  Presumptive Negative Final    CUTOFF LEVEL: 5 NG/ML    Opiate Screen, Urine 02/16/2025 Presumptive Negative  Presumptive Negative Final    CUTOFF LEVEL: 300 NG/ML  The opiate screen does not detect fentanyl, meperidine, or   tramadol. Oxycodone is not consistently detected (refer to  Oxycodone Screen, Urine result).    Oxycodone Screen, Urine 02/16/2025 Presumptive Negative  Presumptive Negative Final    CUTOFF LEVEL: 100 NG/ML  This test will accurately detect both oxycodone and oxymorphone.    PCP Screen, Urine 02/16/2025 Presumptive  Negative  Presumptive Negative Final    CUTOFF LEVEL:  25 NG/ML  Cross-reactivity has been reported with dextromethorphan.    Methadone Screen, Urine 02/16/2025 Presumptive Negative  Presumptive Negative Final    CUTOFF LEVEL: 150 NG/ML  The metabolite L-alpha-acetylmethadol (LAAM) is not  detected by this method in concentrations that would  be found in the urine of patients on LAAM therapy.    Acetaminophen 02/16/2025 <10.0  10.0 - 30.0 ug/mL Final    Salicylate  02/16/2025 <3  4 - 20 mg/dL Final    Alcohol 02/16/2025 <10  <=10 mg/dL Final     IMAGING  US pelvis transvaginal    Result Date: 2/6/2025  STUDY: Pelvis Transvaginal OB Ultrasound; 2/6/2025 8:44 PM. INDICATION: Abdominal pain. Abnormal CT. HISTORY: LMP 1/10/2025. Beta hCG= 28 mIU/mL. COMPARISON: CT A/P 2/6/2025. ACCESSION NUMBER(S): [id number] ORDERING CLINICIAN: SAVANNAH TABOR TECHNIQUE: Transvaginal ultrasonography of the pelvis was performed with color and spectral Doppler evaluation of the ovaries. FINDINGS:  Uterus demonstrates normal echogenicity and architecture without focal lesion and measures 7.6 x 3.6 x 4.7 cm. Cervical os is closed. The endometrium measures 1.1 cm. There is no evidence of an intrauterine pregnancy.   Right ovary measures 4.7 x 2.3 x 3.0 cm. Presumed corpus luteal cyst is visualized measuring 2.1 x 1.8 x 1.9 cm (manually measured). Spectral Doppler evaluation of the ovary was performed. Normal color and spectral Doppler flow is visualized. Left ovary measures 3.5 x 1.7 x 2.5 cm. No discrete left adnexal mass or fluid collection is identified. Spectral Doppler evaluation of the ovary was performed. Normal color and spectral Doppler flow is visualized. A small amount of fluid is present within the cul-de-sac.    There is no evidence of an intrauterine pregnancy. Ectopic pregnancy is neither seen nor excluded at this time. Continued monitoring with serial Beta hCG values and/or follow up ultrasound is recommended.  Normal color  and spectral Doppler flow within the bilateral ovaries. Signed by Maurisio Syed MD    US pelvis    Addendum Date: 2/6/2025    Interpreted By:  Evelio Foreman, ADDENDUM: Patient reportedly has a positive beta HCG of 28. Differential considerations include early normal IUP, failed 1st trimester pregnancy, or ectopic pregnancy of unknown location. The right ovarian cystic lesion appears to represent a corpus luteum on the CT scan however is suboptimally assessed transabdominally on ultrasound. The emergency room team is reportedly concerned for ectopic pregnancy. I recommend further evaluation with transvaginal ultrasound for more optimal assessment of the adnexa.   Evelio Foreman discussed the significance and urgency of this critical finding by telephone with  Dr Brizuela on 2/6/2025 at 7:09 pm. (**-RCF-**) Findings:  See findings.     Signed by: Evelio Foreman 2/6/2025 7:10 PM   -------- ORIGINAL REPORT -------- Dictation workstation:   [id number]    Result Date: 2/6/2025  Interpreted By:  Evelio Foreman, STUDY: US PELVIS;  2/6/2025 4:42 pm   INDICATION: Signs/Symptoms:pain / vomiting.   COMPARISON: None.   ACCESSION NUMBER(S): [id number]   ORDERING CLINICIAN: LAUREL HIRSCH   TECHNIQUE: Multiple multiplanar static gray scale, color and spectral waveform sonographic images of the pelvis were obtained.  Transabdominal ultrasound was performed.   FINDINGS: Suboptimal assessment due to lack of transvaginal imaging.   UTERUS: The uterus measures 7.6 x 3.4 x 5.8 cm. No uterine masses.   ENDOMETRIUM: The endometrium measures 0.9 cm. No focal abnormality.   RIGHT OVARY: 3.7 x 3.8 x 2.4 cm. Anechoic structure suggestive of physiologic cyst measuring 2.9 x 2.2 x 2.2 cm. No solid mass. Arterial and venous flow present.   LEFT OVARY: Obscured by overlying bowel gas.   OTHER: No significant pelvic free fluid.       Right ovarian follicle.   No worrisome findings in the uterus.   Left ovary not visualized.   Signed  by: Evelio Foreman 2/6/2025 5:24 PM Dictation workstation:   [id number]    CT abdomen pelvis w IV contrast    Result Date: 2/6/2025  Interpreted By:  Jun Gómez, STUDY: CT ABDOMEN PELVIS W IV CONTRAST;  2/6/2025 3:03 pm   INDICATION: Signs/Symptoms:Pain vomiting.     COMPARISON: None.   ACCESSION NUMBER(S): [id number]   ORDERING CLINICIAN: LAUREL HIRSCH   TECHNIQUE: CT of the abdomen and pelvis was performed.  Standard contiguous axial images were obtained at 3 mm slice thickness through the abdomen and pelvis. Coronal and sagittal reconstructions at 3 mm slice thickness were performed.  75 ML of Omnipaque 350 was administered intravenously without immediate complication.   FINDINGS: LOWER CHEST: A small hiatal hernia is present. The lungs are normally aerated.   ABDOMEN:   LIVER: Normal.   BILE DUCTS: Normal.   GALLBLADDER: Normal.   PANCREAS: Normal.   SPLEEN: Normal.   ADRENAL GLANDS: Normal.   KIDNEYS AND URETERS: The kidneys are normal with no hydronephrosis or hydroureter noted.   PELVIS:   BLADDER: Normal.   REPRODUCTIVE ORGANS: The right adnexal region has a 2.1 cm cyst/mass with relatively thick rim enhancement. Trace amount of fluid is also noted in this region. The uterine cavity also has a trace amount of fluid.   BOWEL: The caliber and distribution of the bowel is normal. The appendix has a normal caliber and extends into the right adnexal region.   VESSELS: Normal.   PERITONEUM/RETROPERITONEUM/LYMPH NODES: No retroperitoneal/pelvic adenopathy is noted. A trace amount of ascites is present adjacent to the right adnexa.   BONES AND ABDOMINAL WALL: Bony destructive processes are noted. No abdominal wall hernias are noted.       1.  The right adnexal region has a 2.1 cm ring-like enhancing structure which may represent a collapsing cyst. Ectopic pregnancy cannot be excluded. A trace amount of fluid is present in this region as well. 2. Small hiatal hernia.     MACRO: Critical Finding:  See findings.  Notification was initiated on 2/6/2025 at 3:24 pm by  Jun Gómez.  (**-OCF-**)   Signed by: Jun Gómez 2/6/2025 3:24 PM Dictation workstation:   [id number]    XR chest 1 view    Result Date: 2/6/2025  Interpreted By:  Muna Noguera, STUDY: XR CHEST 1 VIEW;  2/6/2025 12:40 pm   INDICATION: Signs/Symptoms:chills.     COMPARISON: Chest x-ray 06/26/2024.   ACCESSION NUMBER(S): [id number]   ORDERING CLINICIAN: LAUREL HIRSCH   FINDINGS: AP radiographs of the chest were provided.   A metallic necklace overlies the neck.   CARDIOMEDIASTINAL SILHOUETTE: Cardiomediastinal silhouette is normal in size and configuration.   LUNGS: No focal consolidation or airspace opacity. No pleural effusion or pneumothorax.   ABDOMEN: No remarkable upper abdominal findings.   BONES: No acute osseous changes.       1.  No evidence of acute cardiopulmonary process.       MACRO: None   Signed by: Muna Noguera 2/6/2025 12:49 PM Dictation workstation:   [id number]       ASSESSMENT:     PSYCHIATRIC RISK ASSESSMENT  Violence Risk Factors:  stress/destabilizers  Acute Risk of Harm to Others is Considered: Low  Suicide Risk Factors: ; /[place] native and substance abuse   Protective Factors: positive family relationships, hopefulness/future-orientation, marriage/partnership, and pregnancy  Acute Risk of Harm to Self is Considered: Low    DIAGNOSIS  Assessment & Plan  Gastritis    MDD, recurrent  Substance Use Disorder, Kratom, marijuana with possible withdrawal  R/O cannabinoid hyperemesis syndrome    IMPRESSION  29 year old female, 5 weeks pregnant, presents with nausea and vomiting, muscle cramps since 2/5/25. Pt has had two ED visits for same.  She was recently treated for Kratom withdrawal at [facility] and received phenobarbital and subutex for detox.  Her UDS is pos for barbituates, likely due to this treatment.  She reports that she felt good after discharge, however 4 days after discharge she started to  have nausea, vomiting and muscle cramps.  She continues to complain of nausea, vomiting, poor appetite, and muscle cramping which is inconsistent with history provided.   There is concern for withdrawal,  COWS score 6.      Pt requesting treatment for symptoms and possibly upon discharge but does have some concerns with continued treatment during pregnancy.  She was encouraged to discuss with her  provider and with [facility] to weigh options for ongoing treatment.  Pt states she previously declined ongoing MAT at [facility].  Ultimately she wants to only being taking Citalopram long term during pregnancy.  She declines to return to [facility] for detox management at this time.      PLAN/ RECOMMENDATIONS:     -Continue Celexa 40 mg daily for depression    -Recommend testing for Norovirus     - Comfort meds, consider clonidine as bp can tolerate, 0.1 mg q6H  PRN for autonomic symptoms of withdrawal (diaphoresis, myalgias, cramping, anxiety)    -Defer to medical team for treatment of Kratom withdrawal.  Pt was treated at [facility] Jan 28th-Feb 1st.  Was treated with phenobarb on first day and then low dose subutex (pt thinks 2 mg TID) tapering off prior to discharge.     -Follow up with outpatient  provider.  Assisted pt to facilitate an appointment with provider.  Appt scheduled for Tuesday Feb. 25th at 2:20 pm.      -Pt was provided [employer] resources from  earlier today.  Provided pt with information handout related to citalopram in pregnancy.  Encouraged cessation from substances including alcohol, marijuana and Kratom for health of patient and baby.    - Patient does not currently meet criteria for inpatient psychiatric admission.     -Thank you for allowing us to participate in the care of this patient.  Psychiatry will continue to follow.    I personally spent 75 minutes today providing care for this patient, including preparation, face to face time, documentation and other services such as review of medical  records, diagnostic result, patient education, counseling, coordination of care as specified in the encounter.       Addendum 15:30 Discussed with medical team who requests psychiatry to handle withdrawal symptoms. Discussed case with Dr. Hung.  Patient reported feeling improved from yesterday, COWS rating 6 during assessment.  Recommend supportive care for mild withdrawal symptoms.  We also discussed possibility of transferring patient to [facility] if needed for further support of withdrawal symptoms.

## 2025-02-18 ENCOUNTER — APPOINTMENT (OUTPATIENT)
Dept: RADIOLOGY | Facility: HOSPITAL | Age: 30
End: 2025-02-18
Payer: COMMERCIAL

## 2025-02-18 LAB
ABO GROUP (TYPE) IN BLOOD: NORMAL
ANION GAP SERPL CALC-SCNC: 10 MMOL/L (ref 10–20)
ANION GAP SERPL CALC-SCNC: 15 MMOL/L (ref 10–20)
ANTIBODY SCREEN: NORMAL
BUN SERPL-MCNC: 4 MG/DL (ref 6–23)
BUN SERPL-MCNC: 5 MG/DL (ref 6–23)
CALCIUM SERPL-MCNC: 8.1 MG/DL (ref 8.6–10.6)
CALCIUM SERPL-MCNC: 8.6 MG/DL (ref 8.6–10.6)
CHLORIDE SERPL-SCNC: 107 MMOL/L (ref 98–107)
CHLORIDE SERPL-SCNC: 109 MMOL/L (ref 98–107)
CO2 SERPL-SCNC: 18 MMOL/L (ref 21–32)
CO2 SERPL-SCNC: 21 MMOL/L (ref 21–32)
CREAT SERPL-MCNC: 0.34 MG/DL (ref 0.5–1.05)
CREAT SERPL-MCNC: 0.36 MG/DL (ref 0.5–1.05)
EGFRCR SERPLBLD CKD-EPI 2021: >90 ML/MIN/1.73M*2
EGFRCR SERPLBLD CKD-EPI 2021: >90 ML/MIN/1.73M*2
ERYTHROCYTE [DISTWIDTH] IN BLOOD BY AUTOMATED COUNT: 12.2 % (ref 11.5–14.5)
GLUCOSE SERPL-MCNC: 113 MG/DL (ref 74–99)
GLUCOSE SERPL-MCNC: 114 MG/DL (ref 74–99)
HBV SURFACE AG SERPL QL IA: NONREACTIVE
HCT VFR BLD AUTO: 36.6 % (ref 36–46)
HCV AB SER QL: NONREACTIVE
HGB BLD-MCNC: 12 G/DL (ref 12–16)
HIV 1+2 AB+HIV1 P24 AG SERPL QL IA: NONREACTIVE
MAGNESIUM SERPL-MCNC: 2.02 MG/DL (ref 1.6–2.4)
MCH RBC QN AUTO: 29.5 PG (ref 26–34)
MCHC RBC AUTO-ENTMCNC: 32.8 G/DL (ref 32–36)
MCV RBC AUTO: 90 FL (ref 80–100)
NRBC BLD-RTO: 0 /100 WBCS (ref 0–0)
PLATELET # BLD AUTO: 270 X10*3/UL (ref 150–450)
POC APPEARANCE, URINE: CLEAR
POC APPEARANCE, URINE: CLEAR
POC BILIRUBIN, URINE: NEGATIVE
POC BILIRUBIN, URINE: NEGATIVE
POC BLOOD, URINE: NEGATIVE
POC BLOOD, URINE: NEGATIVE
POC COLOR, URINE: YELLOW
POC COLOR, URINE: YELLOW
POC GLUCOSE, URINE: NEGATIVE MG/DL
POC GLUCOSE, URINE: NEGATIVE MG/DL
POC KETONES, URINE: ABNORMAL MG/DL
POC KETONES, URINE: NEGATIVE MG/DL
POC LEUKOCYTES, URINE: NEGATIVE
POC LEUKOCYTES, URINE: NEGATIVE
POC NITRITE,URINE: NEGATIVE
POC NITRITE,URINE: NEGATIVE
POC PH, URINE: 6 PH
POC PH, URINE: 6.5 PH
POC PROTEIN, URINE: ABNORMAL MG/DL
POC PROTEIN, URINE: NEGATIVE MG/DL
POC SPECIFIC GRAVITY, URINE: 1.01
POC SPECIFIC GRAVITY, URINE: 1.02
POC UROBILINOGEN, URINE: 1 EU/DL
POC UROBILINOGEN, URINE: 1 EU/DL
POTASSIUM SERPL-SCNC: 3.2 MMOL/L (ref 3.5–5.3)
POTASSIUM SERPL-SCNC: 3.4 MMOL/L (ref 3.5–5.3)
RBC # BLD AUTO: 4.07 X10*6/UL (ref 4–5.2)
REFLEX ADDED, ANEMIA PANEL: NORMAL
RH FACTOR (ANTIGEN D): NORMAL
RUBV IGG SERPL IA-ACNC: 1.9 IA
RUBV IGG SERPL QL IA: POSITIVE
SODIUM SERPL-SCNC: 137 MMOL/L (ref 136–145)
SODIUM SERPL-SCNC: 137 MMOL/L (ref 136–145)
TREPONEMA PALLIDUM IGG+IGM AB [PRESENCE] IN SERUM OR PLASMA BY IMMUNOASSAY: NONREACTIVE
WBC # BLD AUTO: 9.4 X10*3/UL (ref 4.4–11.3)

## 2025-02-18 PROCEDURE — 76817 TRANSVAGINAL US OBSTETRIC: CPT

## 2025-02-18 PROCEDURE — 87340 HEPATITIS B SURFACE AG IA: CPT

## 2025-02-18 PROCEDURE — 83735 ASSAY OF MAGNESIUM: CPT

## 2025-02-18 PROCEDURE — S4991 NICOTINE PATCH NONLEGEND: HCPCS | Mod: SE

## 2025-02-18 PROCEDURE — 99255 IP/OBS CONSLTJ NEW/EST HI 80: CPT | Performed by: EMERGENCY MEDICINE

## 2025-02-18 PROCEDURE — 2500000004 HC RX 250 GENERAL PHARMACY W/ HCPCS (ALT 636 FOR OP/ED): Mod: SE

## 2025-02-18 PROCEDURE — 2500000002 HC RX 250 W HCPCS SELF ADMINISTERED DRUGS (ALT 637 FOR MEDICARE OP, ALT 636 FOR OP/ED): Mod: SE

## 2025-02-18 PROCEDURE — 99253 IP/OBS CNSLTJ NEW/EST LOW 45: CPT

## 2025-02-18 PROCEDURE — 1210000001 HC SEMI-PRIVATE ROOM DAILY

## 2025-02-18 PROCEDURE — 85027 COMPLETE CBC AUTOMATED: CPT

## 2025-02-18 PROCEDURE — 86780 TREPONEMA PALLIDUM: CPT

## 2025-02-18 PROCEDURE — 87389 HIV-1 AG W/HIV-1&-2 AB AG IA: CPT

## 2025-02-18 PROCEDURE — 76801 OB US < 14 WKS SINGLE FETUS: CPT | Performed by: OBSTETRICS & GYNECOLOGY

## 2025-02-18 PROCEDURE — 76801 OB US < 14 WKS SINGLE FETUS: CPT

## 2025-02-18 PROCEDURE — 2500000001 HC RX 250 WO HCPCS SELF ADMINISTERED DRUGS (ALT 637 FOR MEDICARE OP): Mod: SE

## 2025-02-18 PROCEDURE — 81002 URINALYSIS NONAUTO W/O SCOPE: CPT

## 2025-02-18 PROCEDURE — 2500000005 HC RX 250 GENERAL PHARMACY W/O HCPCS: Mod: SE

## 2025-02-18 PROCEDURE — 76817 TRANSVAGINAL US OBSTETRIC: CPT | Performed by: OBSTETRICS & GYNECOLOGY

## 2025-02-18 PROCEDURE — 36415 COLL VENOUS BLD VENIPUNCTURE: CPT

## 2025-02-18 PROCEDURE — 86803 HEPATITIS C AB TEST: CPT

## 2025-02-18 PROCEDURE — 80051 ELECTROLYTE PANEL: CPT

## 2025-02-18 PROCEDURE — 86317 IMMUNOASSAY INFECTIOUS AGENT: CPT

## 2025-02-18 RX ORDER — LANOLIN ALCOHOL/MO/W.PET/CERES
100 CREAM (GRAM) TOPICAL DAILY
Status: DISCONTINUED | OUTPATIENT
Start: 2025-02-19 | End: 2025-02-19 | Stop reason: HOSPADM

## 2025-02-18 RX ORDER — METOCLOPRAMIDE 10 MG/1
10 TABLET ORAL EVERY 6 HOURS SCHEDULED
Status: DISCONTINUED | OUTPATIENT
Start: 2025-02-18 | End: 2025-02-19 | Stop reason: HOSPADM

## 2025-02-18 RX ORDER — ONDANSETRON 4 MG/1
4 TABLET, FILM COATED ORAL EVERY 6 HOURS SCHEDULED
Status: DISCONTINUED | OUTPATIENT
Start: 2025-02-18 | End: 2025-02-19 | Stop reason: HOSPADM

## 2025-02-18 RX ORDER — ACETAMINOPHEN 325 MG/1
650 TABLET ORAL EVERY 6 HOURS PRN
Status: DISCONTINUED | OUTPATIENT
Start: 2025-02-18 | End: 2025-02-19 | Stop reason: HOSPADM

## 2025-02-18 RX ORDER — PROCHLORPERAZINE EDISYLATE 5 MG/ML
10 INJECTION INTRAMUSCULAR; INTRAVENOUS EVERY 6 HOURS PRN
Status: DISCONTINUED | OUTPATIENT
Start: 2025-02-18 | End: 2025-02-19 | Stop reason: HOSPADM

## 2025-02-18 RX ORDER — DIPHENHYDRAMINE HCL 25 MG
25 CAPSULE ORAL EVERY 6 HOURS
Status: DISCONTINUED | OUTPATIENT
Start: 2025-02-18 | End: 2025-02-19 | Stop reason: HOSPADM

## 2025-02-18 RX ORDER — FAMOTIDINE 20 MG/1
20 TABLET, FILM COATED ORAL 2 TIMES DAILY
Status: DISCONTINUED | OUTPATIENT
Start: 2025-02-18 | End: 2025-02-19 | Stop reason: HOSPADM

## 2025-02-18 RX ORDER — PROCHLORPERAZINE MALEATE 10 MG
10 TABLET ORAL EVERY 6 HOURS PRN
Status: DISCONTINUED | OUTPATIENT
Start: 2025-02-18 | End: 2025-02-19 | Stop reason: HOSPADM

## 2025-02-18 RX ORDER — IBUPROFEN 200 MG
1 TABLET ORAL DAILY
Status: DISCONTINUED | OUTPATIENT
Start: 2025-02-18 | End: 2025-02-19 | Stop reason: HOSPADM

## 2025-02-18 RX ORDER — PROCHLORPERAZINE 25 MG/1
25 SUPPOSITORY RECTAL EVERY 12 HOURS PRN
Status: DISCONTINUED | OUTPATIENT
Start: 2025-02-18 | End: 2025-02-19 | Stop reason: HOSPADM

## 2025-02-18 RX ADMIN — DIPHENHYDRAMINE HYDROCHLORIDE 25 MG: 25 CAPSULE ORAL at 22:35

## 2025-02-18 RX ADMIN — DIPHENHYDRAMINE HYDROCHLORIDE 25 MG: 50 INJECTION INTRAMUSCULAR; INTRAVENOUS at 10:44

## 2025-02-18 RX ADMIN — ASCORBIC ACID, VITAMIN A PALMITATE, CHOLECALCIFEROL, THIAMINE HYDROCHLORIDE, RIBOFLAVIN-5 PHOSPHATE SODIUM, PYRIDOXINE HYDROCHLORIDE, NIACINAMIDE, DEXPANTHENOL, ALPHA-TOCOPHEROL ACETATE, VITAMIN K1, FOLIC ACID, BIOTIN, CYANOCOBALAMIN: 200; 3300; 200; 6; 3.6; 6; 40; 15; 10; 150; 600; 60; 5 INJECTION, SOLUTION INTRAVENOUS at 00:14

## 2025-02-18 RX ADMIN — ONDANSETRON 4 MG: 2 INJECTION INTRAMUSCULAR; INTRAVENOUS at 03:56

## 2025-02-18 RX ADMIN — METOCLOPRAMIDE 10 MG: 5 INJECTION, SOLUTION INTRAMUSCULAR; INTRAVENOUS at 12:44

## 2025-02-18 RX ADMIN — ACETAMINOPHEN 650 MG: 325 TABLET ORAL at 22:35

## 2025-02-18 RX ADMIN — METOCLOPRAMIDE 10 MG: 10 TABLET ORAL at 20:26

## 2025-02-18 RX ADMIN — PRENATAL VIT W/ FE FUMARATE-FA TAB 27-0.8 MG 1 TABLET: 27-0.8 TAB at 08:38

## 2025-02-18 RX ADMIN — CAPSAICIN: 0.75 CREAM TOPICAL at 00:14

## 2025-02-18 RX ADMIN — ONDANSETRON HYDROCHLORIDE 4 MG: 4 TABLET, FILM COATED ORAL at 22:34

## 2025-02-18 RX ADMIN — NICOTINE 1 PATCH: 21 PATCH, EXTENDED RELEASE TRANSDERMAL at 08:42

## 2025-02-18 RX ADMIN — ONDANSETRON 4 MG: 2 INJECTION INTRAMUSCULAR; INTRAVENOUS at 16:53

## 2025-02-18 RX ADMIN — SCOPOLAMINE 1 PATCH: 1.5 PATCH, EXTENDED RELEASE TRANSDERMAL at 00:14

## 2025-02-18 RX ADMIN — DIPHENHYDRAMINE HYDROCHLORIDE 25 MG: 50 INJECTION INTRAMUSCULAR; INTRAVENOUS at 16:53

## 2025-02-18 RX ADMIN — METOCLOPRAMIDE 10 MG: 5 INJECTION, SOLUTION INTRAMUSCULAR; INTRAVENOUS at 05:38

## 2025-02-18 RX ADMIN — ONDANSETRON 4 MG: 2 INJECTION INTRAMUSCULAR; INTRAVENOUS at 10:44

## 2025-02-18 RX ADMIN — DIPHENHYDRAMINE HYDROCHLORIDE 25 MG: 50 INJECTION INTRAMUSCULAR; INTRAVENOUS at 03:56

## 2025-02-18 RX ADMIN — SODIUM CHLORIDE, SODIUM LACTATE, POTASSIUM CHLORIDE, CALCIUM CHLORIDE AND DEXTROSE MONOHYDRATE 125 ML/HR: 5; 600; 310; 30; 20 INJECTION, SOLUTION INTRAVENOUS at 02:28

## 2025-02-18 RX ADMIN — FAMOTIDINE 20 MG: 20 TABLET, FILM COATED ORAL at 20:26

## 2025-02-18 RX ADMIN — PANTOPRAZOLE SODIUM 40 MG: 40 INJECTION, POWDER, LYOPHILIZED, FOR SOLUTION INTRAVENOUS at 08:38

## 2025-02-18 SDOH — SOCIAL STABILITY: SOCIAL INSECURITY: WITHIN THE LAST YEAR, HAVE YOU BEEN HUMILIATED OR EMOTIONALLY ABUSED IN OTHER WAYS BY YOUR PARTNER OR EX-PARTNER?: NO

## 2025-02-18 SDOH — SOCIAL STABILITY: SOCIAL INSECURITY: DOES ANYONE TRY TO KEEP YOU FROM HAVING/CONTACTING OTHER FRIENDS OR DOING THINGS OUTSIDE YOUR HOME?: NO

## 2025-02-18 SDOH — SOCIAL STABILITY: SOCIAL INSECURITY: ABUSE: ADULT

## 2025-02-18 SDOH — SOCIAL STABILITY: SOCIAL INSECURITY: HAS ANYONE EVER THREATENED TO HURT YOUR FAMILY OR YOUR PETS?: NO

## 2025-02-18 SDOH — SOCIAL STABILITY: SOCIAL INSECURITY: ARE THERE ANY APPARENT SIGNS OF INJURIES/BEHAVIORS THAT COULD BE RELATED TO ABUSE/NEGLECT?: NO

## 2025-02-18 SDOH — ECONOMIC STABILITY: FOOD INSECURITY: HOW HARD IS IT FOR YOU TO PAY FOR THE VERY BASICS LIKE FOOD, HOUSING, MEDICAL CARE, AND HEATING?: SOMEWHAT HARD

## 2025-02-18 SDOH — ECONOMIC STABILITY: FOOD INSECURITY
WITHIN THE PAST 12 MONTHS, YOU WORRIED THAT YOUR FOOD WOULD RUN OUT BEFORE YOU GOT THE MONEY TO BUY MORE.: SOMETIMES TRUE

## 2025-02-18 SDOH — ECONOMIC STABILITY: FOOD INSECURITY: WITHIN THE PAST 12 MONTHS, THE FOOD YOU BOUGHT JUST DIDN'T LAST AND YOU DIDN'T HAVE MONEY TO GET MORE.: SOMETIMES TRUE

## 2025-02-18 SDOH — SOCIAL STABILITY: SOCIAL INSECURITY: WITHIN THE LAST YEAR, HAVE YOU BEEN AFRAID OF YOUR PARTNER OR EX-PARTNER?: NO

## 2025-02-18 SDOH — SOCIAL STABILITY: SOCIAL INSECURITY: HAVE YOU HAD THOUGHTS OF HARMING ANYONE ELSE?: NO

## 2025-02-18 SDOH — SOCIAL STABILITY: SOCIAL INSECURITY: ARE YOU OR HAVE YOU BEEN THREATENED OR ABUSED PHYSICALLY, EMOTIONALLY, OR SEXUALLY BY ANYONE?: NO

## 2025-02-18 SDOH — SOCIAL STABILITY: SOCIAL INSECURITY: DO YOU FEEL UNSAFE GOING BACK TO THE PLACE WHERE YOU ARE LIVING?: NO

## 2025-02-18 SDOH — SOCIAL STABILITY: SOCIAL INSECURITY: WERE YOU ABLE TO COMPLETE ALL THE BEHAVIORAL HEALTH SCREENINGS?: YES

## 2025-02-18 SDOH — SOCIAL STABILITY: SOCIAL INSECURITY: DO YOU FEEL ANYONE HAS EXPLOITED OR TAKEN ADVANTAGE OF YOU FINANCIALLY OR OF YOUR PERSONAL PROPERTY?: NO

## 2025-02-18 SDOH — ECONOMIC STABILITY: TRANSPORTATION INSECURITY: IN THE PAST 12 MONTHS, HAS LACK OF TRANSPORTATION KEPT YOU FROM MEDICAL APPOINTMENTS OR FROM GETTING MEDICATIONS?: NO

## 2025-02-18 ASSESSMENT — COGNITIVE AND FUNCTIONAL STATUS - GENERAL
PATIENT BASELINE BEDBOUND: NO
MOBILITY SCORE: 24
PATIENT BASELINE BEDBOUND: NO
DAILY ACTIVITIY SCORE: 24

## 2025-02-18 ASSESSMENT — PAIN SCALES - GENERAL
PAINLEVEL_OUTOF10: 0 - NO PAIN
PAINLEVEL_OUTOF10: 2
PAINLEVEL_OUTOF10: 2
PAINLEVEL_OUTOF10: 0 - NO PAIN

## 2025-02-18 ASSESSMENT — ACTIVITIES OF DAILY LIVING (ADL)
HEARING - LEFT EAR: FUNCTIONAL
PATIENT'S MEMORY ADEQUATE TO SAFELY COMPLETE DAILY ACTIVITIES?: YES
JUDGMENT_ADEQUATE_SAFELY_COMPLETE_DAILY_ACTIVITIES: YES
WALKS IN HOME: INDEPENDENT
BATHING: INDEPENDENT
TOILETING: INDEPENDENT
LACK_OF_TRANSPORTATION: NO
HEARING - RIGHT EAR: FUNCTIONAL
GROOMING: INDEPENDENT
FEEDING YOURSELF: INDEPENDENT
LACK_OF_TRANSPORTATION: NO
DRESSING YOURSELF: INDEPENDENT
ADEQUATE_TO_COMPLETE_ADL: YES

## 2025-02-18 ASSESSMENT — PAIN DESCRIPTION - DESCRIPTORS: DESCRIPTORS: OTHER (COMMENT)

## 2025-02-18 ASSESSMENT — LIFESTYLE VARIABLES
HOW OFTEN DO YOU HAVE A DRINK CONTAINING ALCOHOL: NEVER
TOTAL_SCORE: 4
EVER FELT BAD OR GUILTY ABOUT YOUR DRINKING: NO
TOTAL_SCORE: 2
TOTAL_SCORE: 4
TOTAL_SCORE: 4
AUDIT-C TOTAL SCORE: 0
TOTAL SCORE: 0
AUDIT-C TOTAL SCORE: 0
TOTAL_SCORE: 0
TOTAL_SCORE: 4
HAVE YOU EVER FELT YOU SHOULD CUT DOWN ON YOUR DRINKING: NO
EVER HAD A DRINK FIRST THING IN THE MORNING TO STEADY YOUR NERVES TO GET RID OF A HANGOVER: NO
HOW MANY STANDARD DRINKS CONTAINING ALCOHOL DO YOU HAVE ON A TYPICAL DAY: PATIENT DOES NOT DRINK
SKIP TO QUESTIONS 9-10: 1
TOTAL_SCORE: 2
HAVE PEOPLE ANNOYED YOU BY CRITICIZING YOUR DRINKING: NO
TOTAL_SCORE: 1
HOW OFTEN DO YOU HAVE 6 OR MORE DRINKS ON ONE OCCASION: NEVER
TOTAL_SCORE: 4

## 2025-02-18 ASSESSMENT — PAIN - FUNCTIONAL ASSESSMENT
PAIN_FUNCTIONAL_ASSESSMENT: 0-10

## 2025-02-18 ASSESSMENT — PATIENT HEALTH QUESTIONNAIRE - PHQ9
1. LITTLE INTEREST OR PLEASURE IN DOING THINGS: MORE THAN HALF THE DAYS
SUM OF ALL RESPONSES TO PHQ9 QUESTIONS 1 & 2: 4
2. FEELING DOWN, DEPRESSED OR HOPELESS: MORE THAN HALF THE DAYS

## 2025-02-18 ASSESSMENT — PAIN DESCRIPTION - LOCATION: LOCATION: ABDOMEN

## 2025-02-18 NOTE — CONSULTS
Medical Toxicology Initial Consult Note    Inpatient consult to Toxicology  Consult performed by: Christina Rodriguez MD  Consult ordered by: Damaris Dunne MD  Reason for consult: Kratom withdrawal      History Of Present Illness  Lizet Storm is a 29 y.o. female  presenting with nausea and vomiting. Medical toxicology concerned for Kratom withdrawal.    She reports that she took kratom daily throughout December and January.  She did check into a detox facility at the end of January where she was started on buprenorphine and then titrated off prior to discharge.  A few days later she started having a return of nausea and vomiting.  She did find out she was pregnant, which was good news to her as she has been trying to conceive for the past year, but then she got worried about a possible miscarriage and used kratom on .  Symptoms got worse throughout the week I am and she presented to the emergency department on 216 due to new continued nausea and vomiting.  She was transferred to [facility] given the OB resources.    She was given 2 mg of buprenorphine last night in the ED due to concern of Kratom withdrawal which resolved her symptoms within 20 minutes. She reports she was also given Zofran but Zofran has not previously helped with her symptoms so she does not think the Zofran has much effect.    Buprenorphine stopped by primary team this morning as COWS 0. Has been maintained on antiemetics for hyperemesis gravidarum with good response.    She does not have significant withdrawal symptoms now. She reports her symptoms prior to presentation where only somewhat consistent with previous withdrawal, notably more vomiting and less abdominal cramping. Yet, she still endorses significant improvement after taking the buprenorphine.    She reports that roughly 10 years ago she did use IV heroin, but has been abstinent since then.  A few years later she did have an alcohol use disorder, but is now alcohol free.  She  recognized rather quickly after starting kratom use its addiction potential which is why she checked into the rehabilitation center at the end of January.    She reports that she has a significant history of substance use disorder in her family and her that her father  from opioid overdose.    She does also use marijuana daily but stopped when she found out that she was pregnant.  She also does use tobacco and is receiving treatment from the primary team for nicotine use disorder.     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    Surgical History  She has a past surgical history that includes Cosmetic surgery (); Cervical biopsy w/ loop electrode excision; and Tonsillectomy.     Social History  She reports that she has been smoking cigarettes. She has never been exposed to tobacco smoke. She has never used smokeless tobacco. She reports that she does not currently use alcohol. She reports current drug use. Drug: Marijuana.    Family History  No family history on file.     Allergies  Patient has no known allergies.    Review of Systems   All other systems reviewed and are negative.       Objective  Last Recorded Vitals  /66 (BP Location: Right arm, Patient Position: Lying)   Pulse 62   Temp 36.3 °C (97.3 °F) (Temporal)   Resp 18   Wt 77.1 kg (170 lb)   SpO2 99%     Physical Exam  Vital signs and nursing notes reviewed.   General: Alert, cooperative, well-appearing, no acute distress  HEENT: Normocephalic, atraumatic, normal nares without rhinorrhea, normal auricles without otorrhea, pupils are 4 mm bilaterally, equal round and reactive, EOMI, no conjunctival injection, no scleral icterus, moist mucous membranes  Cardiovascular: Regular rate and rhythm, radial pulses 2+ bilaterally   Pulmonary: Non-labored breathing  Abdomen: Soft, non-tender, non-distended  Musculoskeletal: Normal muscle tone and bulk, no lower extremity  edema  Neurologic: CN2-12 grossly intact, normal strength throughout, no focal sensory deficit noted, no tremor, no hyperreflexia, no clonus  Skin: Clean, dry, and intact without lesion or rash      Relevant Results  Acetaminophen, salicylate, ethanol negative  UDS positive for barbiturates, THC,  Serial BMPs with hypokalemia, otherwise unremarkable  CBC with mild leukocytosis, otherwise unremarkable  Lipase normal    Problem List  Kratom use disorder    Assessment:  Lizet Storm is a 29 y.o. female on day 1 of admission presenting with nausea and vomiting concerning for kratom withdrawal.    Kratom is an alkaloid that works at multiple neuro receptors, most concerned only including the opioid receptor.  At low doses, creatinine is more of a stimulant along the lines of caffeine or cocaine, but at higher doses it does have significant analgesic effects due primarily to its action on the opioid receptor.  Thus, kratom withdrawal is primarily consistent with opioid withdrawal, with nausea, vomiting, abdominal cramping, diarrhea, tachycardia, hypertension, dilated pupils, diaphoresis, and irritability.  Treatment is primarily with buprenorphine, with induction following the same principles as traditional opioid use disorder.    Cause of her nausea and vomiting is unclear, but most likely multifactorial.  She is early in pregnancy, has recently stopped marijuana use, had recently been quickly down tritrated  from buprenorphine, and recently used kratom.  Typically, we do not recommend quick de-escalation of buprenorphine due to the risk of withdrawal, so I am concerned that the nausea and vomiting that she had a few days after leaving the detox center was continued opioid withdrawal, especially given buprenorphine's long half-life.  She also re-exposed herself to kratom, which is more short acting meaning that withdrawal can occur more quickly.  Withdrawal can be a multi-day event from both opioids and kratom.  She  also reports significant improvement after getting the buprenorphine, which does lead to some component of her initial presentation being creatinine withdrawal.    That said, the component of her presentation that can be attributed to kratom withdrawal is likely low, given that her symptoms completely resolved with a very low dose of buprenorphine, only 2 mg.  She otherwise has been feeling well on the antiemetic regimen provided by the obstetrics team.  I had a long discussion with the patient at bedside, discussing the risks and benefits of continuing buprenorphine.  Prior to the [facility] of buprenorphine, opioid withdrawal was somewhat successfully treated with supportive cares, and given that she only needed a low-dose of buprenorphine to resolve her symptoms, she and I both thought it was reasonable to focus on symptomatic treatment for what may be the remainder of her kratom withdrawal versus nausea and vomiting with pregnancy versus cannabis hyperemesis syndrome.      Discussed that if she does feel like her symptoms are related to withdrawal again, she can return to the [facility] emergency department at any point for reinitiation of buprenorphine.  We did discuss what long-term buprenorphine use during pregnancy would entail including the risk of  abstinence syndrome, which is typically more mild and patient to use buprenorphine.  The patient was agreeable with this plan of care at the end of our discussion.    Recommendations:  - Okay to stop buprenorphine after risk and benefits discussion patient  - Continue symptomatic management per primary team    Thank you for allowing us to participate in this patient's care. Medical toxicology will sign off at this time. Please contact the medical  on call through [employer] Secure Chat ([facility] Medical Toxicology) for questions, concerns, or changes in patient status.    Christina Rodriguez MD  Medical Toxicology   Attending Physician    I spent 75 minutes in the  professional and overall care of this patient.

## 2025-02-18 NOTE — ED TRIAGE NOTES
Patient is approx 5 weeks OB presented to [facility] for n/v. Patient recently relapsed on kratom and also tested positive for marijuana and barbiturates. Provider @ [facility] concerned for withdrawal being the cause of n/v. Patient denies abdominal pain, vaginal bleeding.

## 2025-02-18 NOTE — ED PROVIDER NOTES
HPI   Chief Complaint   Patient presents with   • Vomiting       HPI  Patient is a 29-year-old G1,P0 female at 5 weeks 3 days gestation who presents to the emergency room as a transfer from [facility] for concerns of kratom and marijuana withdrawal.  Patient states that she had gotten hooked on kratom a few months ago and then went through detox at [facility].  She states that she then had a relapse on January 28 and then did not have any more pills until she relapsed again on February 14.  She states she took 4 kratom pills on February 14.  She states that over the past couple days she has started showing signs that she had prior to going to the detox.      Patient History   Past Medical History:   Diagnosis Date   • Cervical dysplasia    • Recovering alcoholic in remission (Multi)      Past Surgical History:   Procedure Laterality Date   • CERVICAL BIOPSY  W/ LOOP ELECTRODE EXCISION     • COSMETIC SURGERY  2022    BBL & lipo   • TONSILLECTOMY       No family history on file.  Social History     Tobacco Use   • Smoking status: Every Day     Current packs/day: 0.50     Types: Cigarettes     Passive exposure: Never   • Smokeless tobacco: Never   • Tobacco comments:     Patient is a willing participant in Detox programs. Desires a family.    Vaping Use   • Vaping status: Every Day   • Substances: Nicotine, THC, CBD, Flavoring   • Devices: Disposable, Pre-filled or refillable cartridge   Substance Use Topics   • Alcohol use: Not Currently     Comment: States used to be a really bad alcoholic   • Drug use: Yes     Types: Marijuana     Comment: THC (Heavy), Kratum (heavy-detox at [facility]). Hx of Heroin abuse - recovered.       Physical Exam   ED Triage Vitals [02/17/25 1923]   Temperature Heart Rate Respirations BP   36.5 °C (97.7 °F) (!) 106 18 (!) 130/96      Pulse Ox Temp Source Heart Rate Source Patient Position   98 % Oral -- --      BP Location FiO2 (%)     -- --       Physical Exam  Vitals and nursing note reviewed.    Constitutional:       General: She is not in acute distress.     Appearance: She is well-developed.   HENT:      Head: Normocephalic and atraumatic.   Eyes:      Conjunctiva/sclera: Conjunctivae normal.   Cardiovascular:      Rate and Rhythm: Regular rhythm. Tachycardia present.      Pulses: Normal pulses.      Heart sounds: No murmur heard.  Pulmonary:      Effort: Pulmonary effort is normal. No respiratory distress.      Breath sounds: Normal breath sounds.   Abdominal:      Palpations: Abdomen is soft.      Tenderness: There is no abdominal tenderness.      Comments: Abdomen soft nontender to palpation   Musculoskeletal:         General: No swelling.      Cervical back: Neck supple.      Right lower leg: No edema.      Left lower leg: No edema.   Skin:     General: Skin is warm and dry.      Capillary Refill: Capillary refill takes less than 2 seconds.   Neurological:      Mental Status: She is alert and oriented to person, place, and time.      GCS: GCS eye subscore is 4. GCS verbal subscore is 5. GCS motor subscore is 6.      Cranial Nerves: Cranial nerves 2-12 are intact.      Sensory: Sensation is intact.      Motor: Motor function is intact.   Psychiatric:         Mood and Affect: Mood is anxious.         Speech: Speech normal.         Behavior: Behavior is cooperative.      Comments: Patient was cooperative but fidgety during exam     ED Course & MDM   Diagnoses as of 02/17/25 2303   Withdrawal complaint   Hyperemesis gravidarum ([facility])       Medical Decision Making  Patient is a 29-year-old G1,P0 female at 5 weeks 3 days gestation who presents to the emergency room as a transfer from [facility] for concerns of kratom and marijuana withdrawal.  Patient was slightly tachycardic with a heart rate of 106 bpm with other vital stable within normal limits upon presentation.  Patient fidgety during exam and stated she was feeling slightly nauseous, but otherwise well-appearing.  OB was consulted and agreed to  see the patient.  Toxicology was also consulted and stated that the patient should be started on an opioid withdrawal order set which included buprenorphine induction.  Patient started on buprenorphine and duction.  Patient admitted to OB under Dr. Dunne in stable condition for continued treatment for withdrawal symptoms.  Patient understood and was agreeable to plan.    Procedure  Procedures none     Rick Matt DO  Resident  02/17/25 2983

## 2025-02-18 NOTE — CONSULTS
MFM Consult    Reason for Consult: Opioid withdrawal, nausea/vomiting in pregnancy    HPI:   Patient is a 30yo  at 5w3d by LMP who presents as a transfer from [facility]. Patient initially presented to [facility] ED with intractable nausea and vomiting and was transferred to [facility] for antepartum admission. Patient thought to be in withdrawal given recent Kratom use. She reports taking Kratom every day for 2 months. She then checked herself into a detox facility where she was given buprenorphine and zofran. She was there from -. She was not discharged home on any medications as she declined being on MAT. She states she felt well until  when she started becoming increasingly nauseous. Since then she has not been able to keep much food or water down. She does report using Kratom on  but states she felt worse after taking it. She reports taking B6 and Zofran at home with mild relief. She also reports capsaicin cream and hot showers improve her symptoms. She does report daily marijuana use for the past two years. She reports some very mild cramping but no vaginal bleeding. She reports some upper abdominal pain that has improved with lidocaine patches. She denies any fevers, chills, urinary, or bowel symptoms.     OBHx: G1  PMH: JUSTINA (Kratom), alcohol use disorder (quit 1.5 years ago), depression (not meds)  PSH: liposuction  Meds: none  Allergies: none  Soc: Daily tobacco use, denies recent ETOH use, see above for JUSTINA    Pregnancy Problems (from 23 to present)       Problem Noted Diagnosed Resolved    Nausea and vomiting in pregnancy 2025 by Shanell Rodney MD  No    Priority:  Medium               Obstetrical History   OB History          1    Para   0    Term   0       0    AB   0    Living   0         SAB   0    IAB   0    Ectopic   0    Multiple   0    Live Births   0                 Past Medical History  Past Medical History:   Diagnosis Date    Cervical dysplasia      Recovering alcoholic in remission (Multi)         Past Surgical History   Past Surgical History:   Procedure Laterality Date    CERVICAL BIOPSY  W/ LOOP ELECTRODE EXCISION      COSMETIC SURGERY  2022    BBL & lipo    TONSILLECTOMY         Social History  Social History     Socioeconomic History    Marital status: Significant Other     Spouse name: Not on file    Number of children: Not on file    Years of education: Not on file    Highest education level: Not on file   Occupational History    Not on file   Tobacco Use    Smoking status: Every Day     Current packs/day: 0.50     Types: Cigarettes     Passive exposure: Never    Smokeless tobacco: Never    Tobacco comments:     Patient is a willing participant in Detox programs. Desires a family.    Vaping Use    Vaping status: Every Day    Substances: Nicotine, THC, CBD, Flavoring    Devices: Disposable, Pre-filled or refillable cartridge   Substance and Sexual Activity    Alcohol use: Not Currently     Comment: States used to be a really bad alcoholic    Drug use: Yes     Types: Marijuana     Comment: THC (Heavy), Kratum (heavy-detox at [facility]). Hx of Heroin abuse - recovered.    Sexual activity: Yes     Partners: Male     Birth control/protection: None   Other Topics Concern    Not on file   Social History Narrative    Not on file     Social Drivers of Health     Financial Resource Strain: High Risk (2/17/2025)    Overall Financial Resource Strain (CARDIA)     Difficulty of Paying Living Expenses: Hard   Food Insecurity: No Food Insecurity (2/17/2025)    Hunger Vital Sign     Worried About Running Out of Food in the Last Year: Never true     Ran Out of Food in the Last Year: Never true   Transportation Needs: No Transportation Needs (2/17/2025)    PRAPARE - Transportation     Lack of Transportation (Medical): No     Lack of Transportation (Non-Medical): No   Physical Activity: Not on File (6/1/2021)    Received from [facility]    Physical Activity     Physical  "Activity: 0   Stress: No Stress Concern Present (2025)    [employer] - Occupational Stress Questionnaire     Feeling of Stress : Not at all   Social Connections: Not on File (2023)    Received from [facility]    Social Connections     Connectedness: 0   Intimate Partner Violence: Not At Risk (2025)    Humiliation, Afraid, Rape, and Kick questionnaire     Fear of Current or Ex-Partner: No     Emotionally Abused: No     Physically Abused: No     Sexually Abused: No       Allergies  No Known Allergies    Medications  (Not in a hospital admission)      OBJECTIVE:   /85 (Patient Position: Lying)   Pulse 81   Temp 36.5 °C (97.7 °F) (Oral)   Resp 18   Ht 1.702 m (5' 7\")   Wt 77.1 kg (170 lb)   LMP 01/10/2025 (Exact Date)   SpO2 97%   BMI 26.63 kg/m²    Temp  Min: 36.5 °C (97.7 °F)  Max: 37.6 °C (99.7 °F)  Pulse  Min: 76  Max: 106  BP  Min: 127/81  Max: 142/112    Physical exam:  General: AAOx3, No acute distress  Cardiovascular: Warm and well perfused  Respiratory: Normal respiratory effort   Abdominal:  Soft, gravid, non-tender, no rebound or guarding, no palpable contractions     ASSESSMENT AND PLAN:     29 y.o.  at 5w4d by LMP with opioid withdrawal and nausea/vomiting of pregnancy.     Nausea and vomiting in pregnancy  Patient with intractable nausea and vomiting and has had 10lb weight loss in last 12 days. Patient afebrile, without other signs/sxs of infectious etiology. Suspect 2/2 cannabinoid withdrawal   - mIVF: D5LR@125  - Daily thiamine and IV multivitamin  - Scheduled IV antiemetics: IV reglan, zofran, benadryl with PRN phenergan, compazine. Scopolamine patch. Capsaicin cream. Transition to PO pain meds as appropriate.   - IV PPI  - Lidocaine patch for upper abd pain from cyclical vomiting  - QTc - EKG pending  - BMP pending. Trend labs, replete electrolytes as needed.  - UA notable for 3+ ketones, continue D5-LR. Qshift Udips until ketones cleared  - " Self-limited regular diet  - Bowel regimen  - Nutrition consult     Kratom Use Disorder   Kratom use daily x2 months. Pt reports checking into detox facility from 1/28-2/1. Last reported use 2/14.  - COWS protocol and Opioid withdrawal order set initiated on admission, held until Toxicology consult     Tobacco use   - Desires to cut down/quit  - Nicotine patches ordered  - Discussed Moms Quit for 2, pt interested     Routine PNC  - POCUS in ED showing gestational sac and yolk sac  - SSUS in AM to confirm dating  - Prenatal panel ordered    Dispo: continue inpatient management    Pt to be seen and discussed with MFM Attending, Dr. Elyse Santamaria MD, PGY-3  [facility]  Pager [phone]

## 2025-02-18 NOTE — H&P
OB Admission H&P    Assessment/Plan    Lizet Storm is a 29 y.o.  at 5w3d by LMP.     Diagnosis: Opioid withdrawal, nausea/vomiting in pregnancy    Nausea and vomiting in pregnancy  Patient with intractable nausea and vomiting and has had 10lb weight loss in last 12 days. Patient afebrile, without other signs/sxs of infectious etiology. Suspect component of opioid withdrawal as well as cyclic cannabinoid vomiting.  - mIVF: D5LR@125  - Daily thiamine and IV multivitamin  - Scheduled IV antiemetics: IV reglan, zofran, benadryl with PRN phenergan, compazine. Scopolamine patch. Capsaicin cream. Transition to PO pain meds as appropriate.   - IV PPI  - Lidocaine patch for upper abd pain from cyclical vomiting  - QTc - EKG pending  - BMP pending. Trend labs, replete electrolytes as needed.  - UA notable for 3+ ketones, continue D5-LR. Qshift Udips until ketones cleared  - Self-limited regular diet  - Bowel regimen  - Nutrition consult    Opioid withdrawal  Kratom use daily x2 months. Pt reports checking into detox facility from -. Last reported use .  - COWS protocol  - Opioid withdrawal order set: subutex per protocol  - Toxicology consulted, to see in AM    Tobacco use   -Desires to cut down/quit  -Nicotine patches ordered  -Discussed Moms Quit for 2, pt interested     Routine PNC  -POCUS in ED showing gestational sac and yolk sac  -SSUS in AM to confirm dating  -Prenatal panel ordered    Admit to [facility] service for inpatient management.    Seen and discussed w/ Dr. Vibha Rodney MD PGY-2  Obstetrics & Gynecology      Pregnancy Problems (from 23 to present)       Problem Noted Diagnosed Resolved    Nausea and vomiting in pregnancy 2025 by Shanell Rodney MD  No    Priority:  Medium               Subjective   Patient is a 30yo  at 5w3d by LMP who presents as a transfer from [facility]. Patient initially presented to [facility] ED with intractable nausea and vomiting and was  transferred to [facility] for antepartum admission. Patient thought to be in withdrawal given recent Kratom use. She reports taking Kratom every day for 2 months. She then checked herself into a detox facility where she was given buprenorphine and zofran. She was there from -. She was not discharged home on any medications as she declined being on MAT. She states she felt well until  when she started becoming increasingly nauseous. Since then she has not been able to keep much food or water down. She does report using Kratom on  but states she felt worse after taking it. She reports taking B6 and Zofran at home with mild relief. She also reports capsaicin cream and hot showers improve her symptoms. She does report daily marijuana use for the past two years. She reports some very mild cramping but no vaginal bleeding. She reports some upper abdominal pain that has improved with lidocaine patches. She denies any fevers, chills, urinary, or bowel symptoms.    OBHx: G1  PMH: JUSTINA (Kratom), alcohol use disorder (quit 1.5 years ago), depression  PSH: liposuction  Meds: none  Allergies: none  Soc: Daily tobacco use, denies recent ETOH use, see above for JUSTINA    OB History    Para Term  AB Living   1 0 0 0 0 0   SAB IAB Ectopic Multiple Live Births   0 0 0 0 0      # Outcome Date GA Lbr Odell/2nd Weight Sex Type Anes PTL Lv   1 Current                Past Surgical History:   Procedure Laterality Date    CERVICAL BIOPSY  W/ LOOP ELECTRODE EXCISION      COSMETIC SURGERY      BBL & lipo    TONSILLECTOMY         Social History     Tobacco Use    Smoking status: Every Day     Current packs/day: 0.50     Types: Cigarettes     Passive exposure: Never    Smokeless tobacco: Never    Tobacco comments:     Patient is a willing participant in Detox programs. Desires a family.    Substance Use Topics    Alcohol use: Not Currently     Comment: States used to be a really bad alcoholic       No Known  Allergies    (Not in a hospital admission)    Objective     Last Vitals  Temp Pulse Resp BP MAP O2 Sat   36.5 °C (97.7 °F) 81 18 134/85   97 %     Blood Pressures         2/17/2025  1923 2/17/2025  2225          BP: 130/96 134/85               Physical Exam  General: NAD, mood appropriate  Cardiopulmonary: warm and well perfused, breathing comfortably on room air  Abdomen: Gravid, non-tender  Extremities: Symmetric     Imaging  POCUS in ED showing gestational sac and yolk sac.    Results from last 7 days   Lab Units 02/16/25  2154   WBC AUTO x10*3/uL 15.8*   HEMOGLOBIN g/dL 14.6   HEMATOCRIT % 41.4   PLATELETS AUTO x10*3/uL 318   AST U/L 14   ALT U/L 29   CREATININE mg/dL 0.49*

## 2025-02-19 ENCOUNTER — TELEPHONE (OUTPATIENT)
Dept: OBSTETRICS AND GYNECOLOGY | Facility: CLINIC | Age: 30
End: 2025-02-19
Payer: COMMERCIAL

## 2025-02-19 ENCOUNTER — PHARMACY VISIT (OUTPATIENT)
Dept: PHARMACY | Facility: CLINIC | Age: 30
End: 2025-02-19
Payer: MEDICAID

## 2025-02-19 VITALS
RESPIRATION RATE: 16 BRPM | TEMPERATURE: 98.8 F | OXYGEN SATURATION: 99 % | HEART RATE: 78 BPM | BODY MASS INDEX: 26.68 KG/M2 | HEIGHT: 67 IN | WEIGHT: 170 LBS | SYSTOLIC BLOOD PRESSURE: 120 MMHG | DIASTOLIC BLOOD PRESSURE: 78 MMHG

## 2025-02-19 DIAGNOSIS — Z32.01 PREGNANCY TEST POSITIVE (HHS-HCC): ICD-10-CM

## 2025-02-19 LAB
ANION GAP SERPL CALC-SCNC: 10 MMOL/L (ref 10–20)
BUN SERPL-MCNC: 8 MG/DL (ref 6–23)
CALCIUM SERPL-MCNC: 8.3 MG/DL (ref 8.6–10.6)
CHLORIDE SERPL-SCNC: 108 MMOL/L (ref 98–107)
CO2 SERPL-SCNC: 23 MMOL/L (ref 21–32)
CREAT SERPL-MCNC: 0.46 MG/DL (ref 0.5–1.05)
EGFRCR SERPLBLD CKD-EPI 2021: >90 ML/MIN/1.73M*2
GLUCOSE SERPL-MCNC: 96 MG/DL (ref 74–99)
MAGNESIUM SERPL-MCNC: 2.06 MG/DL (ref 1.6–2.4)
POTASSIUM SERPL-SCNC: 3.7 MMOL/L (ref 3.5–5.3)
SODIUM SERPL-SCNC: 137 MMOL/L (ref 136–145)

## 2025-02-19 PROCEDURE — 99238 HOSP IP/OBS DSCHRG MGMT 30/<: CPT

## 2025-02-19 PROCEDURE — 2500000004 HC RX 250 GENERAL PHARMACY W/ HCPCS (ALT 636 FOR OP/ED): Mod: SE

## 2025-02-19 PROCEDURE — 36415 COLL VENOUS BLD VENIPUNCTURE: CPT

## 2025-02-19 PROCEDURE — 2500000002 HC RX 250 W HCPCS SELF ADMINISTERED DRUGS (ALT 637 FOR MEDICARE OP, ALT 636 FOR OP/ED): Mod: SE

## 2025-02-19 PROCEDURE — S4991 NICOTINE PATCH NONLEGEND: HCPCS | Mod: SE

## 2025-02-19 PROCEDURE — 2500000001 HC RX 250 WO HCPCS SELF ADMINISTERED DRUGS (ALT 637 FOR MEDICARE OP): Mod: SE

## 2025-02-19 PROCEDURE — 80048 BASIC METABOLIC PNL TOTAL CA: CPT

## 2025-02-19 PROCEDURE — RXMED WILLOW AMBULATORY MEDICATION CHARGE

## 2025-02-19 PROCEDURE — 83735 ASSAY OF MAGNESIUM: CPT

## 2025-02-19 RX ORDER — CAPSAICIN 0.75 MG/G
CREAM TOPICAL 2 TIMES DAILY
Qty: 114 G | Refills: 0 | Status: SHIPPED | OUTPATIENT
Start: 2025-02-19

## 2025-02-19 RX ORDER — IBUPROFEN 200 MG
1 TABLET ORAL DAILY
Qty: 28 PATCH | Refills: 0 | Status: SHIPPED | OUTPATIENT
Start: 2025-02-19 | End: 2025-03-21

## 2025-02-19 RX ORDER — DIPHENHYDRAMINE HCL 25 MG
25 CAPSULE ORAL EVERY 6 HOURS
Qty: 360 CAPSULE | Refills: 0 | Status: SHIPPED | OUTPATIENT
Start: 2025-02-19 | End: 2025-05-20

## 2025-02-19 RX ORDER — ONDANSETRON 4 MG/1
4 TABLET, ORALLY DISINTEGRATING ORAL EVERY 6 HOURS SCHEDULED
Qty: 360 TABLET | Refills: 0 | Status: SHIPPED | OUTPATIENT
Start: 2025-02-19 | End: 2025-03-06 | Stop reason: SDUPTHER

## 2025-02-19 RX ORDER — SCOPOLAMINE 1 MG/3D
1 PATCH, EXTENDED RELEASE TRANSDERMAL
Qty: 10 PATCH | Refills: 0 | Status: SHIPPED | OUTPATIENT
Start: 2025-02-20

## 2025-02-19 RX ORDER — POLYETHYLENE GLYCOL 3350 17 G/17G
17 POWDER, FOR SOLUTION ORAL DAILY
Qty: 90 PACKET | Refills: 0 | Status: SHIPPED | OUTPATIENT
Start: 2025-02-19 | End: 2025-05-20

## 2025-02-19 RX ORDER — LIDOCAINE 560 MG/1
1 PATCH PERCUTANEOUS; TOPICAL; TRANSDERMAL DAILY
Qty: 10 PATCH | Refills: 0 | Status: SHIPPED | OUTPATIENT
Start: 2025-02-19

## 2025-02-19 RX ORDER — FAMOTIDINE 20 MG/1
20 TABLET, FILM COATED ORAL 2 TIMES DAILY
Qty: 180 TABLET | Refills: 0 | Status: SHIPPED | OUTPATIENT
Start: 2025-02-19 | End: 2025-03-06 | Stop reason: SDUPTHER

## 2025-02-19 RX ORDER — PROCHLORPERAZINE MALEATE 10 MG
10 TABLET ORAL EVERY 6 HOURS PRN
Qty: 120 TABLET | Refills: 2 | Status: SHIPPED | OUTPATIENT
Start: 2025-02-19 | End: 2025-05-20

## 2025-02-19 RX ORDER — METOCLOPRAMIDE 10 MG/1
10 TABLET ORAL EVERY 6 HOURS SCHEDULED
Qty: 120 TABLET | Refills: 2 | Status: SHIPPED | OUTPATIENT
Start: 2025-02-19 | End: 2025-03-06 | Stop reason: SDUPTHER

## 2025-02-19 RX ORDER — PROMETHAZINE HYDROCHLORIDE 25 MG/1
25 SUPPOSITORY RECTAL EVERY 8 HOURS
Qty: 4 SUPPOSITORY | Refills: 1 | Status: SHIPPED | OUTPATIENT
Start: 2025-02-19 | End: 2025-03-06 | Stop reason: ALTCHOICE

## 2025-02-19 RX ADMIN — ONDANSETRON HYDROCHLORIDE 4 MG: 4 TABLET, FILM COATED ORAL at 10:11

## 2025-02-19 RX ADMIN — ONDANSETRON HYDROCHLORIDE 4 MG: 4 TABLET, FILM COATED ORAL at 04:30

## 2025-02-19 RX ADMIN — THIAMINE HCL TAB 100 MG 100 MG: 100 TAB at 08:38

## 2025-02-19 RX ADMIN — DIPHENHYDRAMINE HYDROCHLORIDE 25 MG: 25 CAPSULE ORAL at 10:11

## 2025-02-19 RX ADMIN — FAMOTIDINE 20 MG: 20 TABLET, FILM COATED ORAL at 08:33

## 2025-02-19 RX ADMIN — METOCLOPRAMIDE 10 MG: 10 TABLET ORAL at 02:18

## 2025-02-19 RX ADMIN — METOCLOPRAMIDE 10 MG: 10 TABLET ORAL at 08:33

## 2025-02-19 RX ADMIN — DIPHENHYDRAMINE HYDROCHLORIDE 25 MG: 25 CAPSULE ORAL at 04:30

## 2025-02-19 RX ADMIN — PRENATAL VIT W/ FE FUMARATE-FA TAB 27-0.8 MG 1 TABLET: 27-0.8 TAB at 08:33

## 2025-02-19 RX ADMIN — NICOTINE 1 PATCH: 21 PATCH, EXTENDED RELEASE TRANSDERMAL at 08:33

## 2025-02-19 ASSESSMENT — PAIN - FUNCTIONAL ASSESSMENT
PAIN_FUNCTIONAL_ASSESSMENT: 0-10

## 2025-02-19 ASSESSMENT — LIFESTYLE VARIABLES
TOTAL_SCORE: 1
TOTAL_SCORE: 0
TOTAL_SCORE: 0

## 2025-02-19 ASSESSMENT — PAIN SCALES - GENERAL
PAINLEVEL_OUTOF10: 0 - NO PAIN

## 2025-02-19 NOTE — DISCHARGE SUMMARY
Discharge Diagnosis  Nausea and vomiting in pregnancy    Issues Requiring Follow-Up  None    Test Results Pending At Discharge  Pending Labs       Order Current Status    Basic metabolic panel In process    Magnesium In process            Hospital Course  Lizet Storm is a 29 y.o.  at 5w5d by LMP with Kratom withdrawal and nausea/vomiting of pregnancy.     On , she was admitted with intractable nausea and vomiting and 10lb weight loss in last 12 days. Of note, she also was using Kratom throughout December and January. She was at a detox facility - where she was started on buprenorphine and then titrated off prior to discharge. She recently had a relapse where she used Kratom on , otherwise has not used since. Given the proximity to recent use, there was concern for Kratom withdrawal symptoms and she was initially given subutex on arrival. Given relative proximity to last use, low COWS score, and discussion with the patient, she wished to avoid further subutex use during the admission. Medical toxicology was consulted and provided excellent recommendations.   The patient was initiated on hyperemesis protocol with scheduled IV medications and felt much improved. She was transitioned to PO medications and tolerated PO intake prior to discharge. She expressed interest in establishing with the [facility] clinic, OB psychiatry, and Moms Quit for 2, and the appropriate referrals were placed.    Pertinent Physical Exam At Time of Discharge  General: no acute distress  HEENT: normocephalic, atraumatic  Heart: warm and well perfused  Lungs: breathing comfortably on room air  Extremities: moving all extremities spontaneously  Neuro: awake and conversant  Psych: appropriate mood and affect      Home Medications     Medication List      START taking these medications     capsicum 0.075 % topical cream; Commonly known as: Zostrix; Apply   topically 2 times a day.   diphenhydrAMINE 25 mg capsule; Commonly known  as: BENADryl; Take 1   capsule (25 mg) by mouth every 6 hours.   famotidine 20 mg tablet; Commonly known as: Pepcid; Take 1 tablet (20   mg) by mouth 2 times a day.   lidocaine 4 % patch; Place 1 patch over 12 hours on the skin once daily.   Remove & discard patch within 12 hours or as directed by MD.   metoclopramide 10 mg tablet; Commonly known as: Reglan; Take 1 tablet   (10 mg) by mouth every 6 hours.   nicotine 21 mg/24 hr patch; Commonly known as: Nicoderm CQ; Place 1   patch over 24 hours on the skin once daily.   polyethylene glycol 17 gram packet; Commonly known as: Glycolax,   Miralax; Take 17 g by mouth once daily.   prochlorperazine 10 mg tablet; Commonly known as: Compazine; Take 1   tablet (10 mg) by mouth every 6 hours if needed for nausea or vomiting.   promethazine 25 mg suppository; Commonly known as: Phenergan; Insert 1   suppository (25 mg) into the rectum every 8 hours.   scopolamine 1 mg over 3 days patch 3 day; Commonly known as:   Transderm-Scop; Place 1 patch over 72 hours on the skin every 3rd day.;   Start taking on: February 20, 2025     CHANGE how you take these medications     ondansetron ODT 4 mg disintegrating tablet; Commonly known as:   Zofran-ODT; Dissolve 1 tablet (4 mg) in the mouth every 6 hours.; What   changed: when to take this, reasons to take this     CONTINUE taking these medications     albuterol 90 mcg/actuation inhaler; Commonly known as: Ventolin HFA;   Inhale 2 puffs every 4 hours if needed for wheezing or shortness of   breath.   citalopram 40 mg tablet; Commonly known as: CeleXA   prenatal 93-iron-folate 9-dha 31 mg iron- 1 mg-200 mg capsule; Take 1   capsule by mouth once daily.       Outpatient Follow-Up  Future Appointments   Date Time Provider Department [place]   2/21/2025  9:30 AM [facility]   2/25/2025  7:45 AM MERLINE Durand-FRANDY [id number] [facility]   4/8/2025 10:15 AM Arpita Serrano MD PhD [facility]     D/w   E[place]se.    Mary Pineda MD PGY-2

## 2025-02-19 NOTE — TELEPHONE ENCOUNTER
RADHA/CHRISTINA called patient after receiving a referral from Mary Pineda MD. SW left a message and requested a call back.     SW received a call back from patient, introduced self, explained role, provided information about [facility] and addressed questions. RADHA also scheduled patient an appointment in [facility] and scheduled a time/date to complete intake.

## 2025-02-19 NOTE — CARE PLAN
The patient's goals for the shift include go home    The clinical goals for the shift include no increase in N/V  Patient remained free from falls/injury throughout shift. VSS, no episodes of emesis through shift. Patient still having intermittent nausea, well controlled with PO medication regimen. No OB complaints at time of DC. AVS and urgent maternal warning signs provided and reviewed. Patient verbalized understanding of need to  Rx from [facility] pharmacy. Patient discharged home with SO via private car.

## 2025-02-19 NOTE — PROGRESS NOTES
Lizet Storm is a 29 y.o. female admitted with nausea and vomiting in pregnancy, referred to Social Work for resources regarding quitting smoking.      Met with pt, introduced role of Social Work and reason for referral.  Pt was well appearing and in agreement with interview.      Pt lives in [place] with her significant other who is CHAY.  She was working at a [employer] store but her medical issues caused her to lose her job.  She would like to either return to work or go to school.  CHAY does seasonal work, this is a difficult season for them but he has found some work recently.  They are able to keep up with their rent but have difficulty with paying for food.  She plans to apply for SNAP benefits and WIC.      Pt stated she would like to quit smoking and is interested in the Quit for 2 guided smoking cessation program.      Discussed Food for Life program, pt interested in referral.      Provided information about Food for Life and informed RN pt would like referra.  Also provided information about Quit for Two,  Depression and Anxiety, and list of apps for mental health.      Pt is cleared from Social Work for discharge.      MORGAN Pruett

## 2025-02-19 NOTE — HOSPITAL COURSE
Lizet Storm is a 29 y.o.  at 5w5d by LMP with Kratom withdrawal and nausea/vomiting of pregnancy.     On , she was admitted with intractable nausea and vomiting and 10lb weight loss in last 12 days. Of note, she also was using Kratom throughout December and January. She was at a detox facility - where she was started on buprenorphine and then titrated off prior to discharge. She recently had a relapse where she used Kratom on , otherwise has not used since. Given the proximity to recent use, there was concern for Kratom withdrawal symptoms and she was initially given subutex on arrival. Given relative proximity to last use, low COWS score, and discussion with the patient, she wished to avoid further subutex use during the admission. Medical toxicology was consulted and provided excellent recommendations.   The patient was initiated on hyperemesis protocol with scheduled IV medications and felt much improved. She was transitioned to PO medications and tolerated PO intake prior to discharge. She expressed interest in establishing with the [facility] clinic, OB psychiatry, and Moms Quit for 2, and the appropriate referrals were placed.

## 2025-02-19 NOTE — CARE PLAN
The patient's goals for the shift include eat small meal and take small sips of water    The clinical goals for the shift include for patient to have minimal N/V throughout the shift    Overnight, the patient's VS have remained stable. The patient reported some mild cramping that resolved after taking tylenol. Patient denies spotting/VB, LOF, and N/V. The patient's COWS scores have been between 0-2. The patient is currently stable, resting in room.    Over the shift, the patient did make progress toward the following goals.   Problem: Antepartum  Goal: Maintain pregnancy as long as maternal and/or fetal condition is stable  Outcome: Progressing  Problem: Antepartum  Goal: Minimize anxiety/maximize coping  Outcome: Progressing     Problem: Nausea/Vomiting  Goal: Free from nausea/vomiting  Outcome: Progressing  Problem: Safety - Adult  Goal: Free from fall injury  Outcome: Progressing     Problem: Nausea/Vomiting  Goal: Tolerates prescribed diet  Outcome: Progressing

## 2025-02-20 ENCOUNTER — TELEPHONE (OUTPATIENT)
Dept: OBSTETRICS AND GYNECOLOGY | Facility: CLINIC | Age: 30
End: 2025-02-20
Payer: COMMERCIAL

## 2025-02-20 NOTE — TELEPHONE ENCOUNTER
RADHA/CHRISTINA called patient as scheduled to complete intake. SW reminded patient of scheduled appointment in [facility]oms clinic, addressed questions and encouraged patient to contact RADHA/CHRISTINA with additional questions or needs.

## 2025-02-21 ENCOUNTER — INITIAL PRENATAL (OUTPATIENT)
Dept: OBSTETRICS AND GYNECOLOGY | Facility: CLINIC | Age: 30
End: 2025-02-21
Payer: COMMERCIAL

## 2025-02-21 ENCOUNTER — APPOINTMENT (OUTPATIENT)
Dept: LAB | Facility: HOSPITAL | Age: 30
End: 2025-02-21
Payer: COMMERCIAL

## 2025-02-21 VITALS — SYSTOLIC BLOOD PRESSURE: 106 MMHG | DIASTOLIC BLOOD PRESSURE: 72 MMHG | WEIGHT: 172 LBS | BODY MASS INDEX: 26.93 KG/M2

## 2025-02-21 DIAGNOSIS — Z13.79 ENCOUNTER FOR OTHER SCREENING FOR GENETIC AND CHROMOSOMAL ANOMALIES: ICD-10-CM

## 2025-02-21 DIAGNOSIS — Z72.0 TOBACCO USE: ICD-10-CM

## 2025-02-21 DIAGNOSIS — O36.80X0 PREGNANCY WITH UNCERTAIN FETAL VIABILITY, SINGLE OR UNSPECIFIED FETUS (HHS-HCC): Primary | ICD-10-CM

## 2025-02-21 DIAGNOSIS — F19.10: ICD-10-CM

## 2025-02-21 DIAGNOSIS — Q51.810 ARCUATE UTERUS: ICD-10-CM

## 2025-02-21 DIAGNOSIS — Z98.890 HISTORY OF LOOP ELECTROSURGICAL EXCISION PROCEDURE (LEEP) OF CERVIX: ICD-10-CM

## 2025-02-21 DIAGNOSIS — O21.9 NAUSEA AND VOMITING IN PREGNANCY: ICD-10-CM

## 2025-02-21 DIAGNOSIS — F12.90 MARIJUANA USE: ICD-10-CM

## 2025-02-21 DIAGNOSIS — F19.939: ICD-10-CM

## 2025-02-21 DIAGNOSIS — F39 MOOD DISORDER (CMS-HCC): ICD-10-CM

## 2025-02-21 DIAGNOSIS — O99.320: ICD-10-CM

## 2025-02-21 LAB
POC APPEARANCE, URINE: CLEAR
POC BILIRUBIN, URINE: NEGATIVE
POC BLOOD, URINE: NEGATIVE
POC COLOR, URINE: YELLOW
POC GLUCOSE, URINE: NEGATIVE MG/DL
POC KETONES, URINE: NEGATIVE MG/DL
POC LEUKOCYTES, URINE: NEGATIVE
POC NITRITE,URINE: NEGATIVE
POC PH, URINE: 5.5 PH
POC PROTEIN, URINE: NEGATIVE MG/DL
POC SPECIFIC GRAVITY, URINE: >=1.03
POC UROBILINOGEN, URINE: 0.2 EU/DL

## 2025-02-21 PROCEDURE — 99214 OFFICE O/P EST MOD 30 MIN: CPT | Mod: 25 | Performed by: OBSTETRICS & GYNECOLOGY

## 2025-02-21 PROCEDURE — 76817 TRANSVAGINAL US OBSTETRIC: CPT | Mod: 51 | Performed by: OBSTETRICS & GYNECOLOGY

## 2025-02-21 PROCEDURE — 76817 TRANSVAGINAL US OBSTETRIC: CPT | Performed by: OBSTETRICS & GYNECOLOGY

## 2025-02-21 PROCEDURE — 99214 OFFICE O/P EST MOD 30 MIN: CPT | Performed by: OBSTETRICS & GYNECOLOGY

## 2025-02-21 PROCEDURE — 81003 URINALYSIS AUTO W/O SCOPE: CPT | Performed by: OBSTETRICS & GYNECOLOGY

## 2025-02-21 ASSESSMENT — PAIN - FUNCTIONAL ASSESSMENT: PAIN_FUNCTIONAL_ASSESSMENT: 0-10

## 2025-02-21 ASSESSMENT — PAIN SCALES - GENERAL: PAINLEVEL_OUTOF10: 0 - NO PAIN

## 2025-02-21 ASSESSMENT — ENCOUNTER SYMPTOMS
LOSS OF SENSATION IN FEET: 0
OCCASIONAL FEELINGS OF UNSTEADINESS: 0
DEPRESSION: 0

## 2025-02-21 NOTE — PROGRESS NOTES
"Subjective   Patient ID [id number]   Lizet Storm is a 29 y.o.  at 6w0d with a working estimated date of delivery of 10/17/2025, by Last Menstrual Period who presents for a routine prenatal visit. She denies vaginal bleeding, leakage of fluid, decreased fetal movements, and contractions.    Her pregnancy is complicated by:  ***    Objective   Physical Exam  Weight: 78 kg (172 lb), Pregravid BMI: 28.19  Expected Total Weight Gain: 7 kg (15 lb)-11.5 kg (25 lb)   BP: 106/72         Prenatal Labs  Urine dip:  Lab Results   Component Value Date    KETONESU NEGATIVE 2025     Lab Results   Component Value Date    HGB 12.0 2025    HCT 36.6 2025    ABO O 2025    HEPBSAG Nonreactive 2025     No results found for: \"PAPPA\", \"AFP\", \"HCG\", \"ESTRIOL\", \"INHBA\"    Imaging  The most recent ultrasound was performed on   with a study GA of  .          Assessment/Plan   {Assess/Plan SmartLinks (Optional):98805}    Continue prenatal vitamin.  Labs reviewed.  Order placed for anatomy scan at 20 weeks.  ***  Follow up in 4 weeks for a routine prenatal visit.  " single or unspecified fetus ([facility])  -     POCT UA Automated manually resulted  -     [facility] HCG, TOTAL, QN; Future  Arcuate uterus  -     [facility] Foresight Carrier Screen; Future  Encounter for other screening for genetic and chromosomal anomalies  Marijuana use  Tobacco use  Substance withdrawal with complication (Multi)  History of loop electrosurgical excision procedure (LEEP) of cervix  Nausea and vomiting in pregnancy  High risk pregnancy due to maternal drug abuse, antepartum (Multi)  -     [facility] HCG, TOTAL, QN; Future  Encouraged the patient. She is worried about her baby's outcome. She is in Suboxone program.  Ordered Foresight.   Continue prenatal vitamin with iron.  NV controlled on current regimen.  Labs reviewed.  She [facility] - Dr. Lambert. Possible transfer to [facility]  Patient has a psychiatrist - [facility]  Order placed for NT and anatomy scan at 20 weeks.  Follow up in 4 weeks for a routine prenatal visit.  Safety in pregnancy reviewed including but not exclusive to: vaccination, travel, foods, proper hydration, exercise, and weightbearing.  Reviewed CBR option.  Reviewed positive/negative candidacy for Aspirin prophylaxis  beginning as early as 12 weeks GA secondary to detected risks for preeclampsia.  Will await for her gastritis to calm down., then initiate aspirin due to risk for ulcer surrounding recent complications.      Patient ID: Lizet Storm is a 29 y.o. female.    Procedures  Transvaginal ultrasound is the technique used in the study for the purposes of confirming pregnancy and estimating gestational age.  A single intrauterine pregnancy is present with a fetal pole, a yolk sac, and possibly a cardiac flicker, not definite. The adnexal areas are unremarkable. Gestation sac is small at 10.9 mm.  Gestational age by average ultrasound measurement of the crown rump length (3.1 mm) is 5 weeks and 6 days.  This is consistent with dating by  last menstrual period (LMP) of 5 weeks and 6 days. The final estimated date of delivery based on the  LMP is 10/17/2025.  Repeat ultrasound to follow interval development in 1-2 weeks.   This ultrasound is indicated for pregnancy confirmation. Due to the limited scan, fetal or maternal anomalies have not been ruled out.

## 2025-02-22 LAB — B-HCG SERPL-ACNC: 5572 MIU/ML

## 2025-02-25 ENCOUNTER — SOCIAL WORK (OUTPATIENT)
Dept: PEDIATRICS | Facility: CLINIC | Age: 30
End: 2025-02-25

## 2025-02-25 ENCOUNTER — OFFICE VISIT (OUTPATIENT)
Dept: OBSTETRICS AND GYNECOLOGY | Facility: CLINIC | Age: 30
End: 2025-02-25
Payer: COMMERCIAL

## 2025-02-25 ENCOUNTER — TELEPHONE (OUTPATIENT)
Dept: OBSTETRICS AND GYNECOLOGY | Facility: CLINIC | Age: 30
End: 2025-02-25
Payer: COMMERCIAL

## 2025-02-25 ENCOUNTER — APPOINTMENT (OUTPATIENT)
Dept: ENDOCRINOLOGY | Facility: CLINIC | Age: 30
End: 2025-02-25
Payer: COMMERCIAL

## 2025-02-25 VITALS
BODY MASS INDEX: 28.86 KG/M2 | HEART RATE: 98 BPM | SYSTOLIC BLOOD PRESSURE: 147 MMHG | DIASTOLIC BLOOD PRESSURE: 88 MMHG | WEIGHT: 184.3 LBS

## 2025-02-25 DIAGNOSIS — F41.9 ANXIETY AND DEPRESSION: ICD-10-CM

## 2025-02-25 DIAGNOSIS — R03.0 ELEVATED BLOOD PRESSURE READING IN OFFICE WITHOUT DIAGNOSIS OF HYPERTENSION: ICD-10-CM

## 2025-02-25 DIAGNOSIS — F90.2 ATTENTION DEFICIT HYPERACTIVITY DISORDER (ADHD), COMBINED TYPE: ICD-10-CM

## 2025-02-25 DIAGNOSIS — F19.10 POLYSUBSTANCE ABUSE (MULTI): ICD-10-CM

## 2025-02-25 DIAGNOSIS — F10.11 ALCOHOL ABUSE, IN REMISSION: ICD-10-CM

## 2025-02-25 DIAGNOSIS — F11.90 OPIOID USE DISORDER: Primary | ICD-10-CM

## 2025-02-25 DIAGNOSIS — O21.9 NAUSEA AND VOMITING IN PREGNANCY: ICD-10-CM

## 2025-02-25 DIAGNOSIS — F32.A ANXIETY AND DEPRESSION: ICD-10-CM

## 2025-02-25 PROCEDURE — 99215 OFFICE O/P EST HI 40 MIN: CPT | Mod: TH | Performed by: OBSTETRICS & GYNECOLOGY

## 2025-02-25 RX ORDER — NALOXONE HYDROCHLORIDE 4 MG/.1ML
1 SPRAY NASAL AS NEEDED
Qty: 2 EACH | Refills: 0 | Status: SHIPPED | OUTPATIENT
Start: 2025-02-25

## 2025-02-25 RX ORDER — BUPRENORPHINE HYDROCHLORIDE AND NALOXONE HYDROCHLORIDE DIHYDRATE 2; .5 MG/1; MG/1
1 TABLET SUBLINGUAL 4 TIMES DAILY
Qty: 56 TABLET | Refills: 0 | Status: SHIPPED | OUTPATIENT
Start: 2025-02-25 | End: 2025-03-11

## 2025-02-25 ASSESSMENT — PAIN SCALES - GENERAL: PAINLEVEL_OUTOF10: 0 - NO PAIN

## 2025-02-25 ASSESSMENT — ENCOUNTER SYMPTOMS
ENDOCRINE NEGATIVE: 0
MUSCULOSKELETAL NEGATIVE: 0
PSYCHIATRIC NEGATIVE: 0
NEUROLOGICAL NEGATIVE: 0
GASTROINTESTINAL NEGATIVE: 0
CARDIOVASCULAR NEGATIVE: 0
CONSTITUTIONAL NEGATIVE: 0
HEMATOLOGIC/LYMPHATIC NEGATIVE: 0
EYES NEGATIVE: 0
RESPIRATORY NEGATIVE: 0
ALLERGIC/IMMUNOLOGIC NEGATIVE: 0

## 2025-02-25 ASSESSMENT — PATIENT HEALTH QUESTIONNAIRE - PHQ9
2. FEELING DOWN, DEPRESSED OR HOPELESS: NOT AT ALL
1. LITTLE INTEREST OR PLEASURE IN DOING THINGS: NOT AT ALL
SUM OF ALL RESPONSES TO PHQ9 QUESTIONS 1 AND 2: 0

## 2025-02-25 ASSESSMENT — PAIN - FUNCTIONAL ASSESSMENT: PAIN_FUNCTIONAL_ASSESSMENT: 0-10

## 2025-02-25 NOTE — TELEPHONE ENCOUNTER
Per [facility] note Pt stated to [facility] that she has had no nausea since leaving the hospital and wants to wait on to see if it comes back.  Case is now closed.
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
## 2025-02-25 NOTE — PROGRESS NOTES
RADHA/CHRISTINA met with patient during appointment in [facility] to assess needs, FOB was also present. SW provided patient with a welcome folder and discussed information/resources contained in the folder. Patient was also given a $25 visa gift card to assist with essential needs. Patient completed clinical scales, SW scheduled patient for next appointment and addressed questions. SW inquired if patient was able to connect with [facility] to reengage in mental health service. Patient stated that she attempted to call and plan to call again tomorrow. SW advised that patient contact RADHA if she's having issues connecting with service provider and if she needs assistance with referrals. SW encouraged patient to contact RADHA/CHRISTINA if she have additional questions or needs. SW also encouraged patient to stop at [facility] to meet with staff and provide the staff with completed social needs assessment.
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
## 2025-02-26 LAB
COMMENTS - MP RESULT TYPE: NORMAL
SCAN RESULT: NORMAL

## 2025-02-27 NOTE — DOCUMENTATION CLARIFICATION NOTE
"    PATIENT:               BECCA MARTE  ACCT #:                  [id number]  MRN:                       [id number]  :                       1995  ADMIT DATE:       2025 7:16 PM  DISCH DATE:        2025 12:37 PM  RESPONDING PROVIDER #:        [id number]          PROVIDER RESPONSE TEXT:    Hyperemesis gravidarum, cannabis hyperemesis syndrome, kratom with withdrawal    CDI QUERY TEXT:    Clarification    Instruction:    Based on your assessment of the patient and the clinical information, please provide the requested documentation by clicking on the appropriate radio button and enter any additional information if prompted.    Question: Please further clarify the most likely etiology of nausea and vomiting  on admission after final work up    When answering this query, please exercise your independent professional judgment. The fact that a question is being asked, does not imply that any particular answer is desired or expected.    The patient's clinical indicators include:  Clinical Information: 29 yr old who presented with nausea and vomiting  in pregnancy (5 w3d)    Clinical Indicators:  H&P notes \" intractable nausea and vomiting and has had 10lb weight loss in last 12 days\"   med toxicology consult;  \"Cause of her nausea and vomiting is unclear, but most likely multifactorial.  She is early in pregnancy, has recently stopped marijuana use, had recently been quickly down tritrated  from buprenorphine, and recently used kratom.  Typically, we do not recommend quick de-escalation of buprenorphine due to the risk of withdrawal, so I am concerned that the nausea and vomiting that she had a few days after leaving the detox center was continued opioid withdrawal, especially given buprenorphine's long half-life.    That said, the component of her presentation that can be attributed to kratom withdrawal is likely low, given that her symptoms completely resolved with a very low dose of buprenorphine, " "only 2 mg.    reasonable to focus on symptomatic treatment for what may be the remainder of her kratom withdrawal versus nausea and vomiting with pregnancy versus cannabis hyperemesis syndrome\".      Treatment:  hyperemesis protocol with scheduled IV medications  toxicology consult    Risk Factors:  recent marijuana use  quickly down tritrated  from buprenorphine  recently used kratom- daily x2 mo- detox facility 1/28-2/1 with last reported use 2/14  Options provided:  -- hyperemesis gravidarum  -- cannabis hyperemesis syndrome  -- kratom dependence with withdrawl  -- Other - I will add my own diagnosis  -- Refer to Clinical Documentation Reviewer    Query created by: Nancy Clayton on 2/27/2025 1:37 PM      Electronically signed by:  CHANG LOCKWOOD MD 2/27/2025 4:43 PM          "

## 2025-02-28 LAB
AMPHETAMINES UR QL: NEGATIVE NG/ML
BARBITURATES UR QL: NEGATIVE NG/ML
BENZODIAZ UR QL: NEGATIVE NG/ML
BUPRENORPHINE UR-MCNC: NEGATIVE NG/ML
BZE UR QL: NEGATIVE NG/ML
CREAT UR-MCNC: 43.6 MG/DL
DRUG SCREEN COMMENT UR-IMP: ABNORMAL
DRUG SCREEN COMMENT UR-IMP: NORMAL
FENTANYL UR QL SCN: NEGATIVE NG/ML
METHADONE UR QL: NEGATIVE NG/ML
NORBUPRENORPHINE UR-MCNC: ABNORMAL NG/ML
NORTRAMADOL UR-MCNC: NEGATIVE NG/ML
OPIATES UR QL: NEGATIVE NG/ML
OXIDANTS UR QL: NEGATIVE MCG/ML
OXYCODONE UR QL: NEGATIVE NG/ML
PCP UR QL: NEGATIVE NG/ML
PH UR: 5.8 [PH] (ref 4.5–9)
QUEST 6 ACETYLMORPHINE: NEGATIVE NG/ML
QUEST NALOXONE, URINE: NEGATIVE NG/ML
QUEST NOTES AND COMMENTS: ABNORMAL
QUEST ZOLPIDEM: NEGATIVE NG/ML
THC UR QL: POSITIVE NG/ML
THC UR-MCNC: 806 NG/ML
TRAMADOL UR-MCNC: NEGATIVE NG/ML
ZOLPIDEM PHENYL-4-CARB UR CFM-MCNC: NEGATIVE NG/ML

## 2025-03-02 NOTE — PATIENT INSTRUCTIONS
- May repeat US in 1-2 weeks to follow interval development     - Begin aspirin once pregnancy viability is confirmed.     -Continue rehab programs -  vs [facility]    -

## 2025-03-04 ENCOUNTER — APPOINTMENT (OUTPATIENT)
Dept: OBSTETRICS AND GYNECOLOGY | Facility: CLINIC | Age: 30
End: 2025-03-04
Payer: COMMERCIAL

## 2025-03-05 ENCOUNTER — HOSPITAL ENCOUNTER (EMERGENCY)
Facility: HOSPITAL | Age: 30
Discharge: HOME | End: 2025-03-05
Attending: EMERGENCY MEDICINE
Payer: COMMERCIAL

## 2025-03-05 VITALS
HEART RATE: 101 BPM | DIASTOLIC BLOOD PRESSURE: 106 MMHG | TEMPERATURE: 99 F | RESPIRATION RATE: 18 BRPM | BODY MASS INDEX: 28.25 KG/M2 | WEIGHT: 180 LBS | SYSTOLIC BLOOD PRESSURE: 142 MMHG | HEIGHT: 67 IN | OXYGEN SATURATION: 100 %

## 2025-03-05 DIAGNOSIS — R11.2 NAUSEA AND VOMITING, UNSPECIFIED VOMITING TYPE: ICD-10-CM

## 2025-03-05 DIAGNOSIS — E86.0 DEHYDRATION: Primary | ICD-10-CM

## 2025-03-05 LAB
ALBUMIN SERPL BCP-MCNC: 3.9 G/DL (ref 3.4–5)
ALP SERPL-CCNC: 41 U/L (ref 33–110)
ALT SERPL W P-5'-P-CCNC: 38 U/L (ref 7–45)
AMPHETAMINES UR QL SCN: ABNORMAL
ANION GAP SERPL CALCULATED.3IONS-SCNC: 14 MMOL/L (ref 10–20)
APPEARANCE UR: ABNORMAL
AST SERPL W P-5'-P-CCNC: 27 U/L (ref 9–39)
B-HCG SERPL-ACNC: ABNORMAL MIU/ML
BARBITURATES UR QL SCN: ABNORMAL
BASOPHILS # BLD AUTO: 0.03 X10*3/UL (ref 0–0.1)
BASOPHILS NFR BLD AUTO: 0.4 %
BENZODIAZ UR QL SCN: ABNORMAL
BILIRUB SERPL-MCNC: 0.5 MG/DL (ref 0–1.2)
BILIRUB UR STRIP.AUTO-MCNC: NEGATIVE MG/DL
BUN SERPL-MCNC: 6 MG/DL (ref 6–23)
BZE UR QL SCN: ABNORMAL
CALCIUM SERPL-MCNC: 8.7 MG/DL (ref 8.6–10.3)
CANNABINOIDS UR QL SCN: ABNORMAL
CHLORIDE SERPL-SCNC: 103 MMOL/L (ref 98–107)
CO2 SERPL-SCNC: 17 MMOL/L (ref 21–32)
COLOR UR: ABNORMAL
CREAT SERPL-MCNC: 0.47 MG/DL (ref 0.5–1.05)
EGFRCR SERPLBLD CKD-EPI 2021: >90 ML/MIN/1.73M*2
EOSINOPHIL # BLD AUTO: 0.01 X10*3/UL (ref 0–0.7)
EOSINOPHIL NFR BLD AUTO: 0.1 %
ERYTHROCYTE [DISTWIDTH] IN BLOOD BY AUTOMATED COUNT: 12.5 % (ref 11.5–14.5)
FENTANYL+NORFENTANYL UR QL SCN: ABNORMAL
GLUCOSE SERPL-MCNC: 116 MG/DL (ref 74–99)
GLUCOSE UR STRIP.AUTO-MCNC: NORMAL MG/DL
HCT VFR BLD AUTO: 40.6 % (ref 36–46)
HGB BLD-MCNC: 13.8 G/DL (ref 12–16)
HOLD SPECIMEN: NORMAL
IMM GRANULOCYTES # BLD AUTO: 0.02 X10*3/UL (ref 0–0.7)
IMM GRANULOCYTES NFR BLD AUTO: 0.3 % (ref 0–0.9)
KETONES UR STRIP.AUTO-MCNC: ABNORMAL MG/DL
LEUKOCYTE ESTERASE UR QL STRIP.AUTO: NEGATIVE
LIPASE SERPL-CCNC: 4 U/L (ref 9–82)
LYMPHOCYTES # BLD AUTO: 0.94 X10*3/UL (ref 1.2–4.8)
LYMPHOCYTES NFR BLD AUTO: 13.3 %
MCH RBC QN AUTO: 30.4 PG (ref 26–34)
MCHC RBC AUTO-ENTMCNC: 34 G/DL (ref 32–36)
MCV RBC AUTO: 89 FL (ref 80–100)
METHADONE UR QL SCN: ABNORMAL
MONOCYTES # BLD AUTO: 0.25 X10*3/UL (ref 0.1–1)
MONOCYTES NFR BLD AUTO: 3.5 %
NEUTROPHILS # BLD AUTO: 5.84 X10*3/UL (ref 1.2–7.7)
NEUTROPHILS NFR BLD AUTO: 82.4 %
NITRITE UR QL STRIP.AUTO: NEGATIVE
NRBC BLD-RTO: 0 /100 WBCS (ref 0–0)
OPIATES UR QL SCN: ABNORMAL
OXYCODONE+OXYMORPHONE UR QL SCN: ABNORMAL
PCP UR QL SCN: ABNORMAL
PH UR STRIP.AUTO: 7.5 [PH]
PLATELET # BLD AUTO: 239 X10*3/UL (ref 150–450)
POTASSIUM SERPL-SCNC: 4.1 MMOL/L (ref 3.5–5.3)
PROT SERPL-MCNC: 6.6 G/DL (ref 6.4–8.2)
PROT UR STRIP.AUTO-MCNC: NEGATIVE MG/DL
RBC # BLD AUTO: 4.54 X10*6/UL (ref 4–5.2)
RBC # UR STRIP.AUTO: NEGATIVE MG/DL
SODIUM SERPL-SCNC: 130 MMOL/L (ref 136–145)
SP GR UR STRIP.AUTO: 1.01
UROBILINOGEN UR STRIP.AUTO-MCNC: NORMAL MG/DL
WBC # BLD AUTO: 7.1 X10*3/UL (ref 4.4–11.3)

## 2025-03-05 PROCEDURE — 83690 ASSAY OF LIPASE: CPT | Performed by: PHYSICIAN ASSISTANT

## 2025-03-05 PROCEDURE — 96375 TX/PRO/DX INJ NEW DRUG ADDON: CPT

## 2025-03-05 PROCEDURE — 84702 CHORIONIC GONADOTROPIN TEST: CPT | Performed by: PHYSICIAN ASSISTANT

## 2025-03-05 PROCEDURE — 99284 EMERGENCY DEPT VISIT MOD MDM: CPT | Mod: 25 | Performed by: EMERGENCY MEDICINE

## 2025-03-05 PROCEDURE — 84439 ASSAY OF FREE THYROXINE: CPT | Mod: WESLAB | Performed by: OBSTETRICS & GYNECOLOGY

## 2025-03-05 PROCEDURE — 84443 ASSAY THYROID STIM HORMONE: CPT | Performed by: OBSTETRICS & GYNECOLOGY

## 2025-03-05 PROCEDURE — 80053 COMPREHEN METABOLIC PANEL: CPT | Performed by: PHYSICIAN ASSISTANT

## 2025-03-05 PROCEDURE — 87389 HIV-1 AG W/HIV-1&-2 AB AG IA: CPT | Mod: WESLAB | Performed by: OBSTETRICS & GYNECOLOGY

## 2025-03-05 PROCEDURE — 85025 COMPLETE CBC W/AUTO DIFF WBC: CPT | Performed by: PHYSICIAN ASSISTANT

## 2025-03-05 PROCEDURE — 2500000004 HC RX 250 GENERAL PHARMACY W/ HCPCS (ALT 636 FOR OP/ED): Performed by: PHYSICIAN ASSISTANT

## 2025-03-05 PROCEDURE — 81003 URINALYSIS AUTO W/O SCOPE: CPT | Mod: 59 | Performed by: PHYSICIAN ASSISTANT

## 2025-03-05 PROCEDURE — 83036 HEMOGLOBIN GLYCOSYLATED A1C: CPT | Mod: WESLAB | Performed by: OBSTETRICS & GYNECOLOGY

## 2025-03-05 PROCEDURE — 96361 HYDRATE IV INFUSION ADD-ON: CPT

## 2025-03-05 PROCEDURE — 96374 THER/PROPH/DIAG INJ IV PUSH: CPT

## 2025-03-05 PROCEDURE — 80307 DRUG TEST PRSMV CHEM ANLYZR: CPT | Performed by: PHYSICIAN ASSISTANT

## 2025-03-05 PROCEDURE — 36415 COLL VENOUS BLD VENIPUNCTURE: CPT | Performed by: PHYSICIAN ASSISTANT

## 2025-03-05 RX ORDER — DIPHENHYDRAMINE HYDROCHLORIDE 50 MG/ML
25 INJECTION INTRAMUSCULAR; INTRAVENOUS ONCE
Status: COMPLETED | OUTPATIENT
Start: 2025-03-05 | End: 2025-03-05

## 2025-03-05 RX ORDER — METOCLOPRAMIDE HYDROCHLORIDE 5 MG/ML
10 INJECTION INTRAMUSCULAR; INTRAVENOUS ONCE
Status: COMPLETED | OUTPATIENT
Start: 2025-03-05 | End: 2025-03-05

## 2025-03-05 RX ADMIN — SODIUM CHLORIDE 1000 ML: 900 INJECTION, SOLUTION INTRAVENOUS at 09:40

## 2025-03-05 RX ADMIN — DIPHENHYDRAMINE HYDROCHLORIDE 25 MG: 50 INJECTION, SOLUTION INTRAMUSCULAR; INTRAVENOUS at 09:52

## 2025-03-05 RX ADMIN — METOCLOPRAMIDE 10 MG: 5 INJECTION, SOLUTION INTRAMUSCULAR; INTRAVENOUS at 09:53

## 2025-03-05 ASSESSMENT — LIFESTYLE VARIABLES
EVER FELT BAD OR GUILTY ABOUT YOUR DRINKING: NO
EVER HAD A DRINK FIRST THING IN THE MORNING TO STEADY YOUR NERVES TO GET RID OF A HANGOVER: NO
HAVE PEOPLE ANNOYED YOU BY CRITICIZING YOUR DRINKING: NO
HAVE YOU EVER FELT YOU SHOULD CUT DOWN ON YOUR DRINKING: NO
TOTAL SCORE: 0

## 2025-03-05 ASSESSMENT — PAIN DESCRIPTION - LOCATION: LOCATION: MOUTH

## 2025-03-05 ASSESSMENT — PAIN SCALES - GENERAL: PAINLEVEL_OUTOF10: 7

## 2025-03-05 ASSESSMENT — PAIN DESCRIPTION - PAIN TYPE: TYPE: ACUTE PAIN

## 2025-03-05 ASSESSMENT — PAIN - FUNCTIONAL ASSESSMENT: PAIN_FUNCTIONAL_ASSESSMENT: 0-10

## 2025-03-05 NOTE — ED PROVIDER NOTES
HPI   Chief Complaint   Patient presents with    Dehydration     Pt thinks that she is dehydrated. States when she gets dehydrated she cannot move her mouth and feels as though she has lock jaw. Pt has been nauseous. Pt is 8 weeks pregnant       HPI  This is a 29-year-old female who is approximately 8 weeks pregnant presenting to the emerged part for concerns of dehydration.  Patient has been vomiting and has been nauseous.  She had a confirmatory IUP ultrasound.  She does have history of hyperemesis gravidarum which she required hospitalization from 2/17/2025 to 2/19/2025.  She also has a history of marijuana abuse and currently on Suboxone for opioid use disorder and illicit substance abuse with kratum.  Patient is currently in outpatient rehabilitation program for this substance abuse.  No reported vaginal bleeding.  No reported abdominal pain.    Please see HPI for pertinent positive and negative ROS.       Patient History   Past Medical History:   Diagnosis Date    ADD (attention deficit disorder)     on Adderall ER 15mg QD + Adderall IR 10mg QPM    Cervical dysplasia     history of LEEP in 20s. Neg cotest Feb 2024.    Depression     on Celexa, doxepin, trazodone    Female infertility     Polysubstance use disorder     Cocaine (last use 2023), Alcohol (last use 2023), kratom (last use Jan 2025) THC (last use Feb 2025)    Recovering alcoholic in remission (Multi)      Past Surgical History:   Procedure Laterality Date    CERVICAL BIOPSY  W/ LOOP ELECTRODE EXCISION      COSMETIC SURGERY  2022    BBL & lipo    TONSILLECTOMY       Family History   Problem Relation Name Age of Onset    Accidental death Father Ino     Alcohol abuse Father Ino     Depression Father Ino     Drug abuse Father Ino     Mental illness Father Ino      Social History     Tobacco Use    Smoking status: Every Day     Current packs/day: 0.25     Average packs/day: 0.3 packs/day for 15.0 years (3.8 ttl pk-yrs)     Types:  Cigarettes     Passive exposure: Never    Smokeless tobacco: Never    Tobacco comments:     Patient is a willing participant in Detox programs. Desires a family.    Vaping Use    Vaping status: Every Day    Substances: Nicotine, THC, CBD, Flavoring    Devices: Disposable, Pre-filled or refillable cartridge   Substance Use Topics    Alcohol use: Not Currently     Comment: States used to be a really bad alcoholic    Drug use: Yes     Types: Marijuana     Comment: THC (Heavy), Kratum (heavy-detox at [facility]). Hx of Heroin abuse - recovered.       Physical Exam   ED Triage Vitals [03/05/25 0915]   Temperature Heart Rate Respirations BP   37.2 °C (99 °F) (!) 101 18 (!) 142/106      Pulse Ox Temp Source Heart Rate Source Patient Position   100 % Temporal Monitor Sitting      BP Location FiO2 (%)     Left arm --       Physical Exam  GENERAL APPEARANCE: Awake and alert. No acute respiratory distress.   VITAL SIGNS: As per the nurses' triage record.  HEENT: Normocephalic, atraumatic. Extraocular muscles are intact. Conjunctiva are pink. Negative scleral icterus. Mucous membranes are dry.   NECK: Soft, nontender and supple, full gross ROM, no meningeal signs.  CHEST: Nontender to palpation. Clear to auscultation bilaterally. No rales, rhonchi, or wheezing. Symmetric rise and fall of chest wall.   HEART: Clear S1 and S2.  Regular rate.  Strong and equal pulses in the extremities.  ABDOMEN: Soft, nontender, nondistended, positive bowel sounds, no palpable masses.  MUSCULOSKELETAL: Full gross active range of motion. Ambulating on own with no acute difficulties  NEUROLOGICAL: Awake, alert and oriented x 3. Motor power intact in the upper and lower extremities. Sensation is intact to light touch in the upper and lower extremities. Patient answering questions appropriately.   IMMUNOLOGICAL: No lymphatic streaking noted  DERMATOLOGIC: Warm and dry without petechiae, rashes, or ecchymosis noted on visible skin.   PYSCH: Cooperative with  appropriate mood and affect.    ED Course & MDM   Diagnoses as of 03/06/25 2118   Dehydration   Nausea and vomiting, unspecified vomiting type                 No data recorded     Supai Coma Scale Score: 15 (03/05/25 0946 : Carol Bangura RN)                           Medical Decision Making  Parts of this chart have been completed using voice recognition software. Please excuse any errors of transcription.  My thought process and reason for plan has been formulated from the time that I saw the patient until the time of disposition and is not specific to one specific moment during their visit and furthermore my MDM encompasses this entire chart and not only this text box.      HPI: Detailed above.    Exam: A medically appropriate exam performed, outlined above, given the known history and presentation.    History obtained from: Patient    Medications given during visit:  Medications   sodium chloride 0.9 % bolus 1,000 mL (1,000 mL intravenous New Bag 3/5/25 0990)   metoclopramide (Reglan) injection 10 mg (has no administration in time range)   diphenhydrAMINE (BENADryl) injection 25 mg (has no administration in time range)        Diagnostic/tests  Labs Reviewed   CBC WITH AUTO DIFFERENTIAL - Abnormal       Result Value    WBC 7.1      nRBC 0.0      RBC 4.54      Hemoglobin 13.8      Hematocrit 40.6      MCV 89      MCH 30.4      MCHC 34.0      RDW 12.5      Platelets 239      Neutrophils % 82.4      Immature Granulocytes %, Automated 0.3      Lymphocytes % 13.3      Monocytes % 3.5      Eosinophils % 0.1      Basophils % 0.4      Neutrophils Absolute 5.84      Immature Granulocytes Absolute, Automated 0.02      Lymphocytes Absolute 0.94 (*)     Monocytes Absolute 0.25      Eosinophils Absolute 0.01      Basophils Absolute 0.03     COMPREHENSIVE METABOLIC PANEL   LIPASE   HUMAN CHORIONIC GONADOTROPIN, SERUM QUANTITATIVE   URINALYSIS WITH REFLEX CULTURE AND MICROSCOPIC    Narrative:     The following orders were  created for panel order Urinalysis with Reflex Culture and Microscopic.  Procedure                               Abnormality         Status                     ---------                               -----------         ------                     Urinalysis with Reflex C...[[id number]]                                                 Extra Urine Gray Tube[[id number]]                                                         Please view results for these tests on the individual orders.   URINALYSIS WITH REFLEX CULTURE AND MICROSCOPIC   EXTRA URINE GRAY TUBE   DRUG SCREEN,URINE      No orders to display        Considerations/further MDM:  Patient was seen in conjucntion with my supervising physician,  Dr. Burns. Please refer to her note.    Patient presents for hyperemesis gravidarum.  Confirmatory IUP on ultrasound, do not feel repeat ultrasound clinically indicated.  Urinalysis does show 3+ ketones.  Drug screen is positive for cannabinoid.  CMP shows mild hyponatremia that was treated with the IV normal saline.  hCG level 49,448.  CBC shows no leukocytosis or anemia.  Patient was given 1 L IV normal saline, IV Reglan and IV Benadryl with improvement in her symptoms.  She was discharged with instructions to continue Zofran that was prescribed to her and follow-up with OB/GYN.  Return precautions were discussed.  Patient verbalized understanding was discharged in stable condition.      Procedure  Procedures     Lashawn Myers PA-C  03/06/25 1050

## 2025-03-05 NOTE — Clinical Note
Lizet Storm was seen and treated in our emergency department on 3/5/2025.  She may return to work on 03/06/2025.       If you have any questions or concerns, please don't hesitate to call.      Pooja Burns MD

## 2025-03-05 NOTE — DISCHARGE INSTRUCTIONS
The exact cause of your nausea and vomiting is unclear.    Diet as tolerated.  Zofran as needed at home.    More than 50% of abdominal pain related symptoms that comes to the Emergency Department goes undiagnosed and that there were no emergent findings in your workup today.  If your symptoms get worse, developed high fever, or persistent vomiting you should come back to the Emergency Department. Often times an appendicitis, colitis, diverticulitis, cholelithiasis and many other such illnesses are undetectable early on in their course and will not be seen on the first emergency department visit.  I recommend that you be re-examined in 12-24 hours if your symptoms are not significantly improved.

## 2025-03-06 ENCOUNTER — ROUTINE PRENATAL (OUTPATIENT)
Dept: OBSTETRICS AND GYNECOLOGY | Facility: CLINIC | Age: 30
End: 2025-03-06
Payer: COMMERCIAL

## 2025-03-06 VITALS — WEIGHT: 177 LBS | DIASTOLIC BLOOD PRESSURE: 86 MMHG | BODY MASS INDEX: 27.72 KG/M2 | SYSTOLIC BLOOD PRESSURE: 122 MMHG

## 2025-03-06 DIAGNOSIS — K59.00 CONSTIPATION IN PREGNANCY IN FIRST TRIMESTER (HHS-HCC): ICD-10-CM

## 2025-03-06 DIAGNOSIS — F19.10: ICD-10-CM

## 2025-03-06 DIAGNOSIS — Z11.3 SCREEN FOR STD (SEXUALLY TRANSMITTED DISEASE): ICD-10-CM

## 2025-03-06 DIAGNOSIS — O36.80X0 PREGNANCY WITH UNCERTAIN FETAL VIABILITY, SINGLE OR UNSPECIFIED FETUS (HHS-HCC): Primary | ICD-10-CM

## 2025-03-06 DIAGNOSIS — Z13.79 ENCOUNTER FOR OTHER SCREENING FOR GENETIC AND CHROMOSOMAL ANOMALIES: ICD-10-CM

## 2025-03-06 DIAGNOSIS — O21.9 NAUSEA AND VOMITING IN PREGNANCY: ICD-10-CM

## 2025-03-06 DIAGNOSIS — N83.201 RIGHT OVARIAN CYST: ICD-10-CM

## 2025-03-06 DIAGNOSIS — O99.611 CONSTIPATION IN PREGNANCY IN FIRST TRIMESTER (HHS-HCC): ICD-10-CM

## 2025-03-06 DIAGNOSIS — O99.320: ICD-10-CM

## 2025-03-06 LAB
EST. AVERAGE GLUCOSE BLD GHB EST-MCNC: 108 MG/DL
HBA1C MFR BLD: 5.4 %
HIV 1+2 AB+HIV1 P24 AG SERPL QL IA: NONREACTIVE
POC APPEARANCE, URINE: ABNORMAL
POC BILIRUBIN, URINE: NEGATIVE
POC BLOOD, URINE: NEGATIVE
POC COLOR, URINE: YELLOW
POC GLUCOSE, URINE: NEGATIVE MG/DL
POC KETONES, URINE: ABNORMAL MG/DL
POC LEUKOCYTES, URINE: NEGATIVE
POC NITRITE,URINE: NEGATIVE
POC PH, URINE: 6 PH
POC PROTEIN, URINE: NEGATIVE MG/DL
POC SPECIFIC GRAVITY, URINE: >=1.03
POC UROBILINOGEN, URINE: 0.2 EU/DL
TSH SERPL-ACNC: 0.26 MIU/L (ref 0.44–3.98)

## 2025-03-06 PROCEDURE — 76817 TRANSVAGINAL US OBSTETRIC: CPT | Mod: 51 | Performed by: OBSTETRICS & GYNECOLOGY

## 2025-03-06 PROCEDURE — 99214 OFFICE O/P EST MOD 30 MIN: CPT | Mod: 25,TH | Performed by: OBSTETRICS & GYNECOLOGY

## 2025-03-06 PROCEDURE — 99214 OFFICE O/P EST MOD 30 MIN: CPT | Performed by: OBSTETRICS & GYNECOLOGY

## 2025-03-06 PROCEDURE — 76817 TRANSVAGINAL US OBSTETRIC: CPT | Performed by: OBSTETRICS & GYNECOLOGY

## 2025-03-06 PROCEDURE — 81003 URINALYSIS AUTO W/O SCOPE: CPT | Performed by: OBSTETRICS & GYNECOLOGY

## 2025-03-06 RX ORDER — ADHESIVE BANDAGE
30 BANDAGE TOPICAL DAILY PRN
Qty: 360 ML | Refills: 3 | Status: SHIPPED | OUTPATIENT
Start: 2025-03-06

## 2025-03-06 RX ORDER — ONDANSETRON 4 MG/1
4 TABLET, ORALLY DISINTEGRATING ORAL EVERY 6 HOURS SCHEDULED
Qty: 360 TABLET | Refills: 0 | Status: SHIPPED | OUTPATIENT
Start: 2025-03-06 | End: 2026-03-06

## 2025-03-06 RX ORDER — METOCLOPRAMIDE 10 MG/1
10 TABLET ORAL EVERY 6 HOURS SCHEDULED
Qty: 120 TABLET | Refills: 2 | Status: SHIPPED | OUTPATIENT
Start: 2025-03-06 | End: 2025-05-29

## 2025-03-06 RX ORDER — FAMOTIDINE 20 MG/1
20 TABLET, FILM COATED ORAL 2 TIMES DAILY
Qty: 180 TABLET | Refills: 3 | Status: SHIPPED | OUTPATIENT
Start: 2025-03-06 | End: 2025-06-04

## 2025-03-06 ASSESSMENT — ENCOUNTER SYMPTOMS
DEPRESSION: 0
LOSS OF SENSATION IN FEET: 0
OCCASIONAL FEELINGS OF UNSTEADINESS: 0

## 2025-03-06 NOTE — ED PROVIDER NOTES
The patient was seen in conjunction with the advanced practice provider, and I performed a substantive portion of the visit. I personally saw the patient and made/approved the management plan and take responsibility for the patient management. All medical decision making was performed by me. All laboratory studies, radiology, and EKGs were reviewed by me.     History: 29-year-old female presents with concern for dehydration.  She has been vomiting.  She is approximately 8 weeks pregnant has had confirmatory IUP but also uses marijuana and previously hospitalized 2/17/2025 for similar complaints suspected to be hyperemesis gravidarum at which time she was also noted to be on Suboxone for history of opioid use disorder and admitted to using kratum, an illicit substance.  Physical exam:  General: Vitals reviewed. Awake, alert, well-developed, well-nourished, NAD  HEENT: NC/AT, PERRL, MM tacky  Neck: Supple, trachea midline  Respiratory: No respiratory distress, lungs clear to auscultation bilaterally, no wheezes, rhonchi, or rales  CV: Regular rate and regular rhythm, no murmur/gallop/rubs  Abdomen/GI: Soft, non-tender, non-distended, no rebound, guarding, or rigidity, normal bowel sounds  Extremities: Moving all extremities, no deformities  Neuro: A/O, normal speech  Skin: Warm, dry. No rashes identified  MDM: 29-year-old female presents for nausea and vomiting in early pregnancy history of hyperemesis gravidarum as well as substance use disorder.  Her physical exam is unremarkable aside from mildly tacky mucous membrane suggestive of mild dehydration.  She was documented as tachycardic although was not so on my assessment.  Labs obtained without significant abnormality, mild hyponatremia 130 anticipate this will improve with IV fluid administration that patient was given along with IV Reglan with improvement.  Given she has had confirmatory ultrasound do not feel repeat ultrasound indicated at this time and patient  appropriate for continued home management with currently prescribed Zofran and OB/GYN follow-up.  Return precautions discussed.    Diagnoses as of 03/06/25 1421   Dehydration   Nausea and vomiting, unspecified vomiting type              Pooja Burns MD  03/06/25 1429

## 2025-03-06 NOTE — PROGRESS NOTES
Subjective   Patient ID [id number]   Lizet Storm is a 29 y.o.  at 7w6d with a working estimated date of delivery of 10/17/2025, by Last Menstrual Period who presents for an initial prenatal visit. This pregnancy is planned.    Her pregnancy is complicated by:  Multisubstance abuse  Depression/Anxiety  Cyclical N/V syndrome      OB History    Para Term  AB Living   1 0 0 0 0 0   SAB IAB Ectopic Multiple Live Births   0 0 0 0 0      # Outcome Date GA Lbr Odell/2nd Weight Sex Type Anes PTL Lv   1 Current                 Objective   Physical Exam  Weight: 80.3 kg (177 lb)  Expected Total Weight Gain: 7 kg (15 lb)-11.5 kg (25 lb)   Pregravid BMI: 28.19  BP: 122/86  Weight loss noted      OBGyn Exam  General:  Alert and oriented to person, place, date/time in no apparent distress.  27  BMI.  Pleasant.  HEENT:  Normocephalic, atraumatic head.   Breast: deferred to annual exam.   Abdomen:  Soft, nontender.  Genitourinary:  Gravid, small nontender uterus.   Extremities:  Calves nontender.  No edema.  Skin:  Normal turgor and thickness. No acute rash. Pallorous  ER records reviewed    See below for TV ultrasound    Assessment/Plan   Diagnoses and all orders for this visit:  Pregnancy with uncertain fetal viability, single or unspecified fetus ([facility])  -     POCT UA Automated manually resulted  -     Point of Care Ultrasound  Nausea and vomiting in pregnancy  -     magnesium hydroxide (Milk of Magnesia) 400 mg/5 mL suspension; Take 30 mL by mouth once daily as needed for constipation.  -     ondansetron ODT (Zofran-ODT) 4 mg disintegrating tablet; Dissolve 1 tablet (4 mg) in the mouth every 6 hours.  -     famotidine (Pepcid) 20 mg tablet; Take 1 tablet (20 mg) by mouth 2 times a day.  -     metoclopramide (Reglan) 10 mg tablet; Take 1 tablet (10 mg) by mouth every 6 hours.  -     C. trachomatis / N. gonorrhoeae, Amplified, Urogenital  -     CBC and Auto Differential; Future  -     Rubella Antibody,  IgG; Future  -     Thyroid Stimulating Hormone; Future  -     HIV 1/2 Antigen/Antibody Screen with Reflex to Confirmation; Future  -     Varicella Zoster Antibody, IgG; Future  -     Vitamin D 25-Hydroxy,Total (for eval of Vitamin D levels); Future  -     Urine Culture  -     POCT pregnancy, urine manually resulted  -     Hemoglobin A1C; Future  -     US MAC OB imaging order; Standing  -     US OB 14+ weeks anatomy scan; Future  -     US OB NT (NUCHAL translucency); Future  Constipation in pregnancy in first trimester ([facility])  -     magnesium hydroxide (Milk of Magnesia) 400 mg/5 mL suspension; Take 30 mL by mouth once daily as needed for constipation.  -     Varicella Zoster Antibody, IgG; Future  High risk pregnancy due to maternal drug abuse, antepartum ([facility])  -     C. trachomatis / N. gonorrhoeae, Amplified, Urogenital  -     CBC and Auto Differential; Future  -     Rubella Antibody, IgG; Future  -     Thyroid Stimulating Hormone; Future  -     HIV 1/2 Antigen/Antibody Screen with Reflex to Confirmation; Future  -     Varicella Zoster Antibody, IgG; Future  -     Vitamin D 25-Hydroxy,Total (for eval of Vitamin D levels); Future  -     Urine Culture  -     POCT pregnancy, urine manually resulted  -     Hemoglobin A1C; Future  -     US MAC OB imaging order; Standing  -     US OB 14+ weeks anatomy scan; Future  -     US OB NT (NUCHAL translucency); Future  Right ovarian cyst  -     US MAC OB imaging order; Standing  -     US OB 14+ weeks anatomy scan; Future  Encounter for other screening for genetic and chromosomal anomalies  -     US OB NT (NUCHAL translucency); Future  Screen for STD (sexually transmitted disease)  -     C. trachomatis / N. gonorrhoeae, Amplified, Urogenital  -     HIV 1/2 Antigen/Antibody Screen with Reflex to Confirmation; Future    Prenatal Labs ordered  Daily prenatal vitamins prescribed  First trimester screening and second trimester screening discussed. Patient decided to continue with  pregnancy plan.  Follow up in 4 weeks for return OB care. Will comanage with substance abuse program and MFM  Safety in pregnancy reviewed including but not exclusive to: vaccination, travel, foods, proper hydration, exercise, and weightbearing.  Reviewed CBR option.  Reviewed positive/negative candidacy for Aspirin prophylaxis  beginning as early as 12 weeks GA secondary to detected risks for preeclampsia.    Patient ID: Lizet Storm is a 29 y.o. female.      Performed by: Jocelyn Brizuela DO  Authorized by: Jocelyn Brizuela DO          Genitourinary Indications: evaluation for fetal cardiac activity and pelvic pain              Comments:  transvaginal ultrasound is the technique used in the study for the purposes of confirming pregnancy and estimating gestational age.  A single intrauterine pregnancy is present with a fetal pole, a yolk sac, and an average heart rate of 155 b.p.m. The adnexal areas are unremarkable.  No free pelvic fluid.  Gestational age by average ultrasound measurement of the crown rump length is 7 weeks and 6 days. This is consistent with dating by last menstrual period (LMP) of 7 weeks and 5 days. The final estimated date of delivery based on the ultrasound is 10/17/2025.  This ultrasound is indicated for pregnancy confirmation. Due to the limited scan, fetal or maternal anomalies have not been ruled out.     transvaginal ultrasound is the technique used in the study for the purposes of confirming pregnancy and estimating gestational age.  A single intrauterine pregnancy is present with a fetal pole, a yolk sac, and an average heart rate of 155 b.p.m. The adnexal areas are unremarkable.  No free pelvic fluid.  Gestational age by average ultrasound measurement of the crown rump length is 7 weeks and 6 days. This is consistent with dating by last menstrual period (LMP) of 7 weeks and 5 days. The final estimated date of delivery based on the ultrasound is 10/17/2025.  This ultrasound is  indicated for pregnancy confirmation. Due to the limited scan, fetal or maternal anomalies have not been ruled out.

## 2025-03-07 DIAGNOSIS — E55.9 VITAMIN D DEFICIENCY: ICD-10-CM

## 2025-03-07 DIAGNOSIS — R79.89 LOW TSH LEVEL: Primary | ICD-10-CM

## 2025-03-07 LAB
25(OH)D3+25(OH)D2 SERPL-MCNC: 12 NG/ML (ref 30–100)
BASOPHILS # BLD AUTO: 38 CELLS/UL (ref 0–200)
BASOPHILS NFR BLD AUTO: 0.4 %
C TRACH RRNA SPEC QL NAA+PROBE: NOT DETECTED
EOSINOPHIL # BLD AUTO: 58 CELLS/UL (ref 15–500)
EOSINOPHIL NFR BLD AUTO: 0.6 %
ERYTHROCYTE [DISTWIDTH] IN BLOOD BY AUTOMATED COUNT: 12.6 % (ref 11–15)
HCT VFR BLD AUTO: 41.5 % (ref 35–45)
HGB BLD-MCNC: 14 G/DL (ref 11.7–15.5)
LYMPHOCYTES # BLD AUTO: 1814 CELLS/UL (ref 850–3900)
LYMPHOCYTES NFR BLD AUTO: 18.9 %
MCH RBC QN AUTO: 29.9 PG (ref 27–33)
MCHC RBC AUTO-ENTMCNC: 33.7 G/DL (ref 32–36)
MCV RBC AUTO: 88.7 FL (ref 80–100)
MONOCYTES # BLD AUTO: 883 CELLS/UL (ref 200–950)
MONOCYTES NFR BLD AUTO: 9.2 %
N GONORRHOEA RRNA SPEC QL NAA+PROBE: NOT DETECTED
NEUTROPHILS # BLD AUTO: 6806 CELLS/UL (ref 1500–7800)
NEUTROPHILS NFR BLD AUTO: 70.9 %
PLATELET # BLD AUTO: 306 THOUSAND/UL (ref 140–400)
PMV BLD REES-ECKER: 10.3 FL (ref 7.5–12.5)
QUEST GC CT AMPLIFIED (ALWAYS MESSAGE): NORMAL
RBC # BLD AUTO: 4.68 MILLION/UL (ref 3.8–5.1)
RUBV IGG SERPL IA-ACNC: 2.78 INDEX
VZV IGG SER IA-ACNC: 23.4 S/CO
WBC # BLD AUTO: 9.6 THOUSAND/UL (ref 3.8–10.8)

## 2025-03-07 RX ORDER — VIT C/E/ZN/COPPR/LUTEIN/ZEAXAN 250MG-90MG
25 CAPSULE ORAL DAILY
Qty: 90 CAPSULE | Refills: 3 | Status: SHIPPED | OUTPATIENT
Start: 2025-03-07 | End: 2026-03-07

## 2025-03-08 LAB
BACTERIA UR CULT: NORMAL
T4 FREE SERPL-MCNC: 1.14 NG/DL (ref 0.78–1.48)

## 2025-03-11 ENCOUNTER — OFFICE VISIT (OUTPATIENT)
Dept: OBSTETRICS AND GYNECOLOGY | Facility: CLINIC | Age: 30
End: 2025-03-11
Payer: COMMERCIAL

## 2025-03-11 VITALS — DIASTOLIC BLOOD PRESSURE: 74 MMHG | SYSTOLIC BLOOD PRESSURE: 115 MMHG | HEART RATE: 102 BPM

## 2025-03-11 DIAGNOSIS — Z59.41 FOOD INSECURITY: ICD-10-CM

## 2025-03-11 DIAGNOSIS — O21.9 NAUSEA AND VOMITING IN PREGNANCY: ICD-10-CM

## 2025-03-11 DIAGNOSIS — F12.90 MARIJUANA USE: ICD-10-CM

## 2025-03-11 DIAGNOSIS — F11.90 OPIOID USE DISORDER: ICD-10-CM

## 2025-03-11 DIAGNOSIS — F90.2 ADHD (ATTENTION DEFICIT HYPERACTIVITY DISORDER), COMBINED TYPE: ICD-10-CM

## 2025-03-11 DIAGNOSIS — F11.91 OPIOID USE DISORDER IN REMISSION: Primary | ICD-10-CM

## 2025-03-11 DIAGNOSIS — F33.1 MODERATE EPISODE OF RECURRENT MAJOR DEPRESSIVE DISORDER: Chronic | ICD-10-CM

## 2025-03-11 DIAGNOSIS — F10.11 ALCOHOL ABUSE, IN REMISSION: Chronic | ICD-10-CM

## 2025-03-11 PROCEDURE — 99214 OFFICE O/P EST MOD 30 MIN: CPT | Performed by: OBSTETRICS & GYNECOLOGY

## 2025-03-11 PROCEDURE — 99214 OFFICE O/P EST MOD 30 MIN: CPT | Mod: TH | Performed by: OBSTETRICS & GYNECOLOGY

## 2025-03-11 RX ORDER — BUPRENORPHINE HYDROCHLORIDE AND NALOXONE HYDROCHLORIDE DIHYDRATE 8; 2 MG/1; MG/1
1.5 TABLET SUBLINGUAL DAILY
Qty: 32 TABLET | Refills: 0 | Status: SHIPPED | OUTPATIENT
Start: 2025-03-11 | End: 2025-04-01

## 2025-03-11 SDOH — ECONOMIC STABILITY - FOOD INSECURITY: FOOD INSECURITY: Z59.41

## 2025-03-11 ASSESSMENT — PAIN - FUNCTIONAL ASSESSMENT: PAIN_FUNCTIONAL_ASSESSMENT: 0-10

## 2025-03-11 ASSESSMENT — PAIN SCALES - GENERAL: PAINLEVEL_OUTOF10: 0 - NO PAIN
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
## 2025-03-12 ENCOUNTER — APPOINTMENT (OUTPATIENT)
Dept: OBSTETRICS AND GYNECOLOGY | Facility: CLINIC | Age: 30
End: 2025-03-12
Payer: COMMERCIAL

## 2025-03-12 DIAGNOSIS — K21.00 GASTROESOPHAGEAL REFLUX DISEASE WITH ESOPHAGITIS, UNSPECIFIED WHETHER HEMORRHAGE: ICD-10-CM

## 2025-03-12 RX ORDER — OMEPRAZOLE 40 MG/1
40 CAPSULE, DELAYED RELEASE ORAL
Qty: 30 CAPSULE | Refills: 0 | OUTPATIENT
Start: 2025-03-12

## 2025-03-12 NOTE — TELEPHONE ENCOUNTER
Prescription request received and populated   Pharmacy populated  Last Office Visit: 7/15/24 FOR PHYSICAL  OVERDUE FOR 6 MONTH FOLLOW UP

## 2025-03-20 ENCOUNTER — ROUTINE PRENATAL (OUTPATIENT)
Facility: CLINIC | Age: 30
End: 2025-03-20
Payer: COMMERCIAL

## 2025-03-20 VITALS
HEIGHT: 67 IN | DIASTOLIC BLOOD PRESSURE: 68 MMHG | SYSTOLIC BLOOD PRESSURE: 90 MMHG | BODY MASS INDEX: 29.34 KG/M2 | WEIGHT: 186.9 LBS

## 2025-03-20 DIAGNOSIS — Z34.01 ENCOUNTER FOR SUPERVISION OF NORMAL FIRST PREGNANCY IN FIRST TRIMESTER: ICD-10-CM

## 2025-03-20 DIAGNOSIS — Z3A.09 9 WEEKS GESTATION OF PREGNANCY (HHS-HCC): Primary | ICD-10-CM

## 2025-03-20 DIAGNOSIS — F10.11 ALCOHOL ABUSE, IN REMISSION: Chronic | ICD-10-CM

## 2025-03-20 DIAGNOSIS — F11.91 OPIOID USE DISORDER IN REMISSION: ICD-10-CM

## 2025-03-20 LAB
POC APPEARANCE, URINE: CLEAR
POC BILIRUBIN, URINE: NEGATIVE
POC BLOOD, URINE: NEGATIVE
POC COLOR, URINE: YELLOW
POC GLUCOSE, URINE: NEGATIVE MG/DL
POC KETONES, URINE: NEGATIVE MG/DL
POC LEUKOCYTES, URINE: NEGATIVE
POC NITRITE,URINE: NEGATIVE
POC PH, URINE: 6 PH
POC PROTEIN, URINE: ABNORMAL MG/DL
POC SPECIFIC GRAVITY, URINE: 1.01
POC UROBILINOGEN, URINE: 0.2 EU/DL

## 2025-03-20 PROCEDURE — 99213 OFFICE O/P EST LOW 20 MIN: CPT | Performed by: OBSTETRICS & GYNECOLOGY

## 2025-03-20 PROCEDURE — 81003 URINALYSIS AUTO W/O SCOPE: CPT | Mod: QW | Performed by: OBSTETRICS & GYNECOLOGY

## 2025-03-20 PROCEDURE — 99213 OFFICE O/P EST LOW 20 MIN: CPT | Mod: TH | Performed by: OBSTETRICS & GYNECOLOGY

## 2025-03-20 ASSESSMENT — ENCOUNTER SYMPTOMS
OCCASIONAL FEELINGS OF UNSTEADINESS: 0
LOSS OF SENSATION IN FEET: 0
DEPRESSION: 0

## 2025-03-20 ASSESSMENT — PATIENT HEALTH QUESTIONNAIRE - PHQ9
5. POOR APPETITE OR OVEREATING: MORE THAN HALF THE DAYS
3. TROUBLE FALLING OR STAYING ASLEEP OR SLEEPING TOO MUCH: NEARLY EVERY DAY
8. MOVING OR SPEAKING SO SLOWLY THAT OTHER PEOPLE COULD HAVE NOTICED. OR THE OPPOSITE, BEING SO FIGETY OR RESTLESS THAT YOU HAVE BEEN MOVING AROUND A LOT MORE THAN USUAL: NOT AT ALL
10. IF YOU CHECKED OFF ANY PROBLEMS, HOW DIFFICULT HAVE THESE PROBLEMS MADE IT FOR YOU TO DO YOUR WORK, TAKE CARE OF THINGS AT HOME, OR GET ALONG WITH OTHER PEOPLE: SOMEWHAT DIFFICULT
9. THOUGHTS THAT YOU WOULD BE BETTER OFF DEAD, OR OF HURTING YOURSELF: NOT AT ALL
7. TROUBLE CONCENTRATING ON THINGS, SUCH AS READING THE NEWSPAPER OR WATCHING TELEVISION: SEVERAL DAYS
1. LITTLE INTEREST OR PLEASURE IN DOING THINGS: NOT AT ALL
2. FEELING DOWN, DEPRESSED OR HOPELESS: NEARLY EVERY DAY
4. FEELING TIRED OR HAVING LITTLE ENERGY: SEVERAL DAYS
SUM OF ALL RESPONSES TO PHQ9 QUESTIONS 1 AND 2: 3
6. FEELING BAD ABOUT YOURSELF - OR THAT YOU ARE A FAILURE OR HAVE LET YOURSELF OR YOUR FAMILY DOWN: NEARLY EVERY DAY
SUM OF ALL RESPONSES TO PHQ QUESTIONS 1-9: 13

## 2025-03-20 ASSESSMENT — PAIN - FUNCTIONAL ASSESSMENT: PAIN_FUNCTIONAL_ASSESSMENT: 0-10

## 2025-03-20 ASSESSMENT — PAIN SCALES - GENERAL: PAINLEVEL_OUTOF10: 0 - NO PAIN

## 2025-03-20 NOTE — PROGRESS NOTES
Subjective   Chief Complaint   Patient presents with    Routine Prenatal Visit     Patient ID [id number]   Lizet Storm is a 29 y.o.  at 9w6d with a working estimated date of delivery of 10/17/2025, by Last Menstrual Period who presents for a routine prenatal visit. She denies vaginal bleeding, leakage of fluid, or contractions.    No Headache, no visual changes and no RUQ pain     The following portions of the chart were reviewed this encounter and updated as appropriate:          Objective   Physical Exam  Weight: 84.8 kg (186 lb 14.4 oz)  Expected Total Weight Gain: 7 kg (15 lb)-11.5 kg (25 lb)   Pregravid BMI: 28.19  BP: 90/68    Fundal Height (cm): 9 cm             Prenatal Labs  HEMOGLOBIN   Date Value Ref Range Status   2025 14.0 11.7 - 15.5 g/dL Final     HEMATOCRIT   Date Value Ref Range Status   2025 41.5 35.0 - 45.0 % Final     ABO TYPE   Date Value Ref Range Status   2025 O  Final     Hepatitis B Surface AG   Date Value Ref Range Status   2025 Nonreactive Nonreactive Final     Comment:     Biotin interference may cause falsely decreased results. Patients taking a Biotin dose of up to 5 mg/day should refrain from taking Biotin for 24 hours before sample collection. Providers may contact their local laboratory for  further information.         Assessment/Plan   Diagnoses and all orders for this visit:     Encounter for supervision of normal first pregnancy in first trimester  -     POCT UA Automated manually resulted    Problem List Items Addressed This Visit       Opioid use disorder in remission    Alcohol abuse, in remission (Chronic)     Other Visit Diagnoses       9 weeks gestation of pregnancy ([facility])    -  Primary    Encounter for supervision of normal first pregnancy in first trimester        Relevant Orders    POCT UA Automated manually resulted (Completed)              Continue prenatal vitamin.  Labs reviewed.

## 2025-03-25 DIAGNOSIS — K21.00 GASTROESOPHAGEAL REFLUX DISEASE WITH ESOPHAGITIS, UNSPECIFIED WHETHER HEMORRHAGE: ICD-10-CM

## 2025-03-25 NOTE — TELEPHONE ENCOUNTER
Prescription request received and populated   Pharmacy populated  Last Office Visit: 7/15/24  Patient previously advised 3/12/25 to schedule for 6 mo follow up

## 2025-03-26 RX ORDER — OMEPRAZOLE 40 MG/1
40 CAPSULE, DELAYED RELEASE ORAL
Qty: 30 CAPSULE | Refills: 0 | OUTPATIENT
Start: 2025-03-26

## 2025-03-31 ENCOUNTER — ROUTINE PRENATAL (OUTPATIENT)
Dept: OBSTETRICS AND GYNECOLOGY | Facility: CLINIC | Age: 30
End: 2025-03-31
Payer: COMMERCIAL

## 2025-03-31 VITALS — WEIGHT: 185 LBS | DIASTOLIC BLOOD PRESSURE: 71 MMHG | SYSTOLIC BLOOD PRESSURE: 109 MMHG | BODY MASS INDEX: 28.98 KG/M2

## 2025-03-31 DIAGNOSIS — O99.320 HIGH RISK PREGNANCY DUE TO MATERNAL DRUG ABUSE, ANTEPARTUM: ICD-10-CM

## 2025-03-31 DIAGNOSIS — F19.10 HIGH RISK PREGNANCY DUE TO MATERNAL DRUG ABUSE, ANTEPARTUM: ICD-10-CM

## 2025-03-31 DIAGNOSIS — O21.9 NAUSEA AND VOMITING IN PREGNANCY: ICD-10-CM

## 2025-03-31 DIAGNOSIS — F11.91 OPIOID USE DISORDER IN REMISSION: ICD-10-CM

## 2025-03-31 DIAGNOSIS — F41.9 ANXIETY AND DEPRESSION: ICD-10-CM

## 2025-03-31 DIAGNOSIS — O09.90 HIGH RISK PREGNANCY, ANTEPARTUM (HHS-HCC): Primary | ICD-10-CM

## 2025-03-31 DIAGNOSIS — F32.A ANXIETY AND DEPRESSION: ICD-10-CM

## 2025-03-31 LAB
POC APPEARANCE, URINE: CLEAR
POC BILIRUBIN, URINE: NEGATIVE
POC BLOOD, URINE: NEGATIVE
POC COLOR, URINE: YELLOW
POC GLUCOSE, URINE: NEGATIVE MG/DL
POC KETONES, URINE: NEGATIVE MG/DL
POC LEUKOCYTES, URINE: NEGATIVE
POC NITRITE,URINE: NEGATIVE
POC PH, URINE: 8 PH
POC PROTEIN, URINE: NEGATIVE MG/DL
POC SPECIFIC GRAVITY, URINE: 1.01
POC UROBILINOGEN, URINE: 0.2 EU/DL

## 2025-03-31 PROCEDURE — 99213 OFFICE O/P EST LOW 20 MIN: CPT | Mod: TH | Performed by: OBSTETRICS & GYNECOLOGY

## 2025-03-31 PROCEDURE — 81003 URINALYSIS AUTO W/O SCOPE: CPT | Mod: QW | Performed by: OBSTETRICS & GYNECOLOGY

## 2025-03-31 PROCEDURE — 99213 OFFICE O/P EST LOW 20 MIN: CPT | Performed by: OBSTETRICS & GYNECOLOGY

## 2025-03-31 ASSESSMENT — PAIN - FUNCTIONAL ASSESSMENT: PAIN_FUNCTIONAL_ASSESSMENT: 0-10

## 2025-03-31 ASSESSMENT — PAIN SCALES - GENERAL: PAINLEVEL_OUTOF10: 0 - NO PAIN

## 2025-03-31 NOTE — PROGRESS NOTES
".scgSubjective   Patient ID [id number]   Lizet Storm is a 29 y.o.  at 11w3d with a working estimated date of delivery of 10/17/2025, by Last Menstrual Period who presents for a routine prenatal visit. She denies vaginal bleeding, leakage of fluid, or pelvic pain.  Nausea vomiting is a lot better  Tolerating her medications  Compliant with her program      Objective   Physical Exam  Weight: 83.9 kg (185 lb), Pregravid BMI: 28.19  Expected Total Weight Gain: 7 kg (15 lb)-11.5 kg (25 lb)   BP: 109/71  Fetal Heart Rate: 155      Prenatal Labs  Urine dip:  Lab Results   Component Value Date    KETONESU NEGATIVE 2025     Lab Results   Component Value Date    HGB 14.0 2025    HCT 41.5 2025    ABO O 2025    HEPBSAG Nonreactive 2025     No results found for: \"PAPPA\", \"AFP\", \"HCG\", \"ESTRIOL\", \"INHBA\"    Imaging  The most recent ultrasound was performed on The most recent ultrasound study is not finalized with a study GA of The most recent ultrasound study is not finalized.  The most recent ultrasound study is not finalized  The most recent ultrasound study is not finalized    Assessment/Plan   Diagnoses and all orders for this visit:  High risk pregnancy, antepartum ([facility])  -     POCT UA Automated manually resulted  High risk pregnancy due to maternal drug abuse, antepartum ([facility])  Nausea and vomiting in pregnancy  Opioid use disorder in remission  Anxiety and depression    Continue prenatal vitamin.  Labs reviewed.  Scheduled for NT screen   Order placed for anatomy scan at 20 weeks.  Follow up in 4 weeks for a routine prenatal visit.  " Have Your Skin Lesions Been Treated?: not been treated Is This A New Presentation, Or A Follow-Up?: Skin Lesions

## 2025-04-03 ENCOUNTER — APPOINTMENT (OUTPATIENT)
Dept: OBSTETRICS AND GYNECOLOGY | Facility: CLINIC | Age: 30
End: 2025-04-03
Payer: COMMERCIAL

## 2025-04-04 ENCOUNTER — HOSPITAL ENCOUNTER (OUTPATIENT)
Dept: RADIOLOGY | Facility: CLINIC | Age: 30
Discharge: HOME | End: 2025-04-04
Payer: COMMERCIAL

## 2025-04-04 ENCOUNTER — TELEPHONE (OUTPATIENT)
Dept: OBSTETRICS AND GYNECOLOGY | Facility: CLINIC | Age: 30
End: 2025-04-04
Payer: COMMERCIAL

## 2025-04-04 ENCOUNTER — ANCILLARY ORDERS (OUTPATIENT)
Dept: OBSTETRICS AND GYNECOLOGY | Facility: CLINIC | Age: 30
End: 2025-04-04

## 2025-04-04 DIAGNOSIS — N83.201 RIGHT OVARIAN CYST: ICD-10-CM

## 2025-04-04 DIAGNOSIS — F19.10 HIGH RISK PREGNANCY DUE TO MATERNAL DRUG ABUSE, ANTEPARTUM: ICD-10-CM

## 2025-04-04 DIAGNOSIS — Z98.890 HISTORY OF LOOP ELECTROSURGICAL EXCISION PROCEDURE (LEEP) OF CERVIX: ICD-10-CM

## 2025-04-04 DIAGNOSIS — O21.9 NAUSEA AND VOMITING IN PREGNANCY: ICD-10-CM

## 2025-04-04 DIAGNOSIS — O99.320 HIGH RISK PREGNANCY DUE TO MATERNAL DRUG ABUSE, ANTEPARTUM: ICD-10-CM

## 2025-04-04 DIAGNOSIS — O21.9 NAUSEA AND VOMITING IN PREGNANCY: Primary | ICD-10-CM

## 2025-04-04 PROCEDURE — 76813 OB US NUCHAL MEAS 1 GEST: CPT

## 2025-04-04 PROCEDURE — 76816 OB US FOLLOW-UP PER FETUS: CPT

## 2025-04-04 NOTE — TELEPHONE ENCOUNTER
----- Message from Nurse Mary DE LUNA sent at 3/26/2025 11:34 AM EDT -----  Regarding: [facility] appt.  Patient left message, needs appointment with Dr Lambert.  Has 9 days left of her Suboxone, but has increased her dose??  Left message to call RN.    3/28/25- No return call.  MyChart message sent.  baron   4/3/25 - Message to Dr Lambert- patient has appointments at [facility] with Dr Domínguez   4/4/25 - to send [facility] termination letter per Dr Lambert.

## 2025-04-08 ENCOUNTER — APPOINTMENT (OUTPATIENT)
Dept: INTEGRATIVE MEDICINE | Facility: CLINIC | Age: 30
End: 2025-04-08
Payer: COMMERCIAL

## 2025-04-14 DIAGNOSIS — O09.90 HIGH RISK PREGNANCY, ANTEPARTUM (HHS-HCC): ICD-10-CM

## 2025-04-14 DIAGNOSIS — Z13.79 ENCOUNTER FOR OTHER SCREENING FOR GENETIC AND CHROMOSOMAL ANOMALIES: Primary | ICD-10-CM

## 2025-04-14 DIAGNOSIS — Z3A.13 13 WEEKS GESTATION OF PREGNANCY (HHS-HCC): ICD-10-CM

## 2025-04-15 ENCOUNTER — LAB (OUTPATIENT)
Dept: LAB | Facility: HOSPITAL | Age: 30
End: 2025-04-15
Payer: COMMERCIAL

## 2025-04-15 DIAGNOSIS — O09.90 SUPERVISION OF HIGH RISK PREGNANCY, UNSPECIFIED, UNSPECIFIED TRIMESTER (HHS-HCC): ICD-10-CM

## 2025-04-15 DIAGNOSIS — Z13.79 ENCOUNTER FOR OTHER SCREENING FOR GENETIC AND CHROMOSOMAL ANOMALIES: Primary | ICD-10-CM

## 2025-04-15 DIAGNOSIS — Z3A.13 13 WEEKS GESTATION OF PREGNANCY (HHS-HCC): ICD-10-CM

## 2025-04-30 ENCOUNTER — APPOINTMENT (OUTPATIENT)
Dept: OBSTETRICS AND GYNECOLOGY | Facility: CLINIC | Age: 30
End: 2025-04-30
Payer: COMMERCIAL

## 2025-05-03 LAB
COMMENTS - MP RESULT TYPE: NORMAL
SCAN RESULT: NORMAL

## 2025-05-05 ENCOUNTER — ROUTINE PRENATAL (OUTPATIENT)
Dept: OBSTETRICS AND GYNECOLOGY | Facility: CLINIC | Age: 30
End: 2025-05-05
Payer: COMMERCIAL

## 2025-05-05 VITALS — DIASTOLIC BLOOD PRESSURE: 63 MMHG | BODY MASS INDEX: 28.57 KG/M2 | WEIGHT: 182.4 LBS | SYSTOLIC BLOOD PRESSURE: 100 MMHG

## 2025-05-05 DIAGNOSIS — K59.00 CONSTIPATION DURING PREGNANCY, ANTEPARTUM (HHS-HCC): ICD-10-CM

## 2025-05-05 DIAGNOSIS — Z3A.16 16 WEEKS GESTATION OF PREGNANCY (HHS-HCC): ICD-10-CM

## 2025-05-05 DIAGNOSIS — F41.9 ANXIETY AND DEPRESSION: ICD-10-CM

## 2025-05-05 DIAGNOSIS — F11.91 OPIOID USE DISORDER IN REMISSION: ICD-10-CM

## 2025-05-05 DIAGNOSIS — Z98.890 HISTORY OF LOOP ELECTROSURGICAL EXCISION PROCEDURE (LEEP) OF CERVIX: ICD-10-CM

## 2025-05-05 DIAGNOSIS — F19.10 HIGH RISK PREGNANCY DUE TO MATERNAL DRUG ABUSE, ANTEPARTUM: Primary | ICD-10-CM

## 2025-05-05 DIAGNOSIS — F32.A ANXIETY AND DEPRESSION: ICD-10-CM

## 2025-05-05 DIAGNOSIS — O99.619 CONSTIPATION DURING PREGNANCY, ANTEPARTUM (HHS-HCC): ICD-10-CM

## 2025-05-05 DIAGNOSIS — O99.320 HIGH RISK PREGNANCY DUE TO MATERNAL DRUG ABUSE, ANTEPARTUM: Primary | ICD-10-CM

## 2025-05-05 PROBLEM — Z86.59 HISTORY OF ATTENTION DEFICIT HYPERACTIVITY DISORDER (ADHD): Status: ACTIVE | Noted: 2025-05-01

## 2025-05-05 PROBLEM — F11.21 OPIOID USE DISORDER, SEVERE, IN EARLY REMISSION: Chronic | Status: ACTIVE | Noted: 2021-06-08

## 2025-05-05 PROBLEM — F41.1 GAD (GENERALIZED ANXIETY DISORDER): Status: ACTIVE | Noted: 2025-05-01

## 2025-05-05 PROBLEM — F43.10 POST TRAUMATIC STRESS DISORDER: Status: ACTIVE | Noted: 2025-05-01

## 2025-05-05 PROBLEM — F19.90 SUBSTANCE USE DISORDER: Status: ACTIVE | Noted: 2025-03-03

## 2025-05-05 PROBLEM — F10.10 ALCOHOL ABUSE: Status: RESOLVED | Noted: 2021-06-08 | Resolved: 2025-05-05

## 2025-05-05 LAB
POC APPEARANCE, URINE: CLEAR
POC BILIRUBIN, URINE: NEGATIVE
POC BLOOD, URINE: NEGATIVE
POC COLOR, URINE: YELLOW
POC GLUCOSE, URINE: NEGATIVE MG/DL
POC KETONES, URINE: NEGATIVE MG/DL
POC LEUKOCYTES, URINE: NEGATIVE
POC NITRITE,URINE: NEGATIVE
POC PH, URINE: 7 PH
POC PROTEIN, URINE: NEGATIVE MG/DL
POC SPECIFIC GRAVITY, URINE: 1.01
POC UROBILINOGEN, URINE: 0.2 EU/DL

## 2025-05-05 PROCEDURE — 99213 OFFICE O/P EST LOW 20 MIN: CPT | Performed by: OBSTETRICS & GYNECOLOGY

## 2025-05-05 PROCEDURE — 81003 URINALYSIS AUTO W/O SCOPE: CPT | Mod: QW | Performed by: OBSTETRICS & GYNECOLOGY

## 2025-05-05 PROCEDURE — 99213 OFFICE O/P EST LOW 20 MIN: CPT | Mod: TH,25 | Performed by: OBSTETRICS & GYNECOLOGY

## 2025-05-05 RX ORDER — DOCUSATE SODIUM 100 MG/1
100 CAPSULE, LIQUID FILLED ORAL DAILY
Qty: 60 CAPSULE | Refills: 3 | Status: SHIPPED | OUTPATIENT
Start: 2025-05-05 | End: 2025-07-04

## 2025-05-05 ASSESSMENT — LIFESTYLE VARIABLES
HOW MANY STANDARD DRINKS CONTAINING ALCOHOL DO YOU HAVE ON A TYPICAL DAY: PATIENT DOES NOT DRINK
SKIP TO QUESTIONS 9-10: 1
HOW OFTEN DO YOU HAVE A DRINK CONTAINING ALCOHOL: NEVER
AUDIT-C TOTAL SCORE: 0
HOW OFTEN DO YOU HAVE SIX OR MORE DRINKS ON ONE OCCASION: NEVER

## 2025-05-05 ASSESSMENT — SOCIAL DETERMINANTS OF HEALTH (SDOH)

## 2025-05-05 ASSESSMENT — ENCOUNTER SYMPTOMS
DEPRESSION: 0
OCCASIONAL FEELINGS OF UNSTEADINESS: 0
LOSS OF SENSATION IN FEET: 0

## 2025-05-05 ASSESSMENT — PAIN SCALES - GENERAL: PAINLEVEL_OUTOF10: 0 - NO PAIN

## 2025-05-05 ASSESSMENT — PAIN - FUNCTIONAL ASSESSMENT: PAIN_FUNCTIONAL_ASSESSMENT: 0-10

## 2025-05-05 NOTE — LETTER
May 5, 2025     Patient: Lizet Storm   YOB: 1995   Date of Visit: 5/5/2025       To Whom It May Concern:    Lizet Storm was seen in my clinic on 5/5/2025 at 2:15 pm. Please excuse Lizet for her absence from work on this day to make the appointment.    If you have any questions or concerns, please don't hesitate to call.         Sincerely,         Jocelyn Brizuela DO        CC: No Recipients

## 2025-05-05 NOTE — PROGRESS NOTES
"Subjective   Patient ID [id number]   Lizet Storm is a 29 y.o.  at 16w3d with a working estimated date of delivery of 10/17/2025, by Last Menstrual Period who presents for a routine prenatal visit. She denies vaginal bleeding, leakage of fluid, decreased fetal movements, or contractions.    Her pregnancy is complicated by:  Substance use disorder - in recovery, [facility]    Objective   Physical Exam  Weight: 82.7 kg (182 lb 6.4 oz)  Expected Total Weight Gain: 7 kg (15 lb)-11.5 kg (25 lb)   Pregravid BMI: 28.19  BP: 100/63  Fetal Heart Rate: 130 Fundal Height (cm): 16 cm    Prenatal Labs  Urine dip:  Lab Results   Component Value Date    KETONESU NEGATIVE 2025       Lab Results   Component Value Date    HGB 14.0 2025    HCT 41.5 2025    ABO O 2025    HEPBSAG Nonreactive 2025     No results found for: \"PAPPA\", \"AFP\", \"HCG\", \"ESTRIOL\", \"INHBA\"  No results found for: \"GLUF\", \"GLUT1\", \"TTEMSQD4FD\", \"PMVIQQY4II\"       Assessment/Plan   Diagnoses and all orders for this visit:  High risk pregnancy due to maternal drug abuse, antepartum  -     POCT UA Automated manually resulted  Opioid use disorder in remission  Anxiety and depression  History of loop electrosurgical excision procedure (LEEP) of cervix  16 weeks gestation of pregnancy ([facility])  Constipation during pregnancy, antepartum ([facility])  -     docusate sodium (Colace) 100 mg capsule; Take 1 capsule (100 mg) by mouth once daily. 1 capsule by mouth missing once daily to soften stool      Continue prenatal vitamin.  Labs reviewed.  Follow up in 4  weeks for a routine prenatal visit.  Reviewed safety, preventing constipation with fiber and colace  "

## 2025-05-11 ENCOUNTER — HOSPITAL ENCOUNTER (EMERGENCY)
Facility: HOSPITAL | Age: 30
Discharge: PSYCHIATRIC OR SUBSTANCE USE FACILITY | End: 2025-05-11
Attending: STUDENT IN AN ORGANIZED HEALTH CARE EDUCATION/TRAINING PROGRAM
Payer: COMMERCIAL

## 2025-05-11 VITALS
HEIGHT: 67 IN | HEART RATE: 90 BPM | TEMPERATURE: 97.2 F | OXYGEN SATURATION: 99 % | BODY MASS INDEX: 29.03 KG/M2 | SYSTOLIC BLOOD PRESSURE: 117 MMHG | RESPIRATION RATE: 16 BRPM | DIASTOLIC BLOOD PRESSURE: 67 MMHG | WEIGHT: 185 LBS

## 2025-05-11 DIAGNOSIS — Z3A.17 17 WEEKS GESTATION OF PREGNANCY (HHS-HCC): ICD-10-CM

## 2025-05-11 DIAGNOSIS — O21.9 NAUSEA AND VOMITING DURING PREGNANCY: Primary | ICD-10-CM

## 2025-05-11 LAB
ALBUMIN SERPL BCP-MCNC: 3.8 G/DL (ref 3.4–5)
ALP SERPL-CCNC: 38 U/L (ref 33–110)
ALT SERPL W P-5'-P-CCNC: 11 U/L (ref 7–45)
ANION GAP SERPL CALCULATED.3IONS-SCNC: 12 MMOL/L (ref 10–20)
APPEARANCE UR: CLEAR
AST SERPL W P-5'-P-CCNC: 15 U/L (ref 9–39)
BASOPHILS # BLD AUTO: 0.02 X10*3/UL (ref 0–0.1)
BASOPHILS NFR BLD AUTO: 0.2 %
BILIRUB SERPL-MCNC: 0.4 MG/DL (ref 0–1.2)
BILIRUB UR STRIP.AUTO-MCNC: NEGATIVE MG/DL
BUN SERPL-MCNC: 6 MG/DL (ref 6–23)
CALCIUM SERPL-MCNC: 8.5 MG/DL (ref 8.6–10.3)
CHLORIDE SERPL-SCNC: 107 MMOL/L (ref 98–107)
CO2 SERPL-SCNC: 17 MMOL/L (ref 21–32)
COLOR UR: YELLOW
CREAT SERPL-MCNC: 0.34 MG/DL (ref 0.5–1.05)
EGFRCR SERPLBLD CKD-EPI 2021: >90 ML/MIN/1.73M*2
EOSINOPHIL # BLD AUTO: 0.01 X10*3/UL (ref 0–0.7)
EOSINOPHIL NFR BLD AUTO: 0.1 %
ERYTHROCYTE [DISTWIDTH] IN BLOOD BY AUTOMATED COUNT: 12.3 % (ref 11.5–14.5)
GLUCOSE SERPL-MCNC: 121 MG/DL (ref 74–99)
GLUCOSE UR STRIP.AUTO-MCNC: ABNORMAL MG/DL
HCT VFR BLD AUTO: 32.4 % (ref 36–46)
HGB BLD-MCNC: 11.1 G/DL (ref 12–16)
IMM GRANULOCYTES # BLD AUTO: 0.05 X10*3/UL (ref 0–0.7)
IMM GRANULOCYTES NFR BLD AUTO: 0.5 % (ref 0–0.9)
KETONES UR STRIP.AUTO-MCNC: ABNORMAL MG/DL
LEUKOCYTE ESTERASE UR QL STRIP.AUTO: NEGATIVE
LIPASE SERPL-CCNC: 11 U/L (ref 9–82)
LYMPHOCYTES # BLD AUTO: 0.87 X10*3/UL (ref 1.2–4.8)
LYMPHOCYTES NFR BLD AUTO: 8.4 %
MCH RBC QN AUTO: 30 PG (ref 26–34)
MCHC RBC AUTO-ENTMCNC: 34.3 G/DL (ref 32–36)
MCV RBC AUTO: 88 FL (ref 80–100)
MONOCYTES # BLD AUTO: 0.19 X10*3/UL (ref 0.1–1)
MONOCYTES NFR BLD AUTO: 1.8 %
MUCOUS THREADS #/AREA URNS AUTO: NORMAL /LPF
NEUTROPHILS # BLD AUTO: 9.23 X10*3/UL (ref 1.2–7.7)
NEUTROPHILS NFR BLD AUTO: 89 %
NITRITE UR QL STRIP.AUTO: NEGATIVE
NRBC BLD-RTO: 0 /100 WBCS (ref 0–0)
PH UR STRIP.AUTO: 7 [PH]
PLATELET # BLD AUTO: 178 X10*3/UL (ref 150–450)
POTASSIUM SERPL-SCNC: 3.2 MMOL/L (ref 3.5–5.3)
PROT SERPL-MCNC: 6.3 G/DL (ref 6.4–8.2)
PROT UR STRIP.AUTO-MCNC: ABNORMAL MG/DL
RBC # BLD AUTO: 3.7 X10*6/UL (ref 4–5.2)
RBC # UR STRIP.AUTO: NEGATIVE MG/DL
RBC #/AREA URNS AUTO: NORMAL /HPF
SODIUM SERPL-SCNC: 133 MMOL/L (ref 136–145)
SP GR UR STRIP.AUTO: 1.02
SQUAMOUS #/AREA URNS AUTO: NORMAL /HPF
UROBILINOGEN UR STRIP.AUTO-MCNC: NORMAL MG/DL
WBC # BLD AUTO: 10.4 X10*3/UL (ref 4.4–11.3)
WBC #/AREA URNS AUTO: NORMAL /HPF

## 2025-05-11 PROCEDURE — 85025 COMPLETE CBC W/AUTO DIFF WBC: CPT

## 2025-05-11 PROCEDURE — 80053 COMPREHEN METABOLIC PANEL: CPT

## 2025-05-11 PROCEDURE — 36415 COLL VENOUS BLD VENIPUNCTURE: CPT

## 2025-05-11 PROCEDURE — 81001 URINALYSIS AUTO W/SCOPE: CPT

## 2025-05-11 PROCEDURE — 96374 THER/PROPH/DIAG INJ IV PUSH: CPT

## 2025-05-11 PROCEDURE — 2500000004 HC RX 250 GENERAL PHARMACY W/ HCPCS (ALT 636 FOR OP/ED): Mod: JZ | Performed by: STUDENT IN AN ORGANIZED HEALTH CARE EDUCATION/TRAINING PROGRAM

## 2025-05-11 PROCEDURE — 96361 HYDRATE IV INFUSION ADD-ON: CPT

## 2025-05-11 PROCEDURE — 2500000004 HC RX 250 GENERAL PHARMACY W/ HCPCS (ALT 636 FOR OP/ED)

## 2025-05-11 PROCEDURE — 83690 ASSAY OF LIPASE: CPT

## 2025-05-11 PROCEDURE — 99284 EMERGENCY DEPT VISIT MOD MDM: CPT | Mod: 25 | Performed by: STUDENT IN AN ORGANIZED HEALTH CARE EDUCATION/TRAINING PROGRAM

## 2025-05-11 PROCEDURE — 96375 TX/PRO/DX INJ NEW DRUG ADDON: CPT

## 2025-05-11 RX ORDER — DIPHENHYDRAMINE HYDROCHLORIDE 50 MG/ML
25 INJECTION, SOLUTION INTRAMUSCULAR; INTRAVENOUS ONCE
Status: DISCONTINUED | OUTPATIENT
Start: 2025-05-11 | End: 2025-05-11

## 2025-05-11 RX ORDER — PROCHLORPERAZINE EDISYLATE 5 MG/ML
10 INJECTION INTRAMUSCULAR; INTRAVENOUS ONCE
Status: COMPLETED | OUTPATIENT
Start: 2025-05-11 | End: 2025-05-11

## 2025-05-11 RX ORDER — METOCLOPRAMIDE 10 MG/1
10 TABLET ORAL EVERY 8 HOURS PRN
Qty: 30 TABLET | Refills: 0 | Status: SHIPPED | OUTPATIENT
Start: 2025-05-11 | End: 2025-05-11

## 2025-05-11 RX ORDER — METOCLOPRAMIDE 10 MG/1
10 TABLET ORAL EVERY 8 HOURS PRN
Qty: 30 TABLET | Refills: 0 | Status: SHIPPED | OUTPATIENT
Start: 2025-05-11

## 2025-05-11 RX ORDER — METOCLOPRAMIDE HYDROCHLORIDE 5 MG/ML
10 INJECTION INTRAMUSCULAR; INTRAVENOUS ONCE
Status: COMPLETED | OUTPATIENT
Start: 2025-05-11 | End: 2025-05-11

## 2025-05-11 RX ADMIN — METOCLOPRAMIDE 10 MG: 5 INJECTION, SOLUTION INTRAMUSCULAR; INTRAVENOUS at 16:21

## 2025-05-11 RX ADMIN — SODIUM CHLORIDE, SODIUM LACTATE, POTASSIUM CHLORIDE, AND CALCIUM CHLORIDE 1000 ML: 600; 310; 30; 20 INJECTION, SOLUTION INTRAVENOUS at 13:21

## 2025-05-11 RX ADMIN — PROCHLORPERAZINE EDISYLATE 10 MG: 5 INJECTION INTRAMUSCULAR; INTRAVENOUS at 13:22

## 2025-05-11 RX ADMIN — SODIUM CHLORIDE, SODIUM LACTATE, POTASSIUM CHLORIDE, AND CALCIUM CHLORIDE 1000 ML: 600; 310; 30; 20 INJECTION, SOLUTION INTRAVENOUS at 13:14

## 2025-05-11 NOTE — ED NOTES
Upon arrival. Pt c.o N/V. Pt restless and agitated. Pt checked herself into [facility] for Kratum detox. Pt is 17 weeks pregnant.      Azul Saez RN  05/11/25 3932

## 2025-05-11 NOTE — DISCHARGE INSTRUCTIONS
Utilize Reglan as needed.    Be sure to take all medications, over the counter medications or prescription medications only as directed.    Be sure to follow up as directed in 1-2 days. All of the details of your follow up instructions are detailed in the follow up section of this packet.    If you are being discharged with any pains medications or muscle relaxers (norco, Vicodin, hydrocodone products, Percocet, oxycodone products, flexeril, cyclobenzaprine, robaxin, norflex, brand or generic, or any other pain controlling medications with the exception of Ibuprofen and regular Tylenol, do not drive or operate machinery, climb ladders or participate in any activity that could potentially put yourself or others at risk should you get dizzy, or be/feel impaired at all.    Return to emergency room without delay for ANY new or worsening pains or for any other symptoms or concerns. Return with worsening pains, nausea, vomiting, trouble breathing, palpitations, shortness of breath, inability to pass stool or urine, loss of control of stool or urine, any numbness or tingling (that is not normal for you), uncontrolled fevers, the passing of blood or other material in stool or urine, rashes, pains or for any other symptoms or concerns you may have. You are always welcome to return to the ER at any time for any reason or for any other concerns you may have.

## 2025-05-11 NOTE — ED PROVIDER NOTES
HPI   No chief complaint on file.      Patient is a 29-year-old female presenting from [facility] with concerns for nausea and vomiting.  Reported started around 8 or 9 AM.  She was given Zofran which was ineffective.  She does have a history of hyperemesis gravidarum.  She is at [facility] for detox.  She is detoxing from krato.  She has been using this for months.  She follows up with OB/GYN, Dr. Brizuela at [facility] for her pregnancy.  No abdominal pain, cramping or vaginal discharge.  Patient denies fevers, chills, cough, sore throat, runny nose, chest pain, shortness of breath, abdominal pain, diarrhea or urinary complaints.              Patient History   Medical History[1]  Surgical History[2]  Family History[3]  Social History[4]    Physical Exam   ED Triage Vitals [05/11/25 1305]   Temperature Heart Rate Respirations BP   36.2 °C (97.2 °F) 89 16 124/67      Pulse Ox Temp Source Heart Rate Source Patient Position   97 % Oral Monitor --      BP Location FiO2 (%)     -- --       Physical Exam  Vitals and nursing note reviewed.   Constitutional:       Appearance: She is well-developed.      Comments: Awake, laying in examination bed   HENT:      Head: Normocephalic and atraumatic.      Nose: Nose normal.      Mouth/Throat:      Mouth: Mucous membranes are moist.      Pharynx: Oropharynx is clear.   Eyes:      Extraocular Movements: Extraocular movements intact.      Conjunctiva/sclera: Conjunctivae normal.      Pupils: Pupils are equal, round, and reactive to light.   Cardiovascular:      Rate and Rhythm: Normal rate and regular rhythm.      Pulses: Normal pulses.      Heart sounds: Normal heart sounds. No murmur heard.  Pulmonary:      Effort: Pulmonary effort is normal. No respiratory distress.      Breath sounds: Normal breath sounds.   Abdominal:      General: Abdomen is flat.      Palpations: Abdomen is soft.      Tenderness: There is no abdominal tenderness.   Musculoskeletal:          No more ibuprofen until 4 pm.     Can take more tylenol and one more imitrex.    General: No swelling. Normal range of motion.      Cervical back: Normal range of motion and neck supple.   Skin:     General: Skin is warm and dry.      Capillary Refill: Capillary refill takes less than 2 seconds.   Neurological:      General: No focal deficit present.      Mental Status: She is alert and oriented to person, place, and time.   Psychiatric:         Mood and Affect: Mood normal.         Behavior: Behavior normal.           ED Course & MDM   Diagnoses as of 05/11/25 1633   Nausea and vomiting during pregnancy   17 weeks gestation of pregnancy ([facility])                 No data recorded     Romario Coma Scale Score: 15 (05/11/25 1303 : Azul Saez, RN)                           Medical Decision Making  Patient is a 29-year-old female presenting from [facility] with concerns for nausea and vomiting.  Labwork, urine, medication ordered.  Conditions considered include but are not limited to: Withdrawal, hyperemesis gravidarum.      I saw this patient in conjunction with Dr. Michel.  Labs unremarkable.  UA with micro without infection.  Patient given IV fluids and Compazine.  Still feeling slightly nauseous.  Reglan given.  Patient feeling improved.    I believe this patient is at low risk for complication, and a disposition of discharge is acceptable.  Return to the Emergency Department if new or worsening symptoms including headache, fever, chills, chest pain, shortness of breath, syncope, near syncope, abdominal pain, nausea, vomiting,  diarrhea, or worsening pain.  Written prescription for Reglan written.  Patient will be discharged back to [facility].    Portions of this note made with Dragon software, please be mindful of potential grammatical errors.      Medications   lactated Ringer's bolus 1,000 mL (0 mL intravenous Stopped 5/11/25 1621)   prochlorperazine (Compazine) injection 10 mg (10 mg intravenous Given 5/11/25 1322)   metoclopramide (Reglan) injection 10 mg (10 mg  intravenous Given 5/11/25 1621)     Labs Reviewed   COMPREHENSIVE METABOLIC PANEL - Abnormal       Result Value    Glucose 121 (*)     Sodium 133 (*)     Potassium 3.2 (*)     Chloride 107      Bicarbonate 17 (*)     Anion Gap 12      Urea Nitrogen 6      Creatinine 0.34 (*)     eGFR >90      Calcium 8.5 (*)     Albumin 3.8      Alkaline Phosphatase 38      Total Protein 6.3 (*)     AST 15      Bilirubin, Total 0.4      ALT 11     CBC WITH AUTO DIFFERENTIAL - Abnormal    WBC 10.4      nRBC 0.0      RBC 3.70 (*)     Hemoglobin 11.1 (*)     Hematocrit 32.4 (*)     MCV 88      MCH 30.0      MCHC 34.3      RDW 12.3      Platelets 178      Neutrophils % 89.0      Immature Granulocytes %, Automated 0.5      Lymphocytes % 8.4      Monocytes % 1.8      Eosinophils % 0.1      Basophils % 0.2      Neutrophils Absolute 9.23 (*)     Immature Granulocytes Absolute, Automated 0.05      Lymphocytes Absolute 0.87 (*)     Monocytes Absolute 0.19      Eosinophils Absolute 0.01      Basophils Absolute 0.02     URINALYSIS WITH REFLEX CULTURE AND MICROSCOPIC - Abnormal    Color, Urine Yellow      Appearance, Urine Clear      Specific Gravity, Urine 1.024      pH, Urine 7.0      Protein, Urine 10 (TRACE)      Glucose, Urine 50 (TRACE) (*)     Blood, Urine NEGATIVE      Ketones, Urine OVER (4+) (*)     Bilirubin, Urine NEGATIVE      Urobilinogen, Urine Normal      Nitrite, Urine NEGATIVE      Leukocyte Esterase, Urine NEGATIVE      Narrative:     OVER is reported when the result is greater than the clinically reportable range.   LIPASE - Normal    Lipase 11      Narrative:     Venipuncture immediately after or during the administration of Metamizole may lead to falsely low results. Testing should be performed immediately prior to Metamizole dosing.   URINALYSIS WITH REFLEX CULTURE AND MICROSCOPIC    Narrative:     The following orders were created for panel order Urinalysis with Reflex Culture and Microscopic.  Procedure                                Abnormality         Status                     ---------                               -----------         ------                     Urinalysis with Reflex C...[[id number]]  Abnormal            Final result               Extra Urine Gray Tube[[id number]]                            In process                   Please view results for these tests on the individual orders.   EXTRA URINE GRAY TUBE   URINALYSIS MICROSCOPIC WITH REFLEX CULTURE    WBC, Urine NONE      RBC, Urine NONE      Squamous Epithelial Cells, Urine 1-9 (SPARSE)      Mucus, Urine FEW           Procedure  Procedures       [1]   Past Medical History:  Diagnosis Date    Abnormal Pap smear of cervix     ADD (attention deficit disorder)     on Adderall ER 15mg QD + Adderall IR 10mg QPM    Alcohol abuse 06/08/2021    Cervical dysplasia     history of LEEP in 20s. Neg cotest Feb 2024.    Depression     on Celexa, doxepin, trazodone    Female infertility     History of dehydration     3/2025    HPV (human papilloma virus) infection     Polysubstance use disorder     Cocaine (last use 2023), Alcohol (last use 2023), kratom (last use Jan 2025) THC (last use Feb 2025)    Recovering alcoholic in remission (Multi)    [2]   Past Surgical History:  Procedure Laterality Date    CERVICAL BIOPSY  W/ LOOP ELECTRODE EXCISION      COSMETIC SURGERY  2022    BBL & lipo    TONSILLECTOMY     [3]   Family History  Problem Relation Name Age of Onset    Accidental death Father Ino     Alcohol abuse Father Ino     Depression Father Ino     Drug abuse Father Ino     Mental illness Father Ino    [4]   Social History  Tobacco Use    Smoking status: Every Day     Current packs/day: 0.25     Average packs/day: 0.3 packs/day for 15.0 years (3.8 ttl pk-yrs)     Types: Cigarettes     Passive exposure: Never    Smokeless tobacco: Never    Tobacco comments:     Patient is a willing participant in Detox programs. Desires a family.    Vaping Use    Vaping status:  Every Day    Substances: Nicotine, THC, CBD, Flavoring    Devices: Disposable, Pre-filled or refillable cartridge   Substance Use Topics    Alcohol use: Not Currently     Comment: States used to be a really bad alcoholic    Drug use: Yes     Types: Marijuana     Comment: THC (Heavy), Kratum (heavy-detox at [facility]). Hx of Heroin abuse - recovered.        Jordan Dick PA-C  05/11/25 0164

## 2025-05-12 ENCOUNTER — APPOINTMENT (OUTPATIENT)
Dept: NUTRITION | Facility: CLINIC | Age: 30
End: 2025-05-12
Payer: COMMERCIAL

## 2025-05-12 LAB — HOLD SPECIMEN: NORMAL

## 2025-05-15 ENCOUNTER — SOCIAL WORK (OUTPATIENT)
Dept: OBSTETRICS AND GYNECOLOGY | Facility: CLINIC | Age: 30
End: 2025-05-15
Payer: COMMERCIAL

## 2025-05-22 ENCOUNTER — TELEPHONE (OUTPATIENT)
Dept: OBSTETRICS AND GYNECOLOGY | Facility: CLINIC | Age: 30
End: 2025-05-22
Payer: COMMERCIAL

## 2025-05-22 NOTE — TELEPHONE ENCOUNTER
OB 18 wks, last seen 5.5.25 called into the office she left a vm c/o light-mild cramping x 2days, stated more than usual.     Nurse attempted to contact ptconchita back

## 2025-05-22 NOTE — TELEPHONE ENCOUNTER
Spoke to pt, states cramping is more with movement, and states she moves a lot. Pt stated she's since taken tylenol with relief. Pt aware it is ok to take tylenol through out her pregnancy. Pt would like a belly band for support. Told her a message will be sent to Dr. Brizuela.     Pt denied vaginal bleeding or any other s/s at this time. Advised pt to monitor s/s, if worsening or having vaginal bleeding to contact the office.     Pt verbalized understanding.

## 2025-05-23 ENCOUNTER — HOSPITAL ENCOUNTER (OUTPATIENT)
Dept: RADIOLOGY | Facility: CLINIC | Age: 30
Discharge: HOME | End: 2025-05-23
Payer: COMMERCIAL

## 2025-05-23 DIAGNOSIS — N83.201 RIGHT OVARIAN CYST: ICD-10-CM

## 2025-05-23 DIAGNOSIS — O99.320 HIGH RISK PREGNANCY DUE TO MATERNAL DRUG ABUSE, ANTEPARTUM: ICD-10-CM

## 2025-05-23 DIAGNOSIS — F19.10 HIGH RISK PREGNANCY DUE TO MATERNAL DRUG ABUSE, ANTEPARTUM: ICD-10-CM

## 2025-05-23 DIAGNOSIS — Z98.890 HISTORY OF LOOP ELECTROSURGICAL EXCISION PROCEDURE (LEEP) OF CERVIX: ICD-10-CM

## 2025-05-23 DIAGNOSIS — O21.9 NAUSEA AND VOMITING IN PREGNANCY: ICD-10-CM

## 2025-05-23 PROCEDURE — 76811 OB US DETAILED SNGL FETUS: CPT

## 2025-05-27 ENCOUNTER — APPOINTMENT (OUTPATIENT)
Dept: OBSTETRICS AND GYNECOLOGY | Facility: CLINIC | Age: 30
End: 2025-05-27
Payer: COMMERCIAL

## 2025-06-03 ENCOUNTER — TELEPHONE (OUTPATIENT)
Dept: OBSTETRICS AND GYNECOLOGY | Facility: CLINIC | Age: 30
End: 2025-06-03
Payer: COMMERCIAL

## 2025-06-03 NOTE — TELEPHONE ENCOUNTER
Est OB patient 20 weeks pregnant calling with complaints of mild ankle swelling. Pt informed to elevate her feet, drink water, and wear compression stockings. Pt states she only has 1 pair of compression stockings. Pt also stated that she keeps getting numbness and tingling in her hands and wants to know if that is pregnancy related or because of carpal tunnel? Any other suggestions? Please advise.

## 2025-06-04 NOTE — TELEPHONE ENCOUNTER
Spoke to pt via phone, pt states swelling (denies pain) in both ankles and this persistent. Pt states she sits and stands, but does more sitting. Advised pt to elevate her legs.     Pt states tingling is from elbow down.     Pt aware she schedule an appt with Dr. Brizuela.     Pt states she does she Dr. Brizuela tomorrow and will go over complaints tomorrow while at visit.

## 2025-06-05 ENCOUNTER — ROUTINE PRENATAL (OUTPATIENT)
Dept: OBSTETRICS AND GYNECOLOGY | Facility: CLINIC | Age: 30
End: 2025-06-05
Payer: COMMERCIAL

## 2025-06-05 VITALS — BODY MASS INDEX: 29.54 KG/M2 | SYSTOLIC BLOOD PRESSURE: 106 MMHG | DIASTOLIC BLOOD PRESSURE: 64 MMHG | WEIGHT: 188.6 LBS

## 2025-06-05 DIAGNOSIS — F19.10 HIGH RISK PREGNANCY DUE TO MATERNAL DRUG ABUSE, ANTEPARTUM: Primary | ICD-10-CM

## 2025-06-05 DIAGNOSIS — F11.91 OPIOID USE DISORDER IN REMISSION: ICD-10-CM

## 2025-06-05 DIAGNOSIS — M25.532 BILATERAL WRIST PAIN: ICD-10-CM

## 2025-06-05 DIAGNOSIS — G56.00 CARPAL TUNNEL SYNDROME DURING PREGNANCY (HHS-HCC): ICD-10-CM

## 2025-06-05 DIAGNOSIS — Z98.890 HISTORY OF LOOP ELECTROSURGICAL EXCISION PROCEDURE (LEEP) OF CERVIX: ICD-10-CM

## 2025-06-05 DIAGNOSIS — F41.9 ANXIETY AND DEPRESSION: ICD-10-CM

## 2025-06-05 DIAGNOSIS — Z3A.20 20 WEEKS GESTATION OF PREGNANCY (HHS-HCC): ICD-10-CM

## 2025-06-05 DIAGNOSIS — F32.A ANXIETY AND DEPRESSION: ICD-10-CM

## 2025-06-05 DIAGNOSIS — O99.320 HIGH RISK PREGNANCY DUE TO MATERNAL DRUG ABUSE, ANTEPARTUM: Primary | ICD-10-CM

## 2025-06-05 DIAGNOSIS — M25.531 BILATERAL WRIST PAIN: ICD-10-CM

## 2025-06-05 DIAGNOSIS — O26.899 CARPAL TUNNEL SYNDROME DURING PREGNANCY (HHS-HCC): ICD-10-CM

## 2025-06-05 LAB
POC APPEARANCE, URINE: CLEAR
POC BILIRUBIN, URINE: NEGATIVE
POC BLOOD, URINE: NEGATIVE
POC COLOR, URINE: YELLOW
POC GLUCOSE, URINE: NEGATIVE MG/DL
POC KETONES, URINE: NEGATIVE MG/DL
POC LEUKOCYTES, URINE: NEGATIVE
POC NITRITE,URINE: NEGATIVE
POC PH, URINE: 6 PH
POC PROTEIN, URINE: NEGATIVE MG/DL
POC SPECIFIC GRAVITY, URINE: 1.02
POC UROBILINOGEN, URINE: 0.2 EU/DL

## 2025-06-05 PROCEDURE — 99213 OFFICE O/P EST LOW 20 MIN: CPT | Mod: 25,TH | Performed by: OBSTETRICS & GYNECOLOGY

## 2025-06-05 PROCEDURE — 99213 OFFICE O/P EST LOW 20 MIN: CPT | Performed by: OBSTETRICS & GYNECOLOGY

## 2025-06-05 PROCEDURE — 81003 URINALYSIS AUTO W/O SCOPE: CPT | Performed by: OBSTETRICS & GYNECOLOGY

## 2025-06-05 RX ORDER — ARM BRACE
2 EACH MISCELLANEOUS 2 TIMES DAILY PRN
Qty: 2 EACH | Refills: 0 | Status: SHIPPED | OUTPATIENT
Start: 2025-06-05 | End: 2025-06-05

## 2025-06-05 RX ORDER — BUPRENORPHINE HYDROCHLORIDE 8 MG/1
8 TABLET SUBLINGUAL ONCE
COMMUNITY
Start: 2025-06-05

## 2025-06-05 RX ORDER — ARM BRACE
2 EACH MISCELLANEOUS 2 TIMES DAILY PRN
Qty: 2 EACH | Refills: 0 | Status: SHIPPED | OUTPATIENT
Start: 2025-06-05

## 2025-06-05 ASSESSMENT — ENCOUNTER SYMPTOMS
LOSS OF SENSATION IN FEET: 0
OCCASIONAL FEELINGS OF UNSTEADINESS: 0
DEPRESSION: 0

## 2025-06-05 ASSESSMENT — LIFESTYLE VARIABLES
HOW OFTEN DO YOU HAVE SIX OR MORE DRINKS ON ONE OCCASION: NEVER
HOW OFTEN DO YOU HAVE A DRINK CONTAINING ALCOHOL: NEVER
SKIP TO QUESTIONS 9-10: 1
AUDIT-C TOTAL SCORE: 0
HOW MANY STANDARD DRINKS CONTAINING ALCOHOL DO YOU HAVE ON A TYPICAL DAY: PATIENT DOES NOT DRINK

## 2025-06-05 ASSESSMENT — PAIN SCALES - GENERAL: PAINLEVEL_OUTOF10: 0 - NO PAIN

## 2025-06-05 ASSESSMENT — PAIN - FUNCTIONAL ASSESSMENT: PAIN_FUNCTIONAL_ASSESSMENT: 0-10

## 2025-06-05 NOTE — PROGRESS NOTES
"Subjective   Patient ID [id number]   Lizet Storm is a 29 y.o.  at 20w6d with a working estimated date of delivery of 10/17/2025, by Last Menstrual Period who presents for a routine prenatal visit. She denies vaginal bleeding, leakage of fluid, lack of FM, or pelvic pain.  C/o B/L ankle edema. C/o B/L CTS.   Works in a [employer].  Admits to a relapse of substance use in May. She was hospitalized for 3 days at [facility]    Her pregnancy is complicated by:  Substance abuse  Mood disorder      Objective   Physical Exam  Weight: 85.5 kg (188 lb 9.6 oz)  Expected Total Weight Gain: 7 kg (15 lb)-11.5 kg (25 lb)   Pregravid BMI: 28.19  BP: 106/64  Fetal Heart Rate: 135      Prenatal Labs  Urine dip:  Lab Results   Component Value Date    KETONESU NEGATIVE 2025       Lab Results   Component Value Date    HGB 11.1 (L) 2025    HCT 32.4 (L) 2025    ABO O 2025    HEPBSAG Nonreactive 2025     No results found for: \"PAPPA\", \"AFP\", \"HCG\", \"ESTRIOL\", \"INHBA\"  No results found for: \"GLUF\", \"GLUT1\", \"NQZCQJZ3AP\", \"UJKMECD4CW\"    Imaging  The most recent ultrasound was performed  and normal anatomy    Assessment/Plan   Diagnoses and all orders for this visit:  High risk pregnancy due to maternal drug abuse, antepartum  -     POCT UA Automated manually resulted  Opioid use disorder in remission  Anxiety and depression  History of loop electrosurgical excision procedure (LEEP) of cervix  20 weeks gestation of pregnancy ([facility])  -     POCT UA Automated manually resulted  Carpal tunnel syndrome during pregnancy ([facility])  -     arm brace (Wrist Brace Medium) misc; 2 each 2 times a day as needed (Wrist pain). Right and Left:  Apply 1 to each wrist and lower arm for symptoms of carpal tunnel.  Bilateral wrist pain  -     arm brace (Wrist Brace Medium) misc; 2 each 2 times a day as needed (Wrist pain). Right and Left:  Apply 1 to each wrist and lower arm for symptoms of carpal tunnel.  In treatment " program. Recommended therapy dog for mood support. She states she is struggling with staying sober.  Continue prenatal vitamin.  Labs reviewed.  Rhogam Not indicated  Recommended brace for wrists and compression socks.  Follow up in 4 weeks for a routine prenatal visit.

## 2025-06-09 ENCOUNTER — TELEPHONE (OUTPATIENT)
Dept: OBSTETRICS AND GYNECOLOGY | Facility: CLINIC | Age: 30
End: 2025-06-09
Payer: COMMERCIAL

## 2025-06-09 DIAGNOSIS — O09.90 HIGH RISK PREGNANCY, ANTEPARTUM (HHS-HCC): Primary | ICD-10-CM

## 2025-06-09 RX ORDER — PNV NO.52/IRON/FA/OMEGA-3/DHA 29-1-200MG
1 COMBINATION PACKAGE (EA) ORAL DAILY
Qty: 30 EACH | Refills: 11 | Status: SHIPPED | OUTPATIENT
Start: 2025-06-09 | End: 2025-07-09

## 2025-06-10 ENCOUNTER — TELEPHONE (OUTPATIENT)
Dept: OBSTETRICS AND GYNECOLOGY | Facility: CLINIC | Age: 30
End: 2025-06-10
Payer: COMMERCIAL

## 2025-06-10 DIAGNOSIS — O21.9 NAUSEA AND VOMITING IN PREGNANCY: ICD-10-CM

## 2025-06-10 DIAGNOSIS — O21.9 NAUSEA AND VOMITING DURING PREGNANCY: ICD-10-CM

## 2025-06-10 RX ORDER — ONDANSETRON 4 MG/1
TABLET, ORALLY DISINTEGRATING ORAL
Qty: 40 TABLET | Refills: 5 | Status: SHIPPED | OUTPATIENT
Start: 2025-06-10

## 2025-06-10 RX ORDER — METOCLOPRAMIDE 10 MG/1
TABLET ORAL
Qty: 40 TABLET | Refills: 5 | Status: SHIPPED | OUTPATIENT
Start: 2025-06-10

## 2025-06-10 NOTE — TELEPHONE ENCOUNTER
OB 21w4d last seen 6.5.25     Our office received fax from [facility] request rx Ondansetron 4mg and Metoclopram 10 mg    Request sent to Provider

## 2025-06-30 ENCOUNTER — HOSPITAL ENCOUNTER (OUTPATIENT)
Dept: RADIOLOGY | Facility: CLINIC | Age: 30
Discharge: HOME | End: 2025-06-30
Payer: COMMERCIAL

## 2025-06-30 DIAGNOSIS — N83.201 RIGHT OVARIAN CYST: ICD-10-CM

## 2025-06-30 DIAGNOSIS — F19.10 HIGH RISK PREGNANCY DUE TO MATERNAL DRUG ABUSE, ANTEPARTUM: ICD-10-CM

## 2025-06-30 DIAGNOSIS — O99.320 HIGH RISK PREGNANCY DUE TO MATERNAL DRUG ABUSE, ANTEPARTUM: ICD-10-CM

## 2025-06-30 DIAGNOSIS — O21.9 NAUSEA AND VOMITING IN PREGNANCY: ICD-10-CM

## 2025-06-30 PROCEDURE — 76816 OB US FOLLOW-UP PER FETUS: CPT

## 2025-06-30 PROCEDURE — 76816 OB US FOLLOW-UP PER FETUS: CPT | Performed by: STUDENT IN AN ORGANIZED HEALTH CARE EDUCATION/TRAINING PROGRAM

## 2025-06-30 PROCEDURE — 76820 UMBILICAL ARTERY ECHO: CPT

## 2025-06-30 PROCEDURE — 76820 UMBILICAL ARTERY ECHO: CPT | Performed by: STUDENT IN AN ORGANIZED HEALTH CARE EDUCATION/TRAINING PROGRAM

## 2025-07-02 ENCOUNTER — TELEPHONE (OUTPATIENT)
Dept: OBSTETRICS AND GYNECOLOGY | Facility: CLINIC | Age: 30
End: 2025-07-02
Payer: COMMERCIAL

## 2025-07-02 NOTE — TELEPHONE ENCOUNTER
Est prenatal pt 24.5 wks calling with questions regarding growth ultrasound. (Pt LVM) I attempted to call back, but no answer. I LMTCO.

## 2025-07-03 ENCOUNTER — ROUTINE PRENATAL (OUTPATIENT)
Dept: OBSTETRICS AND GYNECOLOGY | Facility: CLINIC | Age: 30
End: 2025-07-03
Payer: COMMERCIAL

## 2025-07-03 ENCOUNTER — HOSPITAL ENCOUNTER (OUTPATIENT)
Facility: HOSPITAL | Age: 30
End: 2025-07-03
Attending: OBSTETRICS & GYNECOLOGY | Admitting: OBSTETRICS & GYNECOLOGY
Payer: COMMERCIAL

## 2025-07-03 ENCOUNTER — HOSPITAL ENCOUNTER (OUTPATIENT)
Facility: HOSPITAL | Age: 30
Discharge: HOME | End: 2025-07-03
Attending: OBSTETRICS & GYNECOLOGY | Admitting: OBSTETRICS & GYNECOLOGY
Payer: COMMERCIAL

## 2025-07-03 VITALS
DIASTOLIC BLOOD PRESSURE: 60 MMHG | RESPIRATION RATE: 16 BRPM | TEMPERATURE: 98.1 F | OXYGEN SATURATION: 99 % | SYSTOLIC BLOOD PRESSURE: 108 MMHG | HEART RATE: 72 BPM | BODY MASS INDEX: 29.03 KG/M2 | HEIGHT: 67 IN | WEIGHT: 185 LBS

## 2025-07-03 VITALS — WEIGHT: 186 LBS | SYSTOLIC BLOOD PRESSURE: 104 MMHG | BODY MASS INDEX: 29.13 KG/M2 | DIASTOLIC BLOOD PRESSURE: 52 MMHG

## 2025-07-03 DIAGNOSIS — F19.10 HIGH RISK PREGNANCY DUE TO MATERNAL DRUG ABUSE, ANTEPARTUM: Primary | ICD-10-CM

## 2025-07-03 DIAGNOSIS — O99.320 HIGH RISK PREGNANCY DUE TO MATERNAL DRUG ABUSE, ANTEPARTUM: Primary | ICD-10-CM

## 2025-07-03 DIAGNOSIS — O36.5920 POOR FETAL GROWTH AFFECTING MANAGEMENT OF MOTHER IN SECOND TRIMESTER, SINGLE OR UNSPECIFIED FETUS (HHS-HCC): ICD-10-CM

## 2025-07-03 DIAGNOSIS — Z3A.24 24 WEEKS GESTATION OF PREGNANCY (HHS-HCC): ICD-10-CM

## 2025-07-03 PROCEDURE — 99213 OFFICE O/P EST LOW 20 MIN: CPT | Performed by: OBSTETRICS & GYNECOLOGY

## 2025-07-03 PROCEDURE — 99214 OFFICE O/P EST MOD 30 MIN: CPT | Performed by: STUDENT IN AN ORGANIZED HEALTH CARE EDUCATION/TRAINING PROGRAM

## 2025-07-03 PROCEDURE — 99212 OFFICE O/P EST SF 10 MIN: CPT

## 2025-07-03 PROCEDURE — 99214 OFFICE O/P EST MOD 30 MIN: CPT | Mod: TH | Performed by: STUDENT IN AN ORGANIZED HEALTH CARE EDUCATION/TRAINING PROGRAM

## 2025-07-03 PROCEDURE — 81003 URINALYSIS AUTO W/O SCOPE: CPT | Performed by: STUDENT IN AN ORGANIZED HEALTH CARE EDUCATION/TRAINING PROGRAM

## 2025-07-03 RX ORDER — HYDRALAZINE HYDROCHLORIDE 20 MG/ML
5 INJECTION INTRAMUSCULAR; INTRAVENOUS ONCE AS NEEDED
Status: DISCONTINUED | OUTPATIENT
Start: 2025-07-03 | End: 2025-07-03 | Stop reason: HOSPADM

## 2025-07-03 RX ORDER — ONDANSETRON 4 MG/1
4 TABLET, FILM COATED ORAL EVERY 6 HOURS PRN
Status: DISCONTINUED | OUTPATIENT
Start: 2025-07-03 | End: 2025-07-03 | Stop reason: HOSPADM

## 2025-07-03 RX ORDER — LIDOCAINE HYDROCHLORIDE 10 MG/ML
0.5 INJECTION, SOLUTION EPIDURAL; INFILTRATION; INTRACAUDAL; PERINEURAL ONCE AS NEEDED
Status: DISCONTINUED | OUTPATIENT
Start: 2025-07-03 | End: 2025-07-03 | Stop reason: HOSPADM

## 2025-07-03 RX ORDER — ONDANSETRON HYDROCHLORIDE 2 MG/ML
4 INJECTION, SOLUTION INTRAVENOUS EVERY 6 HOURS PRN
Status: DISCONTINUED | OUTPATIENT
Start: 2025-07-03 | End: 2025-07-03 | Stop reason: HOSPADM

## 2025-07-03 RX ORDER — LABETALOL HYDROCHLORIDE 5 MG/ML
20 INJECTION, SOLUTION INTRAVENOUS ONCE AS NEEDED
Status: DISCONTINUED | OUTPATIENT
Start: 2025-07-03 | End: 2025-07-03 | Stop reason: HOSPADM

## 2025-07-03 ASSESSMENT — ENCOUNTER SYMPTOMS
OCCASIONAL FEELINGS OF UNSTEADINESS: 0
DEPRESSION: 0
LOSS OF SENSATION IN FEET: 0

## 2025-07-03 ASSESSMENT — PAIN SCALES - GENERAL
PAINLEVEL_OUTOF10: 0 - NO PAIN
PAINLEVEL_OUTOF10: 0 - NO PAIN

## 2025-07-03 ASSESSMENT — PAIN - FUNCTIONAL ASSESSMENT: PAIN_FUNCTIONAL_ASSESSMENT: 0-10

## 2025-07-03 ASSESSMENT — PATIENT HEALTH QUESTIONNAIRE - PHQ9
1. LITTLE INTEREST OR PLEASURE IN DOING THINGS: NOT AT ALL
2. FEELING DOWN, DEPRESSED OR HOPELESS: NOT AT ALL
SUM OF ALL RESPONSES TO PHQ9 QUESTIONS 1 & 2: 0

## 2025-07-03 NOTE — H&P
OB Triage H&P    Assessment/Plan    Lizet Storm is a 29 y.o.  at 24w6d, BLAISE: 10/17/2025, by Last Menstrual Period, who presents to triage with variables on FHT.    Plan  Reassuring FHT  -Fetal monitoring reassuring  -Good fetal movement on US  -Up to date on prenatal care  -Continue routine prenatal care    Dispo  -Patient appropriate for discharge home, agrees with plan  -Return precautions discussed   -Follow up at next scheduled OB appointment or to triage sooner as needed    Discussed plan and reviewed with: N/A    Pregnancy Problems (from 25 to present)       Problem Noted Diagnosed Resolved    Nausea and vomiting in pregnancy 2025 by Shanell Rodney MD  No    Priority:  Medium               Subjective   Pt was seen in office today and was complaining of dec FM. Pt was placed on FHR monitor and noted to have some suspected variables but strip broken due to pt crying. Pt was recently told that her baby was IUGR and she is really worried about it. Pt admittedly has an anterior placenta and knows movement may not be predictable at this stage.  Denies vaginal bleeding., Denies contractions., Denies leaking of fluid.      Prenatal Provider Dr. Brizuela    OB History    Para Term  AB Living   1 0 0 0 0 0   SAB IAB Ectopic Multiple Live Births   0 0 0 0 0      # Outcome Date GA Lbr Odell/2nd Weight Sex Type Anes PTL Lv   1 Current                Surgical History[1]    Social History     Tobacco Use    Smoking status: Every Day     Current packs/day: 0.25     Average packs/day: 0.3 packs/day for 15.0 years (3.8 ttl pk-yrs)     Types: Cigarettes     Passive exposure: Never    Smokeless tobacco: Never    Tobacco comments:     Patient is a willing participant in Detox programs. Desires a family.    Substance Use Topics    Alcohol use: Not Currently     Comment: States used to be a really bad alcoholic       Allergies[2]    Prescriptions Prior to Admission[3]  Objective     Last  Vitals  Temp Pulse Resp BP MAP O2 Sat   36.7 °C (98.1 °F) 72 16 108/60 77 99 %     Blood Pressures         7/3/2025  1628             BP: 108/60             Physical Exam  General: NAD, mood appropriate  Cardiopulmonary: warm and well perfused, breathing comfortably on room air  Abdomen: Gravid, non-tender  Extremities: Symmetric  Speculum Exam: deferred  Cervix:   /  /      Chaperone Present: Yes.  Chaperone Name/Title: RN  Examination Chaperoned:      Fetal Monitoring  Baseline: 125 bpm, Variability: moderate,  Accelerations: present and Decelerations: none  Uterine Activity: No contractions seen on toco  Interpretation: Category I    Bedside ultrasound: Yes +FM noted, anterior placenta    Labs in chart were reviewed.          Prenatal labs reviewed, not remarkable.             [1]   Past Surgical History:  Procedure Laterality Date    CERVICAL BIOPSY  W/ LOOP ELECTRODE EXCISION      COSMETIC SURGERY  2022    BBL & lipo    TONSILLECTOMY     [2] No Known Allergies  [3]   Medications Prior to Admission   Medication Sig Dispense Refill Last Dose/Taking    buprenorphine (Subtex) 8 mg Place 1.5 tablets (12 mg) under the tongue once daily.   7/3/2025 Morning    cholecalciferol (Vitamin D-3) 25 MCG (1000 UT) capsule Take 1 capsule (25 mcg) by mouth once daily. 90 capsule 3 7/2/2025 Bedtime    citalopram (CeleXA) 40 mg tablet Take 1 tablet (40 mg) by mouth once daily.   7/2/2025 Bedtime    docusate sodium (Colace) 100 mg capsule Take 1 capsule (100 mg) by mouth once daily. 1 capsule by mouth missing once daily to soften stool 60 capsule 3 Past Week    arm brace (Wrist Brace Medium) misc 2 each 2 times a day as needed (Wrist pain). Right and Left:  Apply 1 to each wrist and lower arm for symptoms of carpal tunnel. 2 each 0     diphenhydrAMINE (BENADryl) 25 mg capsule Take 1 capsule (25 mg) by mouth every 6 hours. 360 capsule 0     famotidine (Pepcid) 20 mg tablet Take 1 tablet (20 mg) by mouth 2 times a day. 180 tablet 3      magnesium hydroxide (Milk of Magnesia) 400 mg/5 mL suspension Take 30 mL by mouth once daily as needed for constipation. 360 mL 3     metoclopramide (Reglan) 10 mg tablet Take 1 tablet (10 mg) by mouth every 8 hours if needed (Nausea/Vomiting). 30 tablet 0     metoclopramide (Reglan) 10 mg tablet Take 1 tablet every 6-8 hours to suppress nausea, vomiting, and/or acid reflux. 40 tablet 5     naloxone (Narcan) 4 mg/0.1 mL nasal spray Administer 1 spray (4 mg) into affected nostril(s) if needed for opioid reversal. May repeat every 2-3 minutes if needed, alternating nostrils, until medical assistance becomes available. 2 each 0     nicotine (Nicoderm CQ) 21 mg/24 hr patch Place 1 patch over 24 hours on the skin once daily. 28 patch 0     ondansetron ODT (Zofran-ODT) 4 mg disintegrating tablet Dissolve 1 tablet (4 mg) in the mouth every 6 hours as needed for nausea and vomiting 40 tablet 5     PNV cmb 52-iron-FA-omega-3-dha (Complete Bettina DHA) 29 mg iron- 1 mg-200 mg combo pack Take 1 Dose pack by mouth once daily. 30 each 11     scopolamine (Transderm-Scop) 1 mg over 3 days patch 3 day Place 1 patch over 72 hours on the skin every 3rd day. 10 patch 0

## 2025-07-03 NOTE — DISCHARGE SUMMARY
Discharge Summary    Admission Date: 7/3/2025  Discharge Date: 7/3/2025    Discharge Diagnosis  Decreased fetal movement  IUGR    Hospital Course  FHR monitoring  Bedside US    Pertinent Physical Exam At Time of Discharge  GENERAL: Examination reveals a well developed, well nourished, gravid female in no acute distress. She is alert and cooperative.      Last Vitals:  Temp Pulse Resp BP MAP Pulse Ox   36.7 °C (98.1 °F) 72 16 108/60 77 99 %     Discharge Meds     Your medication list        CONTINUE taking these medications        Instructions Last Dose Given Next Dose Due   Banophen 25 mg capsule  Generic drug: diphenhydrAMINE      Take 1 capsule (25 mg) by mouth every 6 hours.       buprenorphine 8 mg  Commonly known as: Subtex           cholecalciferol 25 mcg (1,000 units) capsule  Commonly known as: Vitamin D-3      Take 1 capsule (25 mcg) by mouth once daily.       citalopram 40 mg tablet  Commonly known as: CeleXA           Complete  DHA 29 mg iron- 1 mg-200 mg combo pack  Generic drug: PNV cmb 52-iron-FA-omega-3-dha      Take 1 Dose pack by mouth once daily.       docusate sodium 100 mg capsule  Commonly known as: Colace      Take 1 capsule (100 mg) by mouth once daily. 1 capsule by mouth missing once daily to soften stool       famotidine 20 mg tablet  Commonly known as: Pepcid      Take 1 tablet (20 mg) by mouth 2 times a day.       magnesium hydroxide 400 mg/5 mL suspension  Commonly known as: Milk of Magnesia      Take 30 mL by mouth once daily as needed for constipation.       metoclopramide 10 mg tablet  Commonly known as: Reglan      Take 1 tablet (10 mg) by mouth every 8 hours if needed (Nausea/Vomiting).       metoclopramide 10 mg tablet  Commonly known as: Reglan      Take 1 tablet every 6-8 hours to suppress nausea, vomiting, and/or acid reflux.       naloxone 4 mg/0.1 mL nasal spray  Commonly known as: Narcan      Administer 1 spray (4 mg) into affected nostril(s) if needed for opioid  reversal. May repeat every 2-3 minutes if needed, alternating nostrils, until medical assistance becomes available.       nicotine 21 mg/24 hr patch  Commonly known as: Nicoderm CQ      Place 1 patch over 24 hours on the skin once daily.       ondansetron ODT 4 mg disintegrating tablet  Commonly known as: Zofran-ODT      Dissolve 1 tablet (4 mg) in the mouth every 6 hours as needed for nausea and vomiting       scopolamine 1 mg over 3 days patch 3 day  Commonly known as: Transderm-Scop      Place 1 patch over 72 hours on the skin every 3rd day.       Wrist Brace Medium misc  Generic drug: arm brace      2 each 2 times a day as needed (Wrist pain). Right and Left:  Apply 1 to each wrist and lower arm for symptoms of carpal tunnel.                 Complications Requiring Follow-Up  Above    Test Results Pending At Discharge  Pending Labs       No current pending labs.            Outpatient Follow-Up  Future Appointments   Date Time Provider Department Center   7/7/2025  2:15 PM MAC [id number] OBGYNIMG ULTRASOUND 4 [id number] MAC [facility]   7/15/2025  3:15 PM MAC [id number] OBGYNIMG ULTRASOUND 1 [id number] MAC [facility] P   7/23/2025  2:45 PM MAC [id number] OBGYNIMG ULTRASOUND 4 [id number] MAC [facility]   7/31/2025  3:15 PM DO Monica Holder spent 25 minutes in the professional and overall care of this patient.      Samreen Wagner MD

## 2025-07-03 NOTE — PROGRESS NOTES
Subjective   Patient ID [id number]   Lizet Storm is a 29 y.o.  at 24w6d with a working estimated date of delivery of 10/17/2025, by Last Menstrual Period who presents for a routine prenatal visit.     She denies vaginal bleeding, leakage of fluid, or contractions. She endorses decreased fetal movement and does not think she has felt baby move today.    Her pregnancy is complicated by:  -Substance abuse  -Mood disorder  -IUGR    Objective   Physical Exam  Weight: 84.4 kg (186 lb)  Pregravid BMI: 28.19  BP: 104/52  Fetal Heart Rate: 125 Fundal Height (cm): 23 cm           Prenatal Labs  Urine dip:  Results for orders placed or performed in visit on 25   POCT UA Automated manually resulted    Collection Time: 25  2:33 PM   Result Value Ref Range    POC Color, Urine Yellow Straw, Yellow, Light-Yellow    POC Appearance, Urine Clear Clear    POC Glucose, Urine NEGATIVE NEGATIVE mg/dl    POC Bilirubin, Urine NEGATIVE NEGATIVE    POC Ketones, Urine NEGATIVE NEGATIVE mg/dl    POC Specific Gravity, Urine >=1.030 1.005 - 1.035    POC Blood, Urine NEGATIVE NEGATIVE    POC PH, Urine 5.5 No Reference Range Established PH    POC Protein, Urine NEGATIVE NEGATIVE mg/dl    POC Urobilinogen, Urine 0.2 0.2, 1.0 EU/DL    Poc Nitrite, Urine NEGATIVE NEGATIVE    POC Leukocytes, Urine NEGATIVE NEGATIVE     Assessment/Plan     29 y.o.  at 24w6d , RAMA  Assessment & Plan  High risk pregnancy due to maternal drug abuse, antepartum  -Continue prenatal vitamin  -Labs reviewed  -Discussed 1 hour GCT, CBC next visit   -NST done today due to her complaints of decreased movement in the setting of recent IUGR diagnosis, NST with variable decels and so she was sent to L&D for further evaluation/monitoring  -L&D staff updated and aware of the patient   -Return precautions reviewed thoroughly  -Follow up in 2 weeks for a routine prenatal visit  Orders:    CBC Anemia Panel With Reflex,Pregnancy; Future    Glucose, 1 Hour  Screen, Pregnancy; Future    Poor fetal growth affecting management of mother in second trimester, single or unspecified fetus ([facility])  -Reviewed findings of IUGR and recent US  -Discussed diagnosis and the need for routine  testing  -She voiced understanding and is in agreement with this plan of care        24 weeks gestation of pregnancy ([facility])    Orders:    POCT UA Automated manually resulted      Sree Hung MD

## 2025-07-03 NOTE — PROCEDURES
Lizet Storm, devonte  at 24w6d with an BLAISE of 10/17/2025, by Last Menstrual Period, was seen at [facility] for a nonstress test.    Non-Stress Test   Baseline Fetal Heart Rate for Non-Stress Test: 125 BPM  Variability in Waveform for Non-Stress Test: Moderate  Accelerations in Non-Stress Test: Yes  Decelerations in Non-Stress Test: Variable  Contractions in Non-Stress Test: Not present         NST with variable decels and so she was sent to L&D for further evaluation/monitoring. L&D staff updated and aware of the patient.    Sree Hung MD

## 2025-07-07 ENCOUNTER — HOSPITAL ENCOUNTER (OUTPATIENT)
Dept: RADIOLOGY | Facility: CLINIC | Age: 30
Discharge: HOME | End: 2025-07-07
Payer: COMMERCIAL

## 2025-07-07 DIAGNOSIS — Z3A.24 24 WEEKS GESTATION OF PREGNANCY (HHS-HCC): ICD-10-CM

## 2025-07-07 PROCEDURE — 76815 OB US LIMITED FETUS(S): CPT | Performed by: OBSTETRICS & GYNECOLOGY

## 2025-07-07 PROCEDURE — 76820 UMBILICAL ARTERY ECHO: CPT | Performed by: OBSTETRICS & GYNECOLOGY

## 2025-07-07 PROCEDURE — 76819 FETAL BIOPHYS PROFIL W/O NST: CPT | Performed by: OBSTETRICS & GYNECOLOGY

## 2025-07-07 PROCEDURE — 76815 OB US LIMITED FETUS(S): CPT

## 2025-07-07 PROCEDURE — 76820 UMBILICAL ARTERY ECHO: CPT

## 2025-07-09 ENCOUNTER — TELEPHONE (OUTPATIENT)
Dept: OBSTETRICS AND GYNECOLOGY | Facility: CLINIC | Age: 30
End: 2025-07-09
Payer: COMMERCIAL

## 2025-07-09 ENCOUNTER — HOSPITAL ENCOUNTER (OUTPATIENT)
Facility: HOSPITAL | Age: 30
Discharge: HOME | End: 2025-07-09
Attending: STUDENT IN AN ORGANIZED HEALTH CARE EDUCATION/TRAINING PROGRAM | Admitting: STUDENT IN AN ORGANIZED HEALTH CARE EDUCATION/TRAINING PROGRAM
Payer: COMMERCIAL

## 2025-07-09 ENCOUNTER — INITIAL PRENATAL (OUTPATIENT)
Dept: OBSTETRICS AND GYNECOLOGY | Facility: CLINIC | Age: 30
End: 2025-07-09
Payer: COMMERCIAL

## 2025-07-09 VITALS
WEIGHT: 189.4 LBS | RESPIRATION RATE: 17 BRPM | HEIGHT: 67 IN | BODY MASS INDEX: 29.73 KG/M2 | DIASTOLIC BLOOD PRESSURE: 68 MMHG | TEMPERATURE: 98.2 F | HEART RATE: 76 BPM | SYSTOLIC BLOOD PRESSURE: 113 MMHG | OXYGEN SATURATION: 99 %

## 2025-07-09 VITALS — BODY MASS INDEX: 29.66 KG/M2 | DIASTOLIC BLOOD PRESSURE: 72 MMHG | SYSTOLIC BLOOD PRESSURE: 130 MMHG | WEIGHT: 189.4 LBS

## 2025-07-09 DIAGNOSIS — O36.5920 POOR FETAL GROWTH AFFECTING MANAGEMENT OF MOTHER IN SECOND TRIMESTER, SINGLE OR UNSPECIFIED FETUS (HHS-HCC): ICD-10-CM

## 2025-07-09 DIAGNOSIS — O99.320 HIGH RISK PREGNANCY DUE TO MATERNAL DRUG ABUSE, ANTEPARTUM: Primary | ICD-10-CM

## 2025-07-09 DIAGNOSIS — Z3A.25 25 WEEKS GESTATION OF PREGNANCY (HHS-HCC): ICD-10-CM

## 2025-07-09 DIAGNOSIS — F19.10 HIGH RISK PREGNANCY DUE TO MATERNAL DRUG ABUSE, ANTEPARTUM: Primary | ICD-10-CM

## 2025-07-09 LAB
POC APPEARANCE, URINE: CLEAR
POC BILIRUBIN, URINE: NEGATIVE
POC BLOOD, URINE: NEGATIVE
POC COLOR, URINE: YELLOW
POC GLUCOSE, URINE: NEGATIVE MG/DL
POC KETONES, URINE: ABNORMAL MG/DL
POC LEUKOCYTES, URINE: NEGATIVE
POC NITRITE,URINE: NEGATIVE
POC PH, URINE: 7.5 PH
POC PROTEIN, URINE: NEGATIVE MG/DL
POC SPECIFIC GRAVITY, URINE: 1.02
POC UROBILINOGEN, URINE: 0.2 EU/DL

## 2025-07-09 PROCEDURE — 99212 OFFICE O/P EST SF 10 MIN: CPT

## 2025-07-09 PROCEDURE — 99213 OFFICE O/P EST LOW 20 MIN: CPT | Performed by: STUDENT IN AN ORGANIZED HEALTH CARE EDUCATION/TRAINING PROGRAM

## 2025-07-09 PROCEDURE — 81003 URINALYSIS AUTO W/O SCOPE: CPT | Mod: QW | Performed by: STUDENT IN AN ORGANIZED HEALTH CARE EDUCATION/TRAINING PROGRAM

## 2025-07-09 RX ORDER — LABETALOL HYDROCHLORIDE 5 MG/ML
20 INJECTION, SOLUTION INTRAVENOUS ONCE AS NEEDED
Status: DISCONTINUED | OUTPATIENT
Start: 2025-07-09 | End: 2025-07-09 | Stop reason: HOSPADM

## 2025-07-09 RX ORDER — ONDANSETRON 4 MG/1
4 TABLET, FILM COATED ORAL EVERY 6 HOURS PRN
Status: DISCONTINUED | OUTPATIENT
Start: 2025-07-09 | End: 2025-07-09 | Stop reason: HOSPADM

## 2025-07-09 RX ORDER — LIDOCAINE HYDROCHLORIDE 10 MG/ML
0.5 INJECTION, SOLUTION EPIDURAL; INFILTRATION; INTRACAUDAL; PERINEURAL ONCE AS NEEDED
Status: DISCONTINUED | OUTPATIENT
Start: 2025-07-09 | End: 2025-07-09 | Stop reason: HOSPADM

## 2025-07-09 RX ORDER — ONDANSETRON HYDROCHLORIDE 2 MG/ML
4 INJECTION, SOLUTION INTRAVENOUS EVERY 6 HOURS PRN
Status: DISCONTINUED | OUTPATIENT
Start: 2025-07-09 | End: 2025-07-09 | Stop reason: HOSPADM

## 2025-07-09 RX ORDER — HYDRALAZINE HYDROCHLORIDE 20 MG/ML
5 INJECTION INTRAMUSCULAR; INTRAVENOUS ONCE AS NEEDED
Status: DISCONTINUED | OUTPATIENT
Start: 2025-07-09 | End: 2025-07-09 | Stop reason: HOSPADM

## 2025-07-09 ASSESSMENT — ENCOUNTER SYMPTOMS
DEPRESSION: 0
OCCASIONAL FEELINGS OF UNSTEADINESS: 0
LOSS OF SENSATION IN FEET: 0

## 2025-07-09 ASSESSMENT — PAIN SCALES - GENERAL
PAINLEVEL_OUTOF10: 0 - NO PAIN

## 2025-07-09 ASSESSMENT — PATIENT HEALTH QUESTIONNAIRE - PHQ9
2. FEELING DOWN, DEPRESSED OR HOPELESS: NOT AT ALL
1. LITTLE INTEREST OR PLEASURE IN DOING THINGS: NOT AT ALL
SUM OF ALL RESPONSES TO PHQ9 QUESTIONS 1 & 2: 0

## 2025-07-09 ASSESSMENT — PAIN - FUNCTIONAL ASSESSMENT: PAIN_FUNCTIONAL_ASSESSMENT: 0-10

## 2025-07-09 NOTE — PROCEDURES
Lizet Storm, a  at 25w5d with an BLAISE of 10/17/2025, by Last Menstrual Period, was seen at [facility] for a nonstress test.    Non-Stress Test   Baseline Fetal Heart Rate for Non-Stress Test: 130 BPM  Variability in Waveform for Non-Stress Test: Moderate  Accelerations in Non-Stress Test: Yes  Decelerations in Non-Stress Test: Variable  Contractions in Non-Stress Test: Not present  Interpretation of Non-Stress Test     Comments on Non-Stress Test: Accelerations present but due to variable decelerations; patient sent to L&D for further monitoring. L&D staff was updated.

## 2025-07-09 NOTE — H&P
Delayed note due to patient care   OB Triage H&P    Assessment/Plan    Lizet Storm is a 29 y.o.  at 25w5d, BLAISE: 10/17/2025, by Last Menstrual Period, who presents to triage with decreased fetal movement and variable decelerations on office NST.    Reassuring fetal status on EFM, appropriate for gestational age. Active fetus on BSUS with BPP 8/8. Patient reassured and deemed stable for discharge.     Dispo  -Patient appropriate for discharge home, agrees with plan  -Return precautions discussed   -Follow up at next scheduled OB appointment or to triage sooner as needed      Subjective   28yo  at 25w5d presents from office for variable deceleration on NST. Patient presented to office for DFM, NST was performed, moderate variability and accelerations were present but variable deceleration was also noted. Patient feeling fetal movement since monitors applied. Denies ctx/LOF/VB.     Of note, pregnancy is complicated by FGR. Patient had BPP 8/8 and normal UA dopplers on 25 w/ MFM.     Prenatal Provider: Dr Brizuela    OB History    Para Term  AB Living   1 0 0 0 0 0   SAB IAB Ectopic Multiple Live Births   0 0 0 0 0      # Outcome Date GA Lbr Odell/2nd Weight Sex Type Anes PTL Lv   1 Current                Surgical History[1]    Social History     Tobacco Use    Smoking status: Every Day     Current packs/day: 0.25     Average packs/day: 0.3 packs/day for 15.0 years (3.8 ttl pk-yrs)     Types: Cigarettes     Passive exposure: Never    Smokeless tobacco: Never    Tobacco comments:     Patient is a willing participant in Detox programs. Desires a family.    Substance Use Topics    Alcohol use: Not Currently     Comment: States used to be a really bad alcoholic       Allergies[2]    Prescriptions Prior to Admission[3]  Objective     Last Vitals  Temp Pulse Resp BP MAP O2 Sat   36.8 °C (98.2 °F) 76 17 113/68 86 99 %     Blood Pressures         2025  1636 2025  1637 2025  1640        BP: 113/68 113/68 113/68              Physical Exam  General: NAD, mood appropriate  Cardiopulmonary: warm and well perfused, breathing comfortably on room air  Abdomen: Gravid, non-tender  Extremities: Symmetric  Speculum Exam: deferred  Cervix: deferred    Chaperone Present: Yes.  Chaperone Name/Title: RN  Examination Chaperoned: Entire Physical Exam     Fetal Monitoring  Baseline: 140 bpm, Variability: moderate,  Accelerations: present and Decelerations: none  Uterine Activity: No contractions seen on toco  Interpretation: Category 1    Bedside ultrasound: active fetus in breech presentation, BPP 8/8, SHIRA 14.6cm               [1]   Past Surgical History:  Procedure Laterality Date    CERVICAL BIOPSY  W/ LOOP ELECTRODE EXCISION      COSMETIC SURGERY  2022    BBL & lipo    TONSILLECTOMY     [2] No Known Allergies  [3]   No medications prior to admission.

## 2025-07-09 NOTE — PROGRESS NOTES
Subjective   Patient ID [id number]   Lizet Storm is a 29 y.o.  at 25w5d with a working estimated date of delivery of 10/17/2025, by Last Menstrual Period who presents due to decreased fetal movement.    She denies vaginal bleeding, leakage of fluid, or contractions. She is feeling baby a lot move now but did endorse decreased fetal movement earlier today.     Her pregnancy is complicated by:  -Substance abuse  -Mood disorder  -IUGR    -25w3d- normal SHIRA, BPP 8/8, normal dopplers     Objective   Physical Exam  Weight: 85.9 kg (189 lb 6.4 oz)  Pregravid BMI: 28.19  BP: 130/72  Fetal Heart Rate: 130 Fundal Height (cm): 24 cm             Prenatal Labs  Urine dip:  Results for orders placed or performed in visit on 25   POCT UA Automated manually resulted    Collection Time: 25  3:36 PM   Result Value Ref Range    POC Color, Urine Yellow Straw, Yellow, Light-Yellow    POC Appearance, Urine Clear Clear    POC Glucose, Urine NEGATIVE NEGATIVE mg/dl    POC Bilirubin, Urine NEGATIVE NEGATIVE    POC Ketones, Urine TRACE (A) NEGATIVE mg/dl    POC Specific Gravity, Urine 1.020 1.005 - 1.035    POC Blood, Urine NEGATIVE NEGATIVE    POC PH, Urine 7.5 No Reference Range Established PH    POC Protein, Urine NEGATIVE NEGATIVE mg/dl    POC Urobilinogen, Urine 0.2 0.2, 1.0 EU/DL    Poc Nitrite, Urine NEGATIVE NEGATIVE    POC Leukocytes, Urine NEGATIVE NEGATIVE     Assessment/Plan     29 y.o.  at 25w5d , RAMA  Assessment & Plan  High risk pregnancy due to maternal drug abuse, antepartum  -Continue prenatal vitamin  -NST reactive today but with multiple variable decelerations; she was sent to L&D for further monitoring. L&D staff updated and aware of the patient.  -Return precautions reviewed thoroughly  -Follow up for  routine prenatal visit       Poor fetal growth affecting management of mother in second trimester, single or unspecified fetus ([facility])         25 weeks gestation of pregnancy  ([facility])    Orders:    POCT UA Automated manually resulted      Sree Hung MD

## 2025-07-09 NOTE — TELEPHONE ENCOUNTER
Est OB of Dr Brizuela's. Patient called stating that she has not felt fetal movement since last night. She stated she has tried cold, sugary drinks with no result and would like to be seen. Added to today.

## 2025-07-15 ENCOUNTER — HOSPITAL ENCOUNTER (OUTPATIENT)
Dept: RADIOLOGY | Facility: CLINIC | Age: 30
Discharge: HOME | End: 2025-07-15
Payer: COMMERCIAL

## 2025-07-15 DIAGNOSIS — Z3A.24 24 WEEKS GESTATION OF PREGNANCY (HHS-HCC): ICD-10-CM

## 2025-07-15 DIAGNOSIS — F19.10 HIGH RISK PREGNANCY DUE TO MATERNAL DRUG ABUSE, ANTEPARTUM: ICD-10-CM

## 2025-07-15 DIAGNOSIS — O36.5921 INTRAUTERINE GROWTH RESTRICTION AFFECTING ANTEPARTUM CARE OF MOTHER IN SECOND TRIMESTER, FETUS 1 (HHS-HCC): ICD-10-CM

## 2025-07-15 DIAGNOSIS — O99.320 HIGH RISK PREGNANCY DUE TO MATERNAL DRUG ABUSE, ANTEPARTUM: ICD-10-CM

## 2025-07-15 PROCEDURE — 76820 UMBILICAL ARTERY ECHO: CPT

## 2025-07-15 PROCEDURE — 76815 OB US LIMITED FETUS(S): CPT

## 2025-07-15 PROCEDURE — 76820 UMBILICAL ARTERY ECHO: CPT | Performed by: OBSTETRICS & GYNECOLOGY

## 2025-07-15 PROCEDURE — 76815 OB US LIMITED FETUS(S): CPT | Performed by: OBSTETRICS & GYNECOLOGY

## 2025-07-15 PROCEDURE — 76819 FETAL BIOPHYS PROFIL W/O NST: CPT | Performed by: OBSTETRICS & GYNECOLOGY

## 2025-07-19 ENCOUNTER — HOSPITAL ENCOUNTER (OUTPATIENT)
Facility: HOSPITAL | Age: 30
End: 2025-07-19
Attending: OBSTETRICS & GYNECOLOGY | Admitting: OBSTETRICS & GYNECOLOGY
Payer: COMMERCIAL

## 2025-07-19 ENCOUNTER — HOSPITAL ENCOUNTER (OUTPATIENT)
Facility: HOSPITAL | Age: 30
Discharge: HOME | End: 2025-07-19
Attending: OBSTETRICS & GYNECOLOGY | Admitting: OBSTETRICS & GYNECOLOGY
Payer: COMMERCIAL

## 2025-07-19 VITALS
HEART RATE: 95 BPM | DIASTOLIC BLOOD PRESSURE: 66 MMHG | SYSTOLIC BLOOD PRESSURE: 123 MMHG | TEMPERATURE: 98.8 F | RESPIRATION RATE: 17 BRPM | OXYGEN SATURATION: 99 %

## 2025-07-19 PROCEDURE — 59025 FETAL NON-STRESS TEST: CPT | Mod: 59 | Performed by: OBSTETRICS & GYNECOLOGY

## 2025-07-19 PROCEDURE — 76818 FETAL BIOPHYS PROFILE W/NST: CPT | Performed by: OBSTETRICS & GYNECOLOGY

## 2025-07-19 PROCEDURE — 99214 OFFICE O/P EST MOD 30 MIN: CPT | Performed by: OBSTETRICS & GYNECOLOGY

## 2025-07-19 PROCEDURE — 99212 OFFICE O/P EST SF 10 MIN: CPT | Mod: 25

## 2025-07-19 PROCEDURE — 59025 FETAL NON-STRESS TEST: CPT | Performed by: OBSTETRICS & GYNECOLOGY

## 2025-07-19 ASSESSMENT — PAIN SCALES - GENERAL
PAINLEVEL_OUTOF10: 0 - NO PAIN
PAINLEVEL_OUTOF10: 0 - NO PAIN

## 2025-07-19 NOTE — H&P
"  Lizet Storm is  29 y.o. female, , who is at 27w1d with an BLAISE of 10/17/2025, by Last Menstrual Period dating method.    She presented complaining of decrease fetal movement   No contractions.       Denies vaginal bleeding. No Rupture of membranes     No headache, no visual changes and No right upper abdominal pain      Obstetrical History:  OB History          1    Para   0    Term   0       0    AB   0    Living   0         SAB   0    IAB   0    Ectopic   0    Multiple   0    Live Births   0                       Medical History[1]  Surgical History[2]  Family History[3]  Social History[4]  Tobacco Use History[5]    Allergies  Patient has no known allergies.     Medications  Prescriptions Prior to Admission[6]        REVIEW OF SYSTEMS:    General: No weight loss, malaise or fevers or other constitutional symptoms.  Neuro: No history of TIA's, stroke,.  No neurological symptoms or problems. No visual changes  Respiratory: No history of respiratory/pulmonary symptoms or problems.  Cardiovascular: No history of cardiovascular symptoms or problems.  GI: No history of GI symptoms or problems.   : No history of UTI in past 6 weeks.  No history of  symptoms or problems.    PHYSICAL EXAMINATION:    Objective    Last Vitals  Temp Pulse Resp BP MAP O2 Sat   37.1 °C (98.8 °F) 95 17 123/66   99 %       GENERAL: pleasant, female in no apparent distress    ABDOMEN: soft, non-tender and no masses  PELVIC:  Cervix   NST : Cat 1 tracing , +ve acceleration, no deceleration   BPP: 8/8  Fluid 4x6 cm pocked         Lab Review  No results found for: \"HGB\"  No results found for: \"WBC\"  Platelets   Date Value Ref Range Status   2025 178 150 - 450 x10*3/uL Final         Assessment and Plan  Lizet Storm is  29 y.o. female,  who is at 27w1d presented with decrease FM ,   NST cat 1 tracing   BPP 8/8  Patient seen and felt movement while monitoring     Plan:      Discharge home  Discussed reasons to " return if PIH symptoms, contraction, bleeding, or ROM   FU in office        Nicolette Ramey MD         [1]   Past Medical History:  Diagnosis Date    Abnormal Pap smear of cervix     ADD (attention deficit disorder)     on Adderall ER 15mg QD + Adderall IR 10mg QPM    Alcohol abuse 06/08/2021    Cervical dysplasia     history of LEEP in 20s. Neg cotest Feb 2024.    Depression     on Celexa, doxepin, trazodone    Female infertility     History of dehydration     3/2025    HPV (human papilloma virus) infection     Polysubstance use disorder     Cocaine (last use 2023), Alcohol (last use 2023), kratom (last use Jan 2025) THC (last use Feb 2025)    Recovering alcoholic in remission (Multi)    [2]   Past Surgical History:  Procedure Laterality Date    CERVICAL BIOPSY  W/ LOOP ELECTRODE EXCISION      COSMETIC SURGERY  2022    BBL & lipo    TONSILLECTOMY     [3]   Family History  Problem Relation Name Age of Onset    Accidental death Father Ino     Alcohol abuse Father Ino     Depression Father Ino     Drug abuse Father Ino     Mental illness Father Ino    [4]   Social History  Tobacco Use    Smoking status: Every Day     Current packs/day: 0.25     Average packs/day: 0.3 packs/day for 15.0 years (3.8 ttl pk-yrs)     Types: Cigarettes     Passive exposure: Never    Smokeless tobacco: Never    Tobacco comments:     Patient is a willing participant in Detox programs. Desires a family.    Vaping Use    Vaping status: Every Day    Substances: Nicotine, THC, CBD, Flavoring    Devices: Disposable, Pre-filled or refillable cartridge   Substance Use Topics    Alcohol use: Not Currently     Comment: States used to be a really bad alcoholic    Drug use: Yes     Types: Marijuana     Comment: THC (Heavy), Kratum (heavy-detox at [facility]). Hx of Heroin abuse - recovered.   [5]   Social History  Tobacco Use   Smoking Status Every Day    Current packs/day: 0.25    Average packs/day: 0.3 packs/day for 15.0 years (3.8 ttl pk-yrs)     Types: Cigarettes    Passive exposure: Never   Smokeless Tobacco Never   Tobacco Comments    Patient is a willing participant in Detox programs. Desires a family.    [6]   Medications Prior to Admission   Medication Sig Dispense Refill Last Dose/Taking    arm brace (Wrist Brace Medium) misc 2 each 2 times a day as needed (Wrist pain). Right and Left:  Apply 1 to each wrist and lower arm for symptoms of carpal tunnel. 2 each 0 Past Week    buprenorphine (Subtex) 8 mg Place 1.5 tablets (12 mg) under the tongue once daily.   2025    cholecalciferol (Vitamin D-3) 25 MCG (1000 UT) capsule Take 1 capsule (25 mcg) by mouth once daily. 90 capsule 3 Past Week    citalopram (CeleXA) 40 mg tablet Take 1 tablet (40 mg) by mouth once daily.   Past Week    magnesium hydroxide (Milk of Magnesia) 400 mg/5 mL suspension Take 30 mL by mouth once daily as needed for constipation. 360 mL 3 More than a month    metoclopramide (Reglan) 10 mg tablet Take 1 tablet (10 mg) by mouth every 8 hours if needed (Nausea/Vomiting). 30 tablet 0 More than a month    metoclopramide (Reglan) 10 mg tablet Take 1 tablet every 6-8 hours to suppress nausea, vomiting, and/or acid reflux. 40 tablet 5 More than a month    ondansetron ODT (Zofran-ODT) 4 mg disintegrating tablet Dissolve 1 tablet (4 mg) in the mouth every 6 hours as needed for nausea and vomiting 40 tablet 5 More than a month    PNV cmb 52-iron-FA-omega-3-dha (Complete Bettina DHA) 29 mg iron- 1 mg-200 mg combo pack Take 1 Dose pack by mouth once daily. 30 each 11 2025    diphenhydrAMINE (BENADryl) 25 mg capsule Take 1 capsule (25 mg) by mouth every 6 hours. 360 capsule 0     famotidine (Pepcid) 20 mg tablet Take 1 tablet (20 mg) by mouth 2 times a day. 180 tablet 3     naloxone (Narcan) 4 mg/0.1 mL nasal spray Administer 1 spray (4 mg) into affected nostril(s) if needed for opioid reversal. May repeat every 2-3 minutes if needed, alternating nostrils, until medical assistance  becomes available. 2 each 0 Unknown    nicotine (Nicoderm CQ) 21 mg/24 hr patch Place 1 patch over 24 hours on the skin once daily. 28 patch 0     scopolamine (Transderm-Scop) 1 mg over 3 days patch 3 day Place 1 patch over 72 hours on the skin every 3rd day. 10 patch 0 Unknown

## 2025-07-23 ENCOUNTER — HOSPITAL ENCOUNTER (OUTPATIENT)
Dept: RADIOLOGY | Facility: CLINIC | Age: 30
Discharge: HOME | End: 2025-07-23
Payer: COMMERCIAL

## 2025-07-23 DIAGNOSIS — N83.201 RIGHT OVARIAN CYST: ICD-10-CM

## 2025-07-23 DIAGNOSIS — O21.9 NAUSEA AND VOMITING IN PREGNANCY: ICD-10-CM

## 2025-07-23 DIAGNOSIS — F19.10 HIGH RISK PREGNANCY DUE TO MATERNAL DRUG ABUSE, ANTEPARTUM: ICD-10-CM

## 2025-07-23 DIAGNOSIS — O99.320 HIGH RISK PREGNANCY DUE TO MATERNAL DRUG ABUSE, ANTEPARTUM: ICD-10-CM

## 2025-07-23 PROCEDURE — 76820 UMBILICAL ARTERY ECHO: CPT | Performed by: STUDENT IN AN ORGANIZED HEALTH CARE EDUCATION/TRAINING PROGRAM

## 2025-07-23 PROCEDURE — 76819 FETAL BIOPHYS PROFIL W/O NST: CPT | Performed by: STUDENT IN AN ORGANIZED HEALTH CARE EDUCATION/TRAINING PROGRAM

## 2025-07-23 PROCEDURE — 76820 UMBILICAL ARTERY ECHO: CPT

## 2025-07-23 PROCEDURE — 76816 OB US FOLLOW-UP PER FETUS: CPT

## 2025-07-23 PROCEDURE — 76816 OB US FOLLOW-UP PER FETUS: CPT | Performed by: STUDENT IN AN ORGANIZED HEALTH CARE EDUCATION/TRAINING PROGRAM

## 2025-07-30 ENCOUNTER — HOSPITAL ENCOUNTER (OUTPATIENT)
Dept: RADIOLOGY | Facility: CLINIC | Age: 30
Discharge: HOME | End: 2025-07-30
Payer: COMMERCIAL

## 2025-07-30 DIAGNOSIS — F19.10 HIGH RISK PREGNANCY DUE TO MATERNAL DRUG ABUSE, ANTEPARTUM: ICD-10-CM

## 2025-07-30 DIAGNOSIS — O99.320 HIGH RISK PREGNANCY DUE TO MATERNAL DRUG ABUSE, ANTEPARTUM: ICD-10-CM

## 2025-07-30 DIAGNOSIS — O21.9 NAUSEA AND VOMITING IN PREGNANCY: ICD-10-CM

## 2025-07-30 DIAGNOSIS — N83.201 RIGHT OVARIAN CYST: ICD-10-CM

## 2025-07-30 PROCEDURE — 76815 OB US LIMITED FETUS(S): CPT

## 2025-07-30 PROCEDURE — 76819 FETAL BIOPHYS PROFIL W/O NST: CPT | Performed by: OBSTETRICS & GYNECOLOGY

## 2025-07-30 PROCEDURE — 76820 UMBILICAL ARTERY ECHO: CPT

## 2025-07-30 PROCEDURE — 76815 OB US LIMITED FETUS(S): CPT | Performed by: OBSTETRICS & GYNECOLOGY

## 2025-07-30 PROCEDURE — 76820 UMBILICAL ARTERY ECHO: CPT | Performed by: OBSTETRICS & GYNECOLOGY

## 2025-07-31 ENCOUNTER — ROUTINE PRENATAL (OUTPATIENT)
Dept: OBSTETRICS AND GYNECOLOGY | Facility: CLINIC | Age: 30
End: 2025-07-31
Payer: COMMERCIAL

## 2025-07-31 VITALS — DIASTOLIC BLOOD PRESSURE: 66 MMHG | SYSTOLIC BLOOD PRESSURE: 99 MMHG | BODY MASS INDEX: 30.79 KG/M2 | WEIGHT: 196.6 LBS

## 2025-07-31 DIAGNOSIS — F41.9 ANXIETY IN PREGNANCY, ANTEPARTUM: ICD-10-CM

## 2025-07-31 DIAGNOSIS — O99.320 HIGH RISK PREGNANCY DUE TO MATERNAL DRUG ABUSE, ANTEPARTUM: Primary | ICD-10-CM

## 2025-07-31 DIAGNOSIS — Z3A.28 28 WEEKS GESTATION OF PREGNANCY (HHS-HCC): ICD-10-CM

## 2025-07-31 DIAGNOSIS — F19.10 HIGH RISK PREGNANCY DUE TO MATERNAL DRUG ABUSE, ANTEPARTUM: Primary | ICD-10-CM

## 2025-07-31 DIAGNOSIS — O36.5920 POOR FETAL GROWTH AFFECTING MANAGEMENT OF MOTHER IN SECOND TRIMESTER, SINGLE OR UNSPECIFIED FETUS (HHS-HCC): ICD-10-CM

## 2025-07-31 DIAGNOSIS — O99.340 ANXIETY IN PREGNANCY, ANTEPARTUM: ICD-10-CM

## 2025-07-31 DIAGNOSIS — O34.40 HISTORY OF LOOP ELECTROSURGICAL EXCISION PROCEDURE (LEEP) OF CERVIX AFFECTING PREGNANCY, ANTEPARTUM: ICD-10-CM

## 2025-07-31 DIAGNOSIS — Z98.890 HISTORY OF LOOP ELECTROSURGICAL EXCISION PROCEDURE (LEEP) OF CERVIX AFFECTING PREGNANCY, ANTEPARTUM: ICD-10-CM

## 2025-07-31 PROCEDURE — 99213 OFFICE O/P EST LOW 20 MIN: CPT | Performed by: OBSTETRICS & GYNECOLOGY

## 2025-07-31 PROCEDURE — 99213 OFFICE O/P EST LOW 20 MIN: CPT | Mod: TH,25 | Performed by: OBSTETRICS & GYNECOLOGY

## 2025-07-31 ASSESSMENT — ENCOUNTER SYMPTOMS
OCCASIONAL FEELINGS OF UNSTEADINESS: 0
DEPRESSION: 0
LOSS OF SENSATION IN FEET: 0

## 2025-07-31 ASSESSMENT — LIFESTYLE VARIABLES
HOW OFTEN DO YOU HAVE A DRINK CONTAINING ALCOHOL: NEVER
HOW OFTEN DO YOU HAVE SIX OR MORE DRINKS ON ONE OCCASION: NEVER
HOW MANY STANDARD DRINKS CONTAINING ALCOHOL DO YOU HAVE ON A TYPICAL DAY: PATIENT DOES NOT DRINK
SKIP TO QUESTIONS 9-10: 1
AUDIT-C TOTAL SCORE: 0

## 2025-07-31 ASSESSMENT — SOCIAL DETERMINANTS OF HEALTH (SDOH)
WITHIN THE LAST YEAR, HAVE TO BEEN RAPED OR FORCED TO HAVE ANY KIND OF SEXUAL ACTIVITY BY YOUR PARTNER OR EX-PARTNER?: NO
WITHIN THE LAST YEAR, HAVE YOU BEEN KICKED, HIT, SLAPPED, OR OTHERWISE PHYSICALLY HURT BY YOUR PARTNER OR EX-PARTNER?: NO
WITHIN THE LAST YEAR, HAVE YOU BEEN AFRAID OF YOUR PARTNER OR EX-PARTNER?: NO
WITHIN THE LAST YEAR, HAVE YOU BEEN HUMILIATED OR EMOTIONALLY ABUSED IN OTHER WAYS BY YOUR PARTNER OR EX-PARTNER?: NO

## 2025-07-31 ASSESSMENT — PAIN SCALES - GENERAL: PAINLEVEL_OUTOF10: 0 - NO PAIN

## 2025-07-31 ASSESSMENT — PAIN - FUNCTIONAL ASSESSMENT: PAIN_FUNCTIONAL_ASSESSMENT: 0-10

## 2025-07-31 NOTE — PROGRESS NOTES
"Subjective   Patient ID [id number]   Lizet Stomr is a 29 y.o.  at 28w6d with a working estimated date of delivery of 10/17/2025, by Last Menstrual Period who presents for a routine prenatal visit. She denies vaginal bleeding, leakage of fluid, decreased fetal movements, or contractions.    Her pregnancy is complicated by:      Objective   Physical Exam:   Weight: 89.2 kg (196 lb 9.6 oz)  Expected Total Weight Gain: 7 kg (15 lb)-11.5 kg (25 lb)   Pregravid BMI: 28.19  BP: 99/66  Fetal Heart Rate: 125 Fundal Height (cm): 29 cm             Prenatal Labs  Urine Dip:  Lab Results   Component Value Date    KETONESU TRACE (A) 2025     Lab Results   Component Value Date    HGB 11.1 (L) 2025    HCT 32.4 (L) 2025    ABO O 2025    HEPBSAG Nonreactive 2025     No results found for: \"PAPPA\", \"AFP\", \"HCG\", \"ESTRIOL\", \"INHBA\"  No results found for: \"GLUF\", \"GLUT1\", \"JZAOCTU1SW\", \"KWOGQQB4RU\"         Assessment/Plan   Diagnoses and all orders for this visit:  High risk pregnancy due to maternal drug abuse, antepartum  -     Glucose, 1 Hour Screen, Pregnancy; Future  -     CBC; Future  Poor fetal growth affecting management of mother in second trimester, single or unspecified fetus ([facility])  -     Glucose, 1 Hour Screen, Pregnancy; Future  -     CBC; Future  Anxiety in pregnancy, antepartum  History of loop electrosurgical excision procedure (LEEP) of cervix affecting pregnancy, antepartum  28 weeks gestation of pregnancy ([facility])    Continue prenatal vitamin.  Labs reviewed.  GBS taken.  Expected mode of delivery   Follow up in 3 week for a routine prenatal visit.  "

## 2025-08-03 LAB
ERYTHROCYTE [DISTWIDTH] IN BLOOD BY AUTOMATED COUNT: 12.4 % (ref 11–15)
GLUCOSE 1H P 50 G GLC PO SERPL-MCNC: 164 MG/DL
HCT VFR BLD AUTO: 35.7 % (ref 35–45)
HGB BLD-MCNC: 12 G/DL (ref 11.7–15.5)
MCH RBC QN AUTO: 30.2 PG (ref 27–33)
MCHC RBC AUTO-ENTMCNC: 33.6 G/DL (ref 32–36)
MCV RBC AUTO: 89.7 FL (ref 80–100)
PLATELET # BLD AUTO: 161 THOUSAND/UL (ref 140–400)
PMV BLD REES-ECKER: 11 FL (ref 7.5–12.5)
RBC # BLD AUTO: 3.98 MILLION/UL (ref 3.8–5.1)
WBC # BLD AUTO: 7 THOUSAND/UL (ref 3.8–10.8)

## 2025-08-06 ENCOUNTER — RESULTS FOLLOW-UP (OUTPATIENT)
Dept: OBSTETRICS AND GYNECOLOGY | Facility: HOSPITAL | Age: 30
End: 2025-08-06
Payer: COMMERCIAL

## 2025-08-06 ENCOUNTER — HOSPITAL ENCOUNTER (OUTPATIENT)
Dept: RADIOLOGY | Facility: CLINIC | Age: 30
Discharge: HOME | End: 2025-08-06
Payer: COMMERCIAL

## 2025-08-06 DIAGNOSIS — Z3A.27 27 WEEKS GESTATION OF PREGNANCY (HHS-HCC): ICD-10-CM

## 2025-08-06 DIAGNOSIS — O99.810 PREGNANCY-INDUCED GLUCOSE INTOLERANCE (HHS-HCC): Primary | ICD-10-CM

## 2025-08-06 PROCEDURE — 76821 MIDDLE CEREBRAL ARTERY ECHO: CPT | Performed by: OBSTETRICS & GYNECOLOGY

## 2025-08-06 PROCEDURE — 76819 FETAL BIOPHYS PROFIL W/O NST: CPT | Performed by: OBSTETRICS & GYNECOLOGY

## 2025-08-06 PROCEDURE — 76820 UMBILICAL ARTERY ECHO: CPT

## 2025-08-06 PROCEDURE — 76815 OB US LIMITED FETUS(S): CPT

## 2025-08-06 PROCEDURE — 76820 UMBILICAL ARTERY ECHO: CPT | Performed by: OBSTETRICS & GYNECOLOGY

## 2025-08-06 PROCEDURE — 76815 OB US LIMITED FETUS(S): CPT | Performed by: OBSTETRICS & GYNECOLOGY

## 2025-08-13 ENCOUNTER — HOSPITAL ENCOUNTER (OUTPATIENT)
Dept: RADIOLOGY | Facility: CLINIC | Age: 30
Discharge: HOME | End: 2025-08-13
Payer: COMMERCIAL

## 2025-08-13 DIAGNOSIS — F19.10 HIGH RISK PREGNANCY DUE TO MATERNAL DRUG ABUSE, ANTEPARTUM: ICD-10-CM

## 2025-08-13 DIAGNOSIS — N83.201 RIGHT OVARIAN CYST: ICD-10-CM

## 2025-08-13 DIAGNOSIS — O99.320 HIGH RISK PREGNANCY DUE TO MATERNAL DRUG ABUSE, ANTEPARTUM: ICD-10-CM

## 2025-08-13 DIAGNOSIS — O21.9 NAUSEA AND VOMITING IN PREGNANCY: ICD-10-CM

## 2025-08-13 PROCEDURE — 76820 UMBILICAL ARTERY ECHO: CPT

## 2025-08-13 PROCEDURE — 76820 UMBILICAL ARTERY ECHO: CPT | Performed by: OBSTETRICS & GYNECOLOGY

## 2025-08-13 PROCEDURE — 76819 FETAL BIOPHYS PROFIL W/O NST: CPT | Performed by: OBSTETRICS & GYNECOLOGY

## 2025-08-13 PROCEDURE — 76816 OB US FOLLOW-UP PER FETUS: CPT | Performed by: OBSTETRICS & GYNECOLOGY

## 2025-08-13 PROCEDURE — 76816 OB US FOLLOW-UP PER FETUS: CPT

## 2025-08-14 ENCOUNTER — ROUTINE PRENATAL (OUTPATIENT)
Dept: OBSTETRICS AND GYNECOLOGY | Facility: CLINIC | Age: 30
End: 2025-08-14
Payer: COMMERCIAL

## 2025-08-14 VITALS — SYSTOLIC BLOOD PRESSURE: 96 MMHG | BODY MASS INDEX: 31.14 KG/M2 | DIASTOLIC BLOOD PRESSURE: 64 MMHG | WEIGHT: 198.8 LBS

## 2025-08-14 DIAGNOSIS — O36.5920 POOR FETAL GROWTH AFFECTING MANAGEMENT OF MOTHER IN SECOND TRIMESTER, SINGLE OR UNSPECIFIED FETUS (HHS-HCC): ICD-10-CM

## 2025-08-14 DIAGNOSIS — O34.40 HISTORY OF LOOP ELECTROSURGICAL EXCISION PROCEDURE (LEEP) OF CERVIX AFFECTING PREGNANCY, ANTEPARTUM: ICD-10-CM

## 2025-08-14 DIAGNOSIS — F19.10 HIGH RISK PREGNANCY DUE TO MATERNAL DRUG ABUSE, ANTEPARTUM: ICD-10-CM

## 2025-08-14 DIAGNOSIS — Z98.890 HISTORY OF LOOP ELECTROSURGICAL EXCISION PROCEDURE (LEEP) OF CERVIX AFFECTING PREGNANCY, ANTEPARTUM: ICD-10-CM

## 2025-08-14 DIAGNOSIS — Z23 NEED FOR TDAP VACCINATION: ICD-10-CM

## 2025-08-14 DIAGNOSIS — Z23 NEED FOR TDAP VACCINATION: Primary | ICD-10-CM

## 2025-08-14 DIAGNOSIS — O99.340 ANXIETY IN PREGNANCY, ANTEPARTUM: ICD-10-CM

## 2025-08-14 DIAGNOSIS — O99.320 HIGH RISK PREGNANCY DUE TO MATERNAL DRUG ABUSE, ANTEPARTUM: ICD-10-CM

## 2025-08-14 DIAGNOSIS — Z3A.30 30 WEEKS GESTATION OF PREGNANCY (HHS-HCC): ICD-10-CM

## 2025-08-14 DIAGNOSIS — O21.9 NAUSEA AND VOMITING IN PREGNANCY: ICD-10-CM

## 2025-08-14 DIAGNOSIS — F19.10 HIGH RISK PREGNANCY DUE TO MATERNAL DRUG ABUSE, ANTEPARTUM: Primary | ICD-10-CM

## 2025-08-14 DIAGNOSIS — F41.9 ANXIETY IN PREGNANCY, ANTEPARTUM: ICD-10-CM

## 2025-08-14 DIAGNOSIS — O99.320 HIGH RISK PREGNANCY DUE TO MATERNAL DRUG ABUSE, ANTEPARTUM: Primary | ICD-10-CM

## 2025-08-14 PROCEDURE — 90715 TDAP VACCINE 7 YRS/> IM: CPT | Performed by: OBSTETRICS & GYNECOLOGY

## 2025-08-14 PROCEDURE — 99212 OFFICE O/P EST SF 10 MIN: CPT | Mod: 25 | Performed by: OBSTETRICS & GYNECOLOGY

## 2025-08-14 PROCEDURE — 99213 OFFICE O/P EST LOW 20 MIN: CPT | Performed by: OBSTETRICS & GYNECOLOGY

## 2025-08-14 ASSESSMENT — LIFESTYLE VARIABLES
HOW MANY STANDARD DRINKS CONTAINING ALCOHOL DO YOU HAVE ON A TYPICAL DAY: PATIENT DOES NOT DRINK
HOW OFTEN DO YOU HAVE SIX OR MORE DRINKS ON ONE OCCASION: NEVER
HAS A RELATIVE, FRIEND, DOCTOR, OR ANOTHER HEALTH PROFESSIONAL EXPRESSED CONCERN ABOUT YOUR DRINKING OR SUGGESTED YOU CUT DOWN: NO
AUDIT-C TOTAL SCORE: 0
HOW OFTEN DO YOU HAVE A DRINK CONTAINING ALCOHOL: NEVER
SKIP TO QUESTIONS 9-10: 1
HAVE YOU OR SOMEONE ELSE BEEN INJURED AS A RESULT OF YOUR DRINKING: NO
AUDIT TOTAL SCORE: 0

## 2025-08-14 ASSESSMENT — PAIN SCALES - GENERAL: PAINLEVEL_OUTOF10: 0 - NO PAIN

## 2025-08-14 ASSESSMENT — ENCOUNTER SYMPTOMS
OCCASIONAL FEELINGS OF UNSTEADINESS: 0
LOSS OF SENSATION IN FEET: 0
DEPRESSION: 0

## 2025-08-14 ASSESSMENT — PAIN - FUNCTIONAL ASSESSMENT: PAIN_FUNCTIONAL_ASSESSMENT: 0-10

## 2025-08-17 LAB
EST. AVERAGE GLUCOSE BLD GHB EST-MCNC: 111 MG/DL
EST. AVERAGE GLUCOSE BLD GHB EST-SCNC: 6.2 MMOL/L
GLUCOSE 1H P CHAL SERPL-MCNC: 207 MG/DL
GLUCOSE 2H P CHAL SERPL-MCNC: 164 MG/DL
GLUCOSE 3H P 100 G GLC PO SERPL-MCNC: 142 MG/DL
GLUCOSE P FAST SERPL-MCNC: 97 MG/DL (ref 65–94)
HBA1C MFR BLD: 5.5 %
SERVICE CMNT-IMP: ABNORMAL

## 2025-08-19 PROBLEM — O24.419 GESTATIONAL DIABETES MELLITUS (GDM), ANTEPARTUM (HHS-HCC): Status: ACTIVE | Noted: 2025-08-19

## 2025-08-20 ENCOUNTER — HOSPITAL ENCOUNTER (OUTPATIENT)
Dept: RADIOLOGY | Facility: CLINIC | Age: 30
Discharge: HOME | End: 2025-08-20
Payer: COMMERCIAL

## 2025-08-20 DIAGNOSIS — Z3A.30 30 WEEKS GESTATION OF PREGNANCY (HHS-HCC): ICD-10-CM

## 2025-08-20 PROCEDURE — 76819 FETAL BIOPHYS PROFIL W/O NST: CPT

## 2025-08-20 PROCEDURE — 76815 OB US LIMITED FETUS(S): CPT

## 2025-08-20 PROCEDURE — 76815 OB US LIMITED FETUS(S): CPT | Performed by: OBSTETRICS & GYNECOLOGY

## 2025-08-20 PROCEDURE — 76819 FETAL BIOPHYS PROFIL W/O NST: CPT | Performed by: OBSTETRICS & GYNECOLOGY

## 2025-08-20 PROCEDURE — 76820 UMBILICAL ARTERY ECHO: CPT | Performed by: OBSTETRICS & GYNECOLOGY

## 2025-08-25 ENCOUNTER — CONSULT (OUTPATIENT)
Dept: MATERNAL FETAL MEDICINE | Facility: HOSPITAL | Age: 30
End: 2025-08-25
Payer: COMMERCIAL

## 2025-08-25 VITALS — SYSTOLIC BLOOD PRESSURE: 133 MMHG | BODY MASS INDEX: 31.64 KG/M2 | WEIGHT: 202 LBS | DIASTOLIC BLOOD PRESSURE: 82 MMHG

## 2025-08-25 DIAGNOSIS — F11.20: ICD-10-CM

## 2025-08-25 DIAGNOSIS — O21.9 NAUSEA AND VOMITING IN PREGNANCY: Primary | ICD-10-CM

## 2025-08-25 DIAGNOSIS — Z36.4 ULTRASOUND FOR ANTENATAL SCREENING FOR FETAL GROWTH RESTRICTION (HHS-HCC): ICD-10-CM

## 2025-08-25 DIAGNOSIS — Z3A.32 32 WEEKS GESTATION OF PREGNANCY (HHS-HCC): ICD-10-CM

## 2025-08-25 DIAGNOSIS — O99.320: ICD-10-CM

## 2025-08-25 DIAGNOSIS — O24.419 GESTATIONAL DIABETES MELLITUS (GDM), ANTEPARTUM, GESTATIONAL DIABETES METHOD OF CONTROL UNSPECIFIED (HHS-HCC): ICD-10-CM

## 2025-08-25 PROCEDURE — 99212 OFFICE O/P EST SF 10 MIN: CPT

## 2025-08-25 PROCEDURE — RXMED WILLOW AMBULATORY MEDICATION CHARGE

## 2025-08-25 PROCEDURE — 99215 OFFICE O/P EST HI 40 MIN: CPT | Performed by: STUDENT IN AN ORGANIZED HEALTH CARE EDUCATION/TRAINING PROGRAM

## 2025-08-25 RX ORDER — PEN NEEDLE, DIABETIC 30 GX3/16"
NEEDLE, DISPOSABLE MISCELLANEOUS
Qty: 200 EACH | Refills: 3 | Status: SHIPPED | OUTPATIENT
Start: 2025-08-25

## 2025-08-25 RX ORDER — ISOPROPYL ALCOHOL 70 ML/100ML
SWAB TOPICAL
Qty: 200 EACH | Refills: 3 | Status: SHIPPED | OUTPATIENT
Start: 2025-08-25

## 2025-08-25 RX ORDER — INSULIN HUMAN 100 [IU]/ML
INJECTION, SUSPENSION SUBCUTANEOUS
Qty: 15 ML | Refills: 3 | Status: SHIPPED | OUTPATIENT
Start: 2025-08-25

## 2025-08-26 ENCOUNTER — TELEPHONE (OUTPATIENT)
Dept: MATERNAL FETAL MEDICINE | Facility: CLINIC | Age: 30
End: 2025-08-26
Payer: COMMERCIAL

## 2025-08-26 ENCOUNTER — ROUTINE PRENATAL (OUTPATIENT)
Dept: OBSTETRICS AND GYNECOLOGY | Facility: CLINIC | Age: 30
End: 2025-08-26
Payer: COMMERCIAL

## 2025-08-26 ASSESSMENT — LIFESTYLE VARIABLES
HOW MANY STANDARD DRINKS CONTAINING ALCOHOL DO YOU HAVE ON A TYPICAL DAY: PATIENT DOES NOT DRINK
HAVE YOU OR SOMEONE ELSE BEEN INJURED AS A RESULT OF YOUR DRINKING: NO
HOW OFTEN DO YOU HAVE A DRINK CONTAINING ALCOHOL: NEVER
HAS A RELATIVE, FRIEND, DOCTOR, OR ANOTHER HEALTH PROFESSIONAL EXPRESSED CONCERN ABOUT YOUR DRINKING OR SUGGESTED YOU CUT DOWN: NO

## 2025-08-26 ASSESSMENT — ENCOUNTER SYMPTOMS
OCCASIONAL FEELINGS OF UNSTEADINESS: 0
DEPRESSION: 0
LOSS OF SENSATION IN FEET: 0

## 2025-08-26 ASSESSMENT — PAIN - FUNCTIONAL ASSESSMENT: PAIN_FUNCTIONAL_ASSESSMENT: 0-10

## 2025-08-26 ASSESSMENT — PAIN SCALES - GENERAL: PAINLEVEL_OUTOF10: 3

## 2025-08-27 ENCOUNTER — HOSPITAL ENCOUNTER (OUTPATIENT)
Dept: RADIOLOGY | Facility: CLINIC | Age: 30
Discharge: HOME | End: 2025-08-27
Payer: COMMERCIAL

## 2025-08-27 ENCOUNTER — PHARMACY VISIT (OUTPATIENT)
Dept: PHARMACY | Facility: CLINIC | Age: 30
End: 2025-08-27
Payer: MEDICAID

## 2025-08-27 DIAGNOSIS — N83.201 RIGHT OVARIAN CYST: ICD-10-CM

## 2025-08-27 DIAGNOSIS — O99.320 HIGH RISK PREGNANCY DUE TO MATERNAL DRUG ABUSE, ANTEPARTUM: ICD-10-CM

## 2025-08-27 DIAGNOSIS — F19.10 HIGH RISK PREGNANCY DUE TO MATERNAL DRUG ABUSE, ANTEPARTUM: ICD-10-CM

## 2025-08-27 DIAGNOSIS — O21.9 NAUSEA AND VOMITING IN PREGNANCY: ICD-10-CM

## 2025-08-27 DIAGNOSIS — O36.5930 MATERNAL CARE FOR OTHER KNOWN OR SUSPECTED POOR FETAL GROWTH, THIRD TRIMESTER, NOT APPLICABLE OR UNSPECIFIED: ICD-10-CM

## 2025-08-27 PROCEDURE — 76815 OB US LIMITED FETUS(S): CPT

## 2025-08-27 PROCEDURE — 76819 FETAL BIOPHYS PROFIL W/O NST: CPT

## 2025-08-27 PROCEDURE — 76820 UMBILICAL ARTERY ECHO: CPT

## 2025-08-28 ENCOUNTER — NUTRITION (OUTPATIENT)
Dept: OBSTETRICS AND GYNECOLOGY | Facility: CLINIC | Age: 30
End: 2025-08-28
Payer: COMMERCIAL

## 2025-08-30 ENCOUNTER — RESULTS FOLLOW-UP (OUTPATIENT)
Dept: OBSTETRICS AND GYNECOLOGY | Facility: CLINIC | Age: 30
End: 2025-08-30
Payer: COMMERCIAL

## 2025-09-03 ENCOUNTER — PATIENT MESSAGE (OUTPATIENT)
Dept: MATERNAL FETAL MEDICINE | Facility: HOSPITAL | Age: 30
End: 2025-09-03

## 2025-09-03 ENCOUNTER — HOSPITAL ENCOUNTER (OUTPATIENT)
Dept: RADIOLOGY | Facility: CLINIC | Age: 30
Discharge: HOME | End: 2025-09-03
Payer: COMMERCIAL

## 2025-09-03 DIAGNOSIS — F19.10 HIGH RISK PREGNANCY DUE TO MATERNAL DRUG ABUSE, ANTEPARTUM: ICD-10-CM

## 2025-09-03 DIAGNOSIS — O21.9 NAUSEA AND VOMITING IN PREGNANCY: ICD-10-CM

## 2025-09-03 DIAGNOSIS — N83.201 RIGHT OVARIAN CYST: ICD-10-CM

## 2025-09-03 DIAGNOSIS — O99.320 HIGH RISK PREGNANCY DUE TO MATERNAL DRUG ABUSE, ANTEPARTUM: ICD-10-CM

## 2025-09-03 PROCEDURE — 76820 UMBILICAL ARTERY ECHO: CPT | Performed by: STUDENT IN AN ORGANIZED HEALTH CARE EDUCATION/TRAINING PROGRAM

## 2025-09-03 PROCEDURE — 76819 FETAL BIOPHYS PROFIL W/O NST: CPT

## 2025-09-03 PROCEDURE — 76816 OB US FOLLOW-UP PER FETUS: CPT | Performed by: STUDENT IN AN ORGANIZED HEALTH CARE EDUCATION/TRAINING PROGRAM

## 2025-09-03 PROCEDURE — 76819 FETAL BIOPHYS PROFIL W/O NST: CPT | Performed by: STUDENT IN AN ORGANIZED HEALTH CARE EDUCATION/TRAINING PROGRAM

## 2025-09-03 PROCEDURE — 76816 OB US FOLLOW-UP PER FETUS: CPT

## 2025-09-03 PROCEDURE — 76820 UMBILICAL ARTERY ECHO: CPT

## 2025-09-05 ENCOUNTER — HOSPITAL ENCOUNTER (OUTPATIENT)
Dept: RADIOLOGY | Facility: CLINIC | Age: 30
Discharge: HOME | End: 2025-09-05
Payer: COMMERCIAL

## 2025-09-05 DIAGNOSIS — N83.201 RIGHT OVARIAN CYST: ICD-10-CM

## 2025-09-05 DIAGNOSIS — O99.320 HIGH RISK PREGNANCY DUE TO MATERNAL DRUG ABUSE, ANTEPARTUM: ICD-10-CM

## 2025-09-05 DIAGNOSIS — O36.5931 IUGR (INTRAUTERINE GROWTH RESTRICTION) AFFECTING CARE OF MOTHER, THIRD TRIMESTER, FETUS 1 (HHS-HCC): ICD-10-CM

## 2025-09-05 DIAGNOSIS — F19.10 HIGH RISK PREGNANCY DUE TO MATERNAL DRUG ABUSE, ANTEPARTUM: ICD-10-CM

## 2025-09-05 DIAGNOSIS — O21.9 NAUSEA AND VOMITING IN PREGNANCY: ICD-10-CM

## 2025-09-05 PROCEDURE — 76815 OB US LIMITED FETUS(S): CPT

## 2025-09-05 PROCEDURE — 76819 FETAL BIOPHYS PROFIL W/O NST: CPT

## 2025-09-05 PROCEDURE — 76820 UMBILICAL ARTERY ECHO: CPT

## 2025-09-22 ENCOUNTER — APPOINTMENT (OUTPATIENT)
Dept: MATERNAL FETAL MEDICINE | Facility: HOSPITAL | Age: 30
End: 2025-09-22
Payer: COMMERCIAL

## 2025-10-09 ENCOUNTER — APPOINTMENT (OUTPATIENT)
Dept: OBSTETRICS AND GYNECOLOGY | Facility: CLINIC | Age: 30
End: 2025-10-09
Payer: COMMERCIAL